# Patient Record
Sex: FEMALE | Race: WHITE
[De-identification: names, ages, dates, MRNs, and addresses within clinical notes are randomized per-mention and may not be internally consistent; named-entity substitution may affect disease eponyms.]

---

## 2023-08-18 ENCOUNTER — HOSPITAL ENCOUNTER
Dept: HOSPITAL 101 - LAB | Age: 53
Discharge: HOME | End: 2023-08-18
Payer: COMMERCIAL

## 2023-08-18 DIAGNOSIS — Z00.00: Primary | ICD-10-CM

## 2023-08-18 LAB
ADD MANUAL DIFF: NO
ALANINE AMINOTRANSFERASE: 25 U/L (ref 14–59)
ALBUMIN GLOBULIN RATIO: 0.9
ALBUMIN LEVEL: 3.7 G/DL (ref 3.4–5)
ALKALINE PHOSPHATASE: 51 U/L (ref 46–116)
ANION GAP: 13
ASPARTATE AMINO TRANSFERASE: 20 U/L (ref 15–37)
BLOOD UREA NITROGEN: 10 MG/DL (ref 7–18)
CALCIUM: 8.4 MG/DL (ref 8.5–10.1)
CARBON DIOXIDE: 25.4 MMOL/L (ref 21–32)
CHLORIDE: 103 MMOL/L (ref 98–107)
CHOLESTEROL: 263 MG/DL (ref ?–200)
CO2 BLD-SCNC: 25.4 MMOL/L (ref 21–32)
ESTIMATED GFR (AFRICAN AMERICA: >60 (ref 60–?)
ESTIMATED GFR (NON-AFRICAN AME: 53 (ref 60–?)
GLOBULIN: 3.9 G/DL
GLUCOSE BLD-MCNC: 103 MG/DL (ref 74–106)
HCT VFR BLD CALC: 38.3 % (ref 36–48)
HDL CHOLESTEROL: 68 MG/DL (ref 40–60)
HEMATOCRIT: 38.3 % (ref 36–48)
HEMOGLOBIN: 12 G/DL (ref 12–16)
IMMATURE GRANULOCYTES ABS AUTO: 0.02 10^3/UL (ref 0–0.03)
IMMATURE GRANULOCYTES PCT AUTO: 0.3 % (ref 0–0.5)
LYMPHOCYTES  ABSOLUTE AUTO: 1.8 10^3/UL (ref 1.2–3.8)
MCV RBC: 80.3 FL (ref 81–99)
MEAN CORPUSCULAR HEMOGLOBIN: 25.2 PG (ref 26.7–34)
MEAN CORPUSCULAR HGB CONC: 31.3 G/DL (ref 29.9–35.2)
MEAN CORPUSCULAR VOLUME: 80.3 FL (ref 81–99)
PLATELET # BLD: 246 10^3/UL (ref 150–450)
PLATELET COUNT: 246 10^3/UL (ref 150–450)
POTASSIUM SERPLBLD-SCNC: 4.4 MMOL/L (ref 3.5–5.1)
POTASSIUM: 4.4 MMOL/L (ref 3.5–5.1)
RED BLOOD COUNT: 4.77 10^6/UL (ref 4.2–5.4)
SODIUM BLD-SCNC: 137 MMOL/L (ref 136–145)
SODIUM: 137 MMOL/L (ref 136–145)
THYROID STIMULATING HORMONE: 1.05 UIU/ML (ref 0.36–3.74)
TOTAL PROTEIN: 7.6 G/DL (ref 6.4–8.2)
TRIGLYCERIDES: 94 MG/DL (ref ?–150)
VLDL CHOLESTEROL: 18.8 MG/DL
WBC # BLD: 6.7 10^3/UL (ref 4–11)
WHITE BLOOD COUNT: 6.7 10^3/UL (ref 4–11)

## 2023-08-18 PROCEDURE — 84443 ASSAY THYROID STIM HORMONE: CPT

## 2023-08-18 PROCEDURE — 80061 LIPID PANEL: CPT

## 2023-08-18 PROCEDURE — 36415 COLL VENOUS BLD VENIPUNCTURE: CPT

## 2023-08-18 PROCEDURE — 83036 HEMOGLOBIN GLYCOSYLATED A1C: CPT

## 2023-08-18 PROCEDURE — 85025 COMPLETE CBC W/AUTO DIFF WBC: CPT

## 2023-08-18 PROCEDURE — 80053 COMPREHEN METABOLIC PANEL: CPT

## 2023-08-23 PROBLEM — R07.9 CHEST PAIN: Status: ACTIVE | Noted: 2023-08-23

## 2023-08-23 PROBLEM — I49.3 PREMATURE VENTRICULAR CONTRACTIONS: Status: ACTIVE | Noted: 2023-08-23

## 2023-08-23 PROBLEM — Z87.891 FORMER SMOKER: Status: ACTIVE | Noted: 2023-08-23

## 2023-08-23 PROBLEM — R06.02 SHORTNESS OF BREATH ON EXERTION: Status: ACTIVE | Noted: 2023-08-23

## 2023-08-23 PROBLEM — E78.5 HYPERLIPIDEMIA: Status: ACTIVE | Noted: 2023-08-23

## 2023-08-23 RX ORDER — ALPRAZOLAM 0.25 MG/1
0.25 TABLET ORAL AS NEEDED
COMMUNITY

## 2023-08-23 RX ORDER — ACETAMINOPHEN AND CODEINE PHOSPHATE 300; 30 MG/1; MG/1
1 TABLET ORAL 3 TIMES DAILY PRN
COMMUNITY

## 2023-08-23 RX ORDER — CYCLOBENZAPRINE HCL 10 MG
10 TABLET ORAL AS NEEDED
COMMUNITY

## 2023-10-03 ENCOUNTER — OFFICE VISIT (OUTPATIENT)
Dept: CARDIOLOGY | Facility: CLINIC | Age: 53
End: 2023-10-03
Payer: COMMERCIAL

## 2023-10-03 VITALS
HEART RATE: 70 BPM | SYSTOLIC BLOOD PRESSURE: 120 MMHG | DIASTOLIC BLOOD PRESSURE: 70 MMHG | HEIGHT: 63 IN | WEIGHT: 133 LBS | BODY MASS INDEX: 23.57 KG/M2

## 2023-10-03 DIAGNOSIS — R06.02 SHORTNESS OF BREATH ON EXERTION: ICD-10-CM

## 2023-10-03 DIAGNOSIS — R07.9 CHEST PAIN, UNSPECIFIED TYPE: Primary | ICD-10-CM

## 2023-10-03 DIAGNOSIS — Z87.891 FORMER SMOKER: ICD-10-CM

## 2023-10-03 DIAGNOSIS — I49.3 PREMATURE VENTRICULAR CONTRACTIONS: ICD-10-CM

## 2023-10-03 DIAGNOSIS — E78.5 HYPERLIPIDEMIA, UNSPECIFIED HYPERLIPIDEMIA TYPE: ICD-10-CM

## 2023-10-03 PROCEDURE — 99213 OFFICE O/P EST LOW 20 MIN: CPT | Performed by: INTERNAL MEDICINE

## 2023-10-03 PROCEDURE — 3008F BODY MASS INDEX DOCD: CPT | Performed by: INTERNAL MEDICINE

## 2023-10-03 PROCEDURE — 1036F TOBACCO NON-USER: CPT | Performed by: INTERNAL MEDICINE

## 2023-10-03 ASSESSMENT — PATIENT HEALTH QUESTIONNAIRE - PHQ9
SUM OF ALL RESPONSES TO PHQ9 QUESTIONS 1 AND 2: 0
2. FEELING DOWN, DEPRESSED OR HOPELESS: NOT AT ALL
1. LITTLE INTEREST OR PLEASURE IN DOING THINGS: NOT AT ALL

## 2023-10-03 NOTE — PROGRESS NOTES
Subjective   Alison Harrison is a 53 y.o. female.    Chief Complaint:  Follow-up (9months)    HPI  Patient is here for follow-up continue management for previous evaluation for atypical chest pain, hyperlipidemia, mild shortness of breath.  Since last time I saw her she reports functional class I.  She denies any complaint of chest pain, palpitation, lightheadedness, dizziness or syncope.  She reports since her.  Has stopped all her chest pain has resolved.  She underwent previous calcium scoring which was all very low risk.  The patient described functional class I and denying any symptoms.  Patient reviewed    ASSESSMENT:      1. Atypical chest pain appears musculoskeletal or pleuritic the patient had a remote history of resection of lung bleb. Recent stress test is reassuring.  Patient report complete resolution of her symptoms with after her period: Calcium scoring with her stopped  2. reformed smoker   3. hyperlipidemia. Does not meet criteria for treatment recent lab work noted and reviewed with her  4. minor shortness breath related to prior tobacco use functional class I with excellent exercise tolerance  5. Documentation of few premature ventricular contractions, felt to be benign. In the past with no recurrence     RECOMMENDATION:      1. Reviewed the results of her  calcium scoring with her  2. I recommended continue with observant approach discussed with her risk factor modification  3. I discussed with her risk factor modification  4. I advised her to notify me with changes of cardiac status or symptoms I will see her back in 1 year       Review of Systems   All other systems reviewed and are negative.      Objective   Constitutional:       Appearance: Healthy appearance. Not in distress.   Neck:      Vascular: No carotid bruit or JVR. JVD normal.   Pulmonary:      Effort: Pulmonary effort is normal.      Breath sounds: Normal breath sounds. No wheezing. No rhonchi. No rales.   Chest:      Chest wall: Not  tender to palpatation.   Cardiovascular:      PMI at left midclavicular line. Normal rate. Regular rhythm. Normal S1. Normal S2.       Murmurs: There is no murmur.      No gallop.  No click. No rub.   Pulses:     Intact distal pulses.   Edema:     Peripheral edema absent.   Abdominal:      General: Bowel sounds are normal.      Palpations: Abdomen is soft.      Tenderness: There is no abdominal tenderness.   Musculoskeletal: Normal range of motion.         General: No tenderness. Skin:     General: Skin is warm and dry.   Neurological:      General: No focal deficit present.      Mental Status: Alert and oriented to person, place and time.         Lab Review:   not applicable    Assessment/Plan   There were no encounter diagnoses.  1. Chest pain, unspecified type        2. Premature ventricular contractions  Follow Up In Cardiology      3. Hyperlipidemia, unspecified hyperlipidemia type        4. Shortness of breath on exertion        5. Former smoker        6. Body mass index (BMI) 22.0-22.9, adult

## 2024-01-11 ENCOUNTER — HOSPITAL ENCOUNTER (EMERGENCY)
Age: 54
LOS: 1 days | Discharge: HOME | End: 2024-01-12
Payer: COMMERCIAL

## 2024-01-11 VITALS
TEMPERATURE: 98.3 F | HEART RATE: 101 BPM | RESPIRATION RATE: 18 BRPM | SYSTOLIC BLOOD PRESSURE: 145 MMHG | OXYGEN SATURATION: 100 % | DIASTOLIC BLOOD PRESSURE: 89 MMHG

## 2024-01-11 VITALS — BODY MASS INDEX: 23.7 KG/M2

## 2024-01-11 DIAGNOSIS — G44.89: Primary | ICD-10-CM

## 2024-01-11 DIAGNOSIS — Z86.16: ICD-10-CM

## 2024-01-11 LAB
ADD MANUAL DIFF: NO
ALANINE AMINOTRANSFERASE: 26 U/L (ref 14–59)
ALBUMIN GLOBULIN RATIO: 0.9
ALBUMIN LEVEL: 3.7 G/DL (ref 3.4–5)
ALKALINE PHOSPHATASE: 59 U/L (ref 46–116)
ANION GAP: 13.5
ASPARTATE AMINO TRANSFERASE: 17 U/L (ref 15–37)
BLOOD UREA NITROGEN: 12 MG/DL (ref 7–18)
CALCIUM: 8.8 MG/DL (ref 8.5–10.1)
CARBON DIOXIDE: 26.2 MMOL/L (ref 21–32)
CHLORIDE: 103 MMOL/L (ref 98–107)
CO2 BLD-SCNC: 26.2 MMOL/L (ref 21–32)
ESTIMATED GFR (AFRICAN AMERICA: >60 (ref 60–?)
ESTIMATED GFR (NON-AFRICAN AME: 52 (ref 60–?)
GLOBULIN: 3.9 G/DL
GLUCOSE BLD-MCNC: 110 MG/DL (ref 74–106)
HCT VFR BLD CALC: 38 % (ref 36–48)
HEMATOCRIT: 38 % (ref 36–48)
HEMOGLOBIN: 12 G/DL (ref 12–16)
IMMATURE GRANULOCYTES ABS AUTO: 0.02 10^3/UL (ref 0–0.03)
IMMATURE GRANULOCYTES PCT AUTO: 0.2 % (ref 0–0.5)
LYMPHOCYTES  ABSOLUTE AUTO: 3.3 10^3/UL (ref 1.2–3.8)
MCV RBC: 85.6 FL (ref 81–99)
MEAN CORPUSCULAR HEMOGLOBIN: 27 PG (ref 26.7–34)
MEAN CORPUSCULAR HGB CONC: 31.6 G/DL (ref 29.9–35.2)
MEAN CORPUSCULAR VOLUME: 85.6 FL (ref 81–99)
PLATELET # BLD: 268 10^3/UL (ref 150–450)
PLATELET COUNT: 268 10^3/UL (ref 150–450)
POTASSIUM SERPLBLD-SCNC: 3.7 MMOL/L (ref 3.5–5.1)
POTASSIUM: 3.7 MMOL/L (ref 3.5–5.1)
RED BLOOD COUNT: 4.44 10^6/UL (ref 4.2–5.4)
SODIUM BLD-SCNC: 139 MMOL/L (ref 136–145)
SODIUM: 139 MMOL/L (ref 136–145)
TOTAL PROTEIN: 7.6 G/DL (ref 6.4–8.2)
WBC # BLD: 8.6 10^3/UL (ref 4–11)
WHITE BLOOD COUNT: 8.6 10^3/UL (ref 4–11)

## 2024-01-11 PROCEDURE — 36415 COLL VENOUS BLD VENIPUNCTURE: CPT

## 2024-01-11 PROCEDURE — 96374 THER/PROPH/DIAG INJ IV PUSH: CPT

## 2024-01-11 PROCEDURE — 96375 TX/PRO/DX INJ NEW DRUG ADDON: CPT

## 2024-01-11 PROCEDURE — 99285 EMERGENCY DEPT VISIT HI MDM: CPT

## 2024-01-11 PROCEDURE — 85025 COMPLETE CBC W/AUTO DIFF WBC: CPT

## 2024-01-11 PROCEDURE — 80053 COMPREHEN METABOLIC PANEL: CPT

## 2024-01-11 PROCEDURE — 85652 RBC SED RATE AUTOMATED: CPT

## 2024-01-11 PROCEDURE — 86140 C-REACTIVE PROTEIN: CPT

## 2024-01-11 PROCEDURE — 70470 CT HEAD/BRAIN W/O & W/DYE: CPT

## 2024-01-11 NOTE — ED.GENADUL1
HPI - General Adult
General
Chief complaint: Headache
Stated complaint: HEADACHE
Time Seen by Provider: 24 22:07
Source: patient
Mode of arrival: walk-in
Limitations: no limitations
History of Present Illness
HPI narrative: 
53-year-old female presents for evaluation of intermittent headaches since before , around 3-4 weeks. The patient states the headaches come and go and are in different places on her head when they come and go. She states they sometimes feel 
better when she squeezes her scalp. They're associated with photophobia and nausea. She has had a prescription for Zofran which has not helped her nausea. She has not had any slurred speech confusion blurred vision, fevers neck pain or stiffness. 
She did have COVID 19 in September. She has had several COVID 19 test since she has been developing headaches without any being positive. She did get a new perception for glasses recently and thought that her headaches may be related to the new 
glasses so she started wearing her prior glasses without any improvement. She has been using ibuprofen and Tylenol. She was seen by her family physician and given medications for migraine headaches. She is frustrated because she does not have a 
history of migraine headaches and the headaches are debilitating at times. She does not have any sore throats, runny nose or sinus congestion. 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 
her headaches.
Related Data
Allergies

Allergy/AdvReac Type Severity Reaction Status Date / Time
No Known Drug Allergies Allergy   Verified 24 22:18



Review of Systems
ROS  
 Status of ROS 10 or more systems reviewed and unremarkable except as noted in history and below   

[facility]
Social History
Smoking status:  Never smoker 



Exam
Narrative
Exam Narrative: 
Nurses note and vital signs reviewed and patient is not hypoxic. She was mildly tachycardic with a pulse of 101 and blood pressure is mildly elevated at 145/89.

General: Alert, nontoxic  female, GCS 15, patient appears uncomfortable but otherwise well, no respiratory distress.  Patient is resting comfortably on cart.
Skin:  Warm, dry, no pallor noted.  There is no rash noted.
Head:  Normocephalic, atraumatic
Eye: Normal conjunctiva, no drainage, EOMI. PERRL, no photophobia
Ears, Nose, Mouth, and Throat: oral mucosa is moist. Nares patent.
neck: supple, no meningeal signs
Cardiovascular:  Regular Rate and Rhythm S1S2, no murmurs, rubs or gallops
Respiratory:  Patient is in no distress, no accessory muscle use, lungs are clear to auscultation, no wheezing, rales or rhonchi
Back:  non-tender, no CVA tenderness bilaterally to percussion.
GI:  Normal bowel sounds, no tenderness to palpation, no masses appreciated.  No rebound, guarding, or rigidity noted.
Musculoskeletal: The patient has no evidence of calf tenderness, no pitting edema, symmetrical pulses noted bilaterally
Neurological:  A&O x4, normal speech, ambulatory with a steady gait, no notable confusion, stroke scale is 0
Psychiatric:  Cooperative
Constitutional
Vital Signs, click to edit/add: 

Last Vital Signs

Temp  98.3 F   24 22:15
Pulse  101 H  24 22:15
Resp  18   24 22:15
BP  145/89 H  24 22:15
Pulse Ox  100   24 22:15
O2 Del Method  Room Air  24 22:15




Course
Vital Signs
Vital signs: 

Vital Signs

Temperature  98.3 F   24 22:15
Pulse Rate  101 H  24 22:15
Respiratory Rate  18   24 22:15
Blood Pressure  145/89 H  24 22:15
Pulse Oximetry  100   24 22:15
Oxygen Delivery Method  Room Air  24 22:15



Temperature  98.3 F   24 22:15
Pulse Rate  101 H  24 22:15
Respiratory Rate  18   24 22:15
Blood Pressure  145/89 H  24 22:15
Pulse Oximetry  100   24 22:15
Oxygen Delivery Method  Room Air  24 22:15




Medical Decision Making
Medical Records
Medical records narrative: 
53-year-old female presents for evaluation of 3 weeks of intermittent moderate to severe headaches. She does not have a history of chronic headaches. The headaches are associated with nausea and photophobia. At the time of her evaluation her 
headache is posterior occipital in natureThey're debilitating at times and she describes them as moving around in different areas of her head. She was recently seen by her family physician who suspects that she may be having migraine headaches 
however she does not have a history of migraine headaches or any particular headache and drums. She does not have a fever. She has no nuchal rigidity. Her neuro exam is normal. Her vital signs were normal with the exception of a pulse of 101. An IV 
was placed and she is medicated with IV Zofran and Toradol with minimal clinical improvement. She was then medicated with Fioricet. Routine labs are reviewed. She has an elevated ESR, normal WBC and hemoglobin. CRP and CMP are normal. CT scan of the 
head with and without contrast was ordered and is included in the body of this report. It is negative for acute findings. 
The results of the labs and CT scan were discussed with the patient and her . She states that she is not surprised that her sedimentation rate is elevated because she does have episodes where her lymph nodes swell up for no apparent reason. 
She declines a course of steroids. Clinically she does not have temporal arteritis as there is no tenderness over her temporal artery and her headaches are in different places at different times in her head. Clinically to me at sounds like she is 
having vascular headaches. She feels comfortable being discharged home at this time will be discharged home with prescription for Fioricet and Compazine. She was given migraine medications by her family physician. She was encouraged to return to 
emergency department for ongoing or worsening headaches, fevers, any neurologic symptoms related to the headaches or any concerns.



The [address] [address]

CT Scan Report 
Signed
Patient: LUZ MARIA SELF


MR#: [id number]
: 1970


Acct:[id number]
Age/Sex: 53 / F


ADM Date: 24
Loc: ER  


Attending Dr: 


Ordering Physician: Monet Santos
Date of Service: 24
Procedure(s): CT head/brain wo/w con
Accession Number(s): [id number]

cc: Leah Yuan M.D.~


 
     The [address] [address] [phone]
 
 
Patient Name:
LUZ MARIA SELF
 
MRN: [id number]    YOB: 1970    Sex: F
Assigned Patient Location: ER
Current Patient Location: ER
Accession/Order Number: [id number]
Exam Date: 2024  22:55    Report Date: 2024  23:27
 
At the request of:
MONET  EDVIN  
 
Procedure:  CT head/brain wo/w con
 
EXAM: CT head/brain wo/w con 
 
HISTORY: headache
 
COMPARISON: None. 
 
TECHNIQUE: Axial CT scans through the head were obtained after the 
administration of intravenous contrast. Dose reduction techniques were 
achieved 
by using: automated exposure control and/or adjustment of mA and /or kV 
according to patient size and/or use of iterative reconstruction technique.
 
 
 
FINDINGS: 
 
There is no acute intracranial hemorrhage or abnormal extra-axial fluid 
collection. No mass effect or midline shift is seen. 
 
There is no evidence of large acute territorial infarction. There is no 
hydrocephalus. 
 
To the limit of CT, the posterior fossa appears unremarkable. 
 
There are no abnormal parenchymal or leptomeningeal enhancement.
 
The calvaria and extra cranial soft tissues are unremarkable. The visualized 
orbits show no abnormality. 
 
The visualized paranasal sinuses show no air-fluid level. Mastoid air cells 
are 
clear.
 
 
 

ORDER #: [id number] CT/CT head/brain wo/w con
IMPRESSION: 
 
No acute intracranial abnormality. 
 
No abnormal intracranial enhancement.
 
 
 
 
Electronically authenticated by: ROSA GARNER   Date: 2024  23:27
Lab Data
Lab results reviewed: Yes I reviewed the patient's lab results
Labs: 

Lab Results

  24 Range/Units
  22:18 22:31 
WBC   8.6  (4.0-11.0)  10^3/uL
RBC   4.44  (4.20-5.40)  10^6/uL
Hgb   12.0  (12.0-16.0)  g/dL
Hct   38.0  (36.0-48.0)  %
MCV   85.6  (81.0-99.0)  fL
MCH   27.0  (26.7-34.0)  pg
MCHC   31.6  (29.9-35.2)  g/dL
RDW   14.6  (11.0-15.0)  %
Plt Count   268  (150-450)  10^3/uL
MPV   10.4  (9.5-13.5)  fL
Neut % (Auto)   47.0  (43.0-75.0)  %
Lymph % (Auto)   38.7  (20.5-60.0)  %
Mono % (Auto)   11.3  (1.7-12.0)  %
Eos % (Auto)   2.3  (0.9-7.0)  %
Baso % (Auto)   0.5  (0.2-2.0)  %
Neut # (Auto)   4.1  (1.4-6.5)  10^3/uL
Lymph # (Auto)   3.3  (1.2-3.8)  10^3/uL
Mono # (Auto)   1.0 H  (0.3-0.8)  10^3/uL
Eos # (Auto)   0.2  (0.0-0.7)  10^3/uL
Baso # (Auto)   0.0  (0.0-0.1)  10^3/uL
Abs Immat Gran (auto)   0.02  (0.00-0.03)  10^3/uL
Imm/Tot Granulo (auto)   0.2  (0.0-0.5)  %
ESR  59 H   (<=30)  mm/hr
Sodium  139   (136-145)  mmol/L
Potassium  3.7   (3.5-5.1)  mmol/L
Chloride  103   ()  mmol/L
Carbon Dioxide  26.2   (21.0-32.0)  mmol/L
Anion Gap  13.5   
BUN  12.0   (7.0-18.0)  mg/dL
Creatinine  1.10 H   (0.55-1.02)  mg/dL
Est GFR ( Amer)  >60   (>=60)  
Est GFR (Non-Af Amer)  52 L   (>=60)  
BUN/Creatinine Ratio  10.9   
Glucose  110 H   ()  mg/dL
Calcium  8.8   (8.5-10.1)  mg/dL
Total Bilirubin  0.3   (0.2-1.0)  mg/dL
AST  17   (15-37)  U/L
ALT  26   (14-59)  U/L
Alkaline Phosphatase  59   ()  U/L
C-Reactive Protein  <0.50   (<=0.50)  mg/dL
Total Protein  7.6   (6.4-8.2)  g/dL
Albumin  3.7   (3.4-5.0)  g/dL
Globulin  3.9   g/dL
Albumin/Globulin Ratio  0.9   




Discharge Plan
Discharge
Chief Complaint: Headache

Clinical Impression:
 Headache syndrome


Patient Disposition: Home, Self-Care

Time of Disposition Decision: 23:53

Condition: Good

Instructions:  Migraine Headache (ED), Acute Headache (ED), General Headache (ED)

Stand Alone Forms:  Portal Instructions

Referrals:
Leah Yuan MD [Primary Care Provider] - 1 week

## 2024-01-11 NOTE — CT_ITS
The [address] [address] 
     [phone] 
  
  
Patient Name: 
LUZ MARIA SELF 
  
MRN: [id number]    YOB: 1970    Sex: F 
Assigned Patient Location: ER 
Current Patient Location: ER 
Accession/Order Number: [id number] 
Exam Date: 1/11/2024  22:55    Report Date: 1/11/2024  23:27 
  
At the request of: 
WHIT  MARKER   
  
Procedure:  CT head/brain wo/w con 
  
EXAM: CT head/brain wo/w con  
  
HISTORY: headache 
  
COMPARISON: None.  
  
TECHNIQUE: Axial CT scans through the head were obtained after the  
administration of intravenous contrast. Dose reduction techniques were  
achieved  
by using: automated exposure control and/or adjustment of mA and /or kV  
according to patient size and/or use of iterative reconstruction technique. 
  
  
  
FINDINGS:  
  
There is no acute intracranial hemorrhage or abnormal extra-axial fluid  
collection. No mass effect or midline shift is seen.  
  
There is no evidence of large acute territorial infarction. There is no  
hydrocephalus.  
  
To the limit of CT, the posterior fossa appears unremarkable.  
  
There are no abnormal parenchymal or leptomeningeal enhancement. 
  
The calvaria and extra cranial soft tissues are unremarkable. The visualized  
orbits show no abnormality.  
  
The visualized paranasal sinuses show no air-fluid level. Mastoid air cells  
are  
clear. 
  
  
  
ORDER #: [id number] CT/CT head/brain wo/w con  
IMPRESSION:   
   
No acute intracranial abnormality.   
   
No abnormal intracranial enhancement.  
   
   
   
   
Electronically authenticated by: ROSA GARNER   Date: 1/11/2024  23:27

## 2024-01-11 NOTE — ED_ITS
HPI - General Adult    
General    
Chief complaint: Headache    
Stated complaint: HEADACHE    
Time Seen by Provider: 24 22:07    
Source: patient    
Mode of arrival: walk-in    
Limitations: no limitations    
History of Present Illness    
HPI narrative:     
53-year-old female presents for evaluation of intermittent headaches since   
before , around 3-4 weeks. The patient states the headaches come and go  
and are in different places on her head when they come and go. She states they   
sometimes feel better when she squeezes her scalp. They're associated with   
photophobia and nausea. She has had a prescription for Zofran which has not   
helped her nausea. She has not had any slurred speech confusion blurred vision,   
fevers neck pain or stiffness. She did have COVID 19 in September. She has had   
several COVID 19 test since she has been developing headaches without any being   
positive. She did get a new perception for glasses recently and thought that her  
headaches may be related to the new glasses so she started wearing her prior gla  
sses without any improvement. She has been using ibuprofen and Tylenol. She was   
seen by her family physician and given medications for migraine headaches. She   
is frustrated because she does not have a history of migraine headaches and the   
headaches are debilitating at times. She does not have any sore throats, runny   
nose or sinus congestion. 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   
uncomfortable at work and generally not herself, due to her headaches.    
Related Data    
                                    Allergies    
    
    
    
Allergy/AdvReac Type Severity Reaction Status Date / Time    
     
No Known Drug Allergies Allergy   Verified 24 22:18    
    
    
    
    
Review of Systems    
    
    
ROS      
    
 Status of ROS                          10 or more systems reviewed and unremark    
able except as noted in     
history and below       
    
    
[facility]    
Social History    
Smoking status:  Never smoker     
    
    
    
Exam    
Narrative    
Exam Narrative:     
Nurses note and vital signs reviewed and patient is not hypoxic. She was mildly   
tachycardic with a pulse of 101 and blood pressure is mildly elevated at 145/89.    
    
General: Alert, nontoxic  female, GCS 15, patient appears uncomfortable  
but otherwise well, no respiratory distress.  Patient is resting comfortably on   
cart.    
Skin:  Warm, dry, no pallor noted.  There is no rash noted.    
Head:  Normocephalic, atraumatic    
Eye: Normal conjunctiva, no drainage, EOMI. PERRL, no photophobia    
Ears, Nose, Mouth, and Throat: oral mucosa is moist. Nares patent.    
neck: supple, no meningeal signs    
Cardiovascular:  Regular Rate and Rhythm S1S2, no murmurs, rubs or gallops    
Respiratory:  Patient is in no distress, no accessory muscle use, lungs are   
clear to auscultation, no wheezing, rales or rhonchi    
Back:  non-tender, no CVA tenderness bilaterally to percussion.    
GI:  Normal bowel sounds, no tenderness to palpation, no masses appreciated.  No  
rebound, guarding, or rigidity noted.    
Musculoskeletal: The patient has no evidence of calf tenderness, no pitting   
edema, symmetrical pulses noted bilaterally    
Neurological:  A&O x4, normal speech, ambulatory with a steady gait, no notable   
confusion, stroke scale is 0    
Psychiatric:  Cooperative    
Constitutional    
Vital Signs, click to edit/add:     
    
                                Last Vital Signs    
    
    
    
Temp  98.3 F   24 22:15    
     
Pulse  101 H  24 22:15    
     
Resp  18   24 22:15    
     
BP  145/89 H  24 22:15    
     
Pulse Ox  100   24 22:15    
     
O2 Del Method  Room Air  24 22:15    
    
    
    
    
    
Course    
Vital Signs    
Vital signs:     
    
                                   Vital Signs    
    
    
    
Temperature  98.3 F   24 22:15    
     
Pulse Rate  101 H  24 22:15    
     
Respiratory Rate  18   24 22:15    
     
Blood Pressure  145/89 H  24 22:15    
     
Pulse Oximetry  100   24 22:15    
     
Oxygen Delivery Method  Room Air  24 22:15    
    
    
                                            
    
    
    
Temperature  98.3 F   24 22:15    
     
Pulse Rate  101 H  24 22:15    
     
Respiratory Rate  18   24 22:15    
     
Blood Pressure  145/89 H  24 22:15    
     
Pulse Oximetry  100   24 22:15    
     
Oxygen Delivery Method  Room Air  24 22:15    
    
    
    
    
    
Medical Decision Making    
Medical Records    
Medical records narrative:     
53-year-old female presents for evaluation of 3 weeks of intermittent moderate   
to severe headaches. She does not have a history of chronic headaches. The   
headaches are associated with nausea and photophobia. At the time of her   
evaluation her headache is posterior occipital in natureThey're debilitating at   
times and she describes them as moving around in different areas of her head.   
She was recently seen by her family physician who suspects that she may be   
having migraine headaches however she does not have a history of migraine   
headaches or any particular headache and drums. She does not have a fever. She   
has no nuchal rigidity. Her neuro exam is normal. Her vital signs were normal   
with the exception of a pulse of 101. An IV was placed and she is medicated with  
IV Zofran and Toradol with minimal clinical improvement. She was then medicated   
with Fioricet. Routine labs are reviewed. She has an elevated ESR, normal WBC   
and hemoglobin. CRP and CMP are normal. CT scan of the head with and without   
contrast was ordered and is included in the body of this report. It is negative   
for acute findings.     
The results of the labs and CT scan were discussed with the patient and her   
. She states that she is not surprised that her sedimentation rate is   
elevated because she does have episodes where her lymph nodes swell up for no   
apparent reason. She declines a course of steroids. Clinically she does not have  
temporal arteritis as there is no tenderness over her temporal artery and her   
headaches are in different places at different times in her head. Clinically to   
me at sounds like she is having vascular headaches. She feels comfortable being   
discharged home at this time will be discharged home with prescription for   
Fioricet and Compazine. She was given migraine medications by her family   
physician. She was encouraged to return to emergency department for ongoing or   
worsening headaches, fevers, any neurologic symptoms related to the headaches or  
any concerns.    
    
    
    
                              [place] [address]    
                                            
                                 CT Scan Report     
                                     Signed    
Patient: LUZ MARIA SELF    
    
    
MR#: [id number]    
: 1970    
    
    
Acct:[id number]    
Age/Sex: 53 / F    
    
    
ADM Date: 24    
Loc: ER      
    
    
Attending Dr:     
    
    
Ordering Physician: Monet Santos    
Date of Service: 24    
Procedure(s): CT head/brain wo/w con    
Accession Number(s): [id number]    
    
cc: Leah Yuan M.D.~    
    
    
     
     The [address] [address] [phone]    
     
     
Patient Name:    
LUZ MARIA SELF    
     
MRN: [id number]    YOB: 1970    Sex: F    
Assigned Patient Location: ER    
Current Patient Location: ER    
Accession/Order Number: [id number]    
Exam Date: 2024  22:55    Report Date: 2024  23:27    
     
At the request of:    
MONET SANTOS      
     
Procedure:  CT head/brain wo/w con    
     
EXAM: CT head/brain wo/w con     
     
HISTORY: headache    
     
COMPARISON: None.     
     
TECHNIQUE: Axial CT scans through the head were obtained after the     
administration of intravenous contrast. Dose reduction techniques were     
achieved     
by using: automated exposure control and/or adjustment of mA and /or kV     
according to patient size and/or use of iterative reconstruction technique.    
     
     
     
FINDINGS:     
     
There is no acute intracranial hemorrhage or abnormal extra-axial fluid     
collection. No mass effect or midline shift is seen.     
     
There is no evidence of large acute territorial infarction. There is no     
hydrocephalus.     
     
To the limit of CT, the posterior fossa appears unremarkable.     
     
There are no abnormal parenchymal or leptomeningeal enhancement.    
     
The calvaria and extra cranial soft tissues are unremarkable. The visualized     
orbits show no abnormality.     
     
The visualized paranasal sinuses show no air-fluid level. Mastoid air cells     
are     
clear.    
     
     
     
    
ORDER #: [id number] CT/CT head/brain wo/w con    
IMPRESSION:     
     
No acute intracranial abnormality.     
     
No abnormal intracranial enhancement.    
     
     
     
     
Electronically authenticated by: ROSA GARNER   Date: 2024  23:27    
Lab Data    
Lab results reviewed: Yes I reviewed the patient's lab results    
Labs:     
    
                                   Lab Results    
    
    
    
  24 Range/Units    
    
  22:18 22:31     
     
WBC   8.6  (4.0-11.0)  10^3/uL    
     
RBC   4.44  (4.20-5.40)  10^6/uL    
     
Hgb   12.0  (12.0-16.0)  g/dL    
     
Hct   38.0  (36.0-48.0)  %    
     
MCV   85.6  (81.0-99.0)  fL    
     
MCH   27.0  (26.7-34.0)  pg    
     
MCHC   31.6  (29.9-35.2)  g/dL    
     
RDW   14.6  (11.0-15.0)  %    
     
Plt Count   268  (150-450)  10^3/uL    
     
MPV   10.4  (9.5-13.5)  fL    
     
Neut % (Auto)   47.0  (43.0-75.0)  %    
     
Lymph % (Auto)   38.7  (20.5-60.0)  %    
     
Mono % (Auto)   11.3  (1.7-12.0)  %    
     
Eos % (Auto)   2.3  (0.9-7.0)  %    
     
Baso % (Auto)   0.5  (0.2-2.0)  %    
     
Neut # (Auto)   4.1  (1.4-6.5)  10^3/uL    
     
Lymph # (Auto)   3.3  (1.2-3.8)  10^3/uL    
     
Mono # (Auto)   1.0 H  (0.3-0.8)  10^3/uL    
     
Eos # (Auto)   0.2  (0.0-0.7)  10^3/uL    
     
Baso # (Auto)   0.0  (0.0-0.1)  10^3/uL    
     
Abs Immat Gran (auto)   0.02  (0.00-0.03)  10^3/uL    
     
Imm/Tot Granulo (auto)   0.2  (0.0-0.5)  %    
     
ESR  59 H   (<=30)  mm/hr    
     
Sodium  139   (136-145)  mmol/L    
     
Potassium  3.7   (3.5-5.1)  mmol/L    
     
Chloride  103   ()  mmol/L    
     
Carbon Dioxide  26.2   (21.0-32.0)  mmol/L    
     
Anion Gap  13.5       
     
BUN  12.0   (7.0-18.0)  mg/dL    
     
Creatinine  1.10 H   (0.55-1.02)  mg/dL    
     
Est GFR ( Amer)  >60   (>=60)      
     
Est GFR (Non-Af Amer)  52 L   (>=60)      
     
BUN/Creatinine Ratio  10.9       
     
Glucose  110 H   ()  mg/dL    
     
Calcium  8.8   (8.5-10.1)  mg/dL    
     
Total Bilirubin  0.3   (0.2-1.0)  mg/dL    
     
AST  17   (15-37)  U/L    
     
ALT  26   (14-59)  U/L    
     
Alkaline Phosphatase  59   ()  U/L    
     
C-Reactive Protein  <0.50   (<=0.50)  mg/dL    
     
Total Protein  7.6   (6.4-8.2)  g/dL    
     
Albumin  3.7   (3.4-5.0)  g/dL    
     
Globulin  3.9   g/dL    
     
Albumin/Globulin Ratio  0.9       
    
    
    
    
    
Discharge Plan    
Discharge    
Chief Complaint: Headache    
    
Clinical Impression:    
 Headache syndrome    
    
    
Patient Disposition: Home, Self-Care    
    
Time of Disposition Decision: 23:53    
    
Condition: Good    
    
Instructions:  Migraine Headache (ED), Acute Headache (ED), General Headache   
(ED)    
    
Stand Alone Forms:  Portal Instructions    
    
Referrals:    
Leah Yuan MD [Primary Care Provider] - 1 week

## 2024-01-11 NOTE — PC.NURSE
Pt with headaches that have been occurring since before [place] time. The headaches are not consistent in location, timing, duration, quality or severity. She has known neck problems and started by seeing her chiropractor for them, her 
chiropractor thought she should see her PCP because he did not think the headaches were from the neck. Here PCP was not concerned about the headaches and only gave her a sample of a migraine medication. The headaches have continued to get worse, 
patient has tried to take several OTC and prescription medications including muscle relaxers, Anti inflammatories, Benadryl, Zofran. There has been improvement enough that she is able to get sleep, but not full resolution. Patient is hoping to have 
imaging done today.

## 2024-10-03 ENCOUNTER — APPOINTMENT (OUTPATIENT)
Dept: CARDIOLOGY | Facility: CLINIC | Age: 54
End: 2024-10-03
Payer: COMMERCIAL

## 2024-10-09 ENCOUNTER — APPOINTMENT (OUTPATIENT)
Dept: CARDIOLOGY | Facility: CLINIC | Age: 54
End: 2024-10-09
Payer: COMMERCIAL

## 2024-10-09 VITALS
WEIGHT: 127.6 LBS | SYSTOLIC BLOOD PRESSURE: 131 MMHG | HEIGHT: 63 IN | BODY MASS INDEX: 22.61 KG/M2 | HEART RATE: 84 BPM | DIASTOLIC BLOOD PRESSURE: 82 MMHG

## 2024-10-09 DIAGNOSIS — R07.9 CHEST PAIN, UNSPECIFIED TYPE: Primary | ICD-10-CM

## 2024-10-09 DIAGNOSIS — R06.02 SHORTNESS OF BREATH ON EXERTION: ICD-10-CM

## 2024-10-09 DIAGNOSIS — Z87.891 FORMER SMOKER: ICD-10-CM

## 2024-10-09 DIAGNOSIS — I49.3 PREMATURE VENTRICULAR CONTRACTIONS: ICD-10-CM

## 2024-10-09 DIAGNOSIS — E78.5 HYPERLIPIDEMIA, UNSPECIFIED HYPERLIPIDEMIA TYPE: ICD-10-CM

## 2024-10-09 PROCEDURE — 99213 OFFICE O/P EST LOW 20 MIN: CPT | Performed by: INTERNAL MEDICINE

## 2024-10-09 PROCEDURE — 1036F TOBACCO NON-USER: CPT | Performed by: INTERNAL MEDICINE

## 2024-10-09 PROCEDURE — 3008F BODY MASS INDEX DOCD: CPT | Performed by: INTERNAL MEDICINE

## 2024-10-09 RX ORDER — OMEPRAZOLE 20 MG/1
20 CAPSULE, DELAYED RELEASE ORAL
COMMUNITY

## 2024-10-09 RX ORDER — FAMOTIDINE 20 MG/1
20 TABLET, FILM COATED ORAL NIGHTLY
COMMUNITY

## 2024-10-09 NOTE — LETTER
October 9, 2024     Aleah Bassett MD  [address]  [address]  [place] [facility] [address]    Patient: Alison Harrison   YOB: 1970   Date of Visit: 10/9/2024       Dear Dr. Aleah Bassett MD:    Thank you for referring Alison Harrison to me for evaluation. Below are my notes for this consultation.  If you have questions, please do not hesitate to call me. I look forward to following your patient along with you.       Sincerely,     Shai Saenz MD      CC: No Recipients  ______________________________________________________________________________________    Subjective   Alison Harrison is a 54 y.o. female       Chief Complaint    Annual Exam          HPI     Patient is here for follow-up continue management for previous evaluation for atypical chest pain and hyperlipidemia.  Since last time I saw her she admit to very high level of anxiety.  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  The patient denies complaint of chest pain recently denies lightheadedness, dizziness or syncope.  She remains active.    ASSESSMENT:      1. Atypical chest pain appears musculoskeletal or pleuritic the patient had a remote history of resection of lung bleb. Recent stress test is reassuring.  Patient report complete resolution of her symptoms with after her period: Calcium scoring with her stopped  2. reformed smoker   3. hyperlipidemia. Does not meet criteria for treatment recent lab work noted and reviewed with her  4. minor shortness breath related to prior tobacco use functional class I with excellent exercise tolerance  5. Documentation of few premature ventricular contractions, felt to be benign. In the past with no recurrence     RECOMMENDATION:      1. Reviewed the results of her previous cardiac workup including stress test and calcium scoring  2. I recommended continue with observant approach discussed with her risk factor modification  3. I discussed with her risk  "factor modification  4. I advised her to notify me with changes of cardiac status or symptoms I will see her back in 1 year  Review of Systems   All other systems reviewed and are negative.           Vitals:    10/09/24 1522 10/09/24 1552   BP: 146/86 131/82   BP Location: Left arm    Patient Position: Sitting    Pulse: 84    Weight: 57.9 kg (127 lb 9.6 oz)    Height: 1.6 m (5' 3\")         Objective   Physical Exam  Constitutional:       Appearance: Normal appearance.   HENT:      Nose: Nose normal.   Neck:      Vascular: No carotid bruit.   Cardiovascular:      Rate and Rhythm: Normal rate.      Pulses: Normal pulses.      Heart sounds: Normal heart sounds.   Pulmonary:      Effort: Pulmonary effort is normal.   Abdominal:      General: Bowel sounds are normal.      Palpations: Abdomen is soft.   Musculoskeletal:         General: Normal range of motion.      Cervical back: Normal range of motion.      Right lower leg: No edema.      Left lower leg: No edema.   Skin:     General: Skin is warm and dry.   Neurological:      General: No focal deficit present.      Mental Status: She is alert.   Psychiatric:         Mood and Affect: Mood normal.         Behavior: Behavior normal.         Thought Content: Thought content normal.         Judgment: Judgment normal.         Allergies  Patient has no known allergies.     Current Medications    Current Outpatient Medications:   •  acetaminophen-codeine (Tylenol w/ Codeine #3) 300-30 mg tablet, Take 1 tablet by mouth 3 times a day as needed for severe pain (7 - 10)., Disp: , Rfl:   •  ALPRAZolam (Xanax) 0.25 mg tablet, Take 1 tablet (0.25 mg) by mouth if needed for anxiety., Disp: , Rfl:   •  cyclobenzaprine (Flexeril) 10 mg tablet, Take 1 tablet (10 mg) by mouth if needed., Disp: , Rfl:   •  famotidine (Pepcid) 20 mg tablet, Take 1 tablet (20 mg) by mouth once daily at bedtime., Disp: , Rfl:   •  omeprazole (PriLOSEC) 20 mg DR capsule, Take 1 capsule (20 mg) by mouth once " daily in the morning. Take before meals. Do not crush or chew., Disp: , Rfl:                      Assessment/Plan   1. Chest pain, unspecified type        2. Premature ventricular contractions  Follow Up In Cardiology    Follow Up In Cardiology      3. Shortness of breath on exertion        4. Hyperlipidemia, unspecified hyperlipidemia type        5. Former smoker        6. Body mass index (BMI) 22.0-22.9, adult                 Scribe Attestation  By signing my name below, Jing CASTILLO LPN, Scribe   attest that this documentation has been prepared under the direction and in the presence of Shai Saenz MD.     Provider Attestation - Scribe documentation    All medical record entries made by the Scribe were at my direction and personally dictated by me. I have reviewed the chart and agree that the record accurately reflects my personal performance of the history, physical exam, discussion and plan.

## 2024-10-09 NOTE — PROGRESS NOTES
"Subjective   Alison Harrison is a 54 y.o. female       Chief Complaint    Annual Exam          HPI     Patient is here for follow-up continue management for previous evaluation for atypical chest pain and hyperlipidemia.  Since last time I saw her she admit to very high level of anxiety.  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  The patient denies complaint of chest pain recently denies lightheadedness, dizziness or syncope.  She remains active.    ASSESSMENT:      1. Atypical chest pain appears musculoskeletal or pleuritic the patient had a remote history of resection of lung bleb. Recent stress test is reassuring.  Patient report complete resolution of her symptoms with after her period: Calcium scoring with her stopped  2. reformed smoker   3. hyperlipidemia. Does not meet criteria for treatment recent lab work noted and reviewed with her  4. minor shortness breath related to prior tobacco use functional class I with excellent exercise tolerance  5. Documentation of few premature ventricular contractions, felt to be benign. In the past with no recurrence     RECOMMENDATION:      1. Reviewed the results of her previous cardiac workup including stress test and calcium scoring  2. I recommended continue with observant approach discussed with her risk factor modification  3. I discussed with her risk factor modification  4. I advised her to notify me with changes of cardiac status or symptoms I will see her back in 1 year  Review of Systems   All other systems reviewed and are negative.           Vitals:    10/09/24 1522 10/09/24 1552   BP: 146/86 131/82   BP Location: Left arm    Patient Position: Sitting    Pulse: 84    Weight: 57.9 kg (127 lb 9.6 oz)    Height: 1.6 m (5' 3\")         Objective   Physical Exam  Constitutional:       Appearance: Normal appearance.   HENT:      Nose: Nose normal.   Neck:      Vascular: No carotid bruit.   Cardiovascular:      Rate and " Rhythm: Normal rate.      Pulses: Normal pulses.      Heart sounds: Normal heart sounds.   Pulmonary:      Effort: Pulmonary effort is normal.   Abdominal:      General: Bowel sounds are normal.      Palpations: Abdomen is soft.   Musculoskeletal:         General: Normal range of motion.      Cervical back: Normal range of motion.      Right lower leg: No edema.      Left lower leg: No edema.   Skin:     General: Skin is warm and dry.   Neurological:      General: No focal deficit present.      Mental Status: She is alert.   Psychiatric:         Mood and Affect: Mood normal.         Behavior: Behavior normal.         Thought Content: Thought content normal.         Judgment: Judgment normal.         Allergies  Patient has no known allergies.     Current Medications    Current Outpatient Medications:     acetaminophen-codeine (Tylenol w/ Codeine #3) 300-30 mg tablet, Take 1 tablet by mouth 3 times a day as needed for severe pain (7 - 10)., Disp: , Rfl:     ALPRAZolam (Xanax) 0.25 mg tablet, Take 1 tablet (0.25 mg) by mouth if needed for anxiety., Disp: , Rfl:     cyclobenzaprine (Flexeril) 10 mg tablet, Take 1 tablet (10 mg) by mouth if needed., Disp: , Rfl:     famotidine (Pepcid) 20 mg tablet, Take 1 tablet (20 mg) by mouth once daily at bedtime., Disp: , Rfl:     omeprazole (PriLOSEC) 20 mg DR capsule, Take 1 capsule (20 mg) by mouth once daily in the morning. Take before meals. Do not crush or chew., Disp: , Rfl:                      Assessment/Plan   1. Chest pain, unspecified type        2. Premature ventricular contractions  Follow Up In Cardiology    Follow Up In Cardiology      3. Shortness of breath on exertion        4. Hyperlipidemia, unspecified hyperlipidemia type        5. Former smoker        6. Body mass index (BMI) 22.0-22.9, adult                 Scribe Attestation  By signing my name below, Jing CASTILLO LPN   , Scribe   attest that this documentation has been prepared under the direction and in the  presence of Shai Saenz MD.     Provider Attestation - Scribe documentation    All medical record entries made by the Scribe were at my direction and personally dictated by me. I have reviewed the chart and agree that the record accurately reflects my personal performance of the history, physical exam, discussion and plan.

## 2024-10-09 NOTE — PATIENT INSTRUCTIONS
Please bring all medicines, vitamins, and herbal supplements with you when you come to the office.    Prescriptions will not be filled unless you are compliant with your follow up appointments or have a follow up appointment scheduled as per instruction of your physician. Refills should be requested at the time of your visit.     One year  Same meds

## 2025-03-06 ENCOUNTER — TELEPHONE (OUTPATIENT)
Dept: CARDIOLOGY | Facility: CLINIC | Age: 55
End: 2025-03-06
Payer: COMMERCIAL

## 2025-03-06 NOTE — TELEPHONE ENCOUNTER
Patient left voicemail requesting an appointment with Dr. Shai Saenz MD. I called patient back and she stated she has been having chest pain for one month. She stated she is not currently having it, but it is intermittent over the month. She stated it goes to her left shoulder and arm. She also stated her arm will occasionally be weak or painful. She stated she also gets nausea. I asked patient if she has been to the ER and that we would recommend she goes. She has not went and prefers to just see Dr. Shai Saenz MD in office.   I advised her I will schedule her for Dr. Shai Saenz MD first available appointment (next week 3/13 @ 3:20). I also sent her to the nurse line to discuss with nursing.

## 2025-03-06 NOTE — TELEPHONE ENCOUNTER
Patient contacted. Patient states chest discomfort radiating to neck, shoulder and heaviness in left arm, can occur with rest or activity or after eating. Duration-30 min or less. Frequency-3 times weekly but recently daily. Onset-one month  Patient started baby asa.  To go to er for unresolved symptom. No symptoms at present    To Dr. Shai Saenz MD      As certified below, I, or a nurse practitioner or physician assistant working with me, had a face-to-face encounter that meets the physician face-to-face encounter requirements.

## 2025-03-10 ENCOUNTER — HOSPITAL ENCOUNTER
Dept: HOSPITAL 101 - PM | Age: 55
Discharge: HOME | End: 2025-03-10
Payer: COMMERCIAL

## 2025-03-10 DIAGNOSIS — M48.062: Primary | ICD-10-CM

## 2025-03-10 PROCEDURE — G0463 HOSPITAL OUTPT CLINIC VISIT: HCPCS

## 2025-03-10 NOTE — P.CN_ITS
Consult Note: HPI    
Data of Consult    
Patient: new to practice    
Consult date: 03/10/25    
Requesting Physician:     
Jyoti Chris MD    
    
Primary Care Provider:     
Leah Yuan MD    
    
Consult Narrative    
Reason for consult: low back, bilateral leg pain    
Narrative:     
[id number] who presents for evaluation. longstanding low back, bilateral lower   
extremity pain. has engaged in a series of provider directed home exercises >6   
weeks, without lasting benefit. lumbar xr with extensive degenerative changes.   
uses otc pain meds as needed.    
cc::     
CC: Jyoti Chris MD    
    
    
Review of Systems    
    
    
ROS      
    
 Status of ROS                          10 or more systems reviewed and unremark    
able except as noted in     
history and below       
    
    
PFSH    
PFSH    
Social History (Reviewed 03/10/25 @ 14:08 by Jyoti Chris MD)    
Smoking status:  Never smoker     
    
    
    
Meds    
Home Medications and Allergies    
                                    Allergies    
    
    
    
Allergy/AdvReac Type Severity Reaction Status Date / Time    
     
No Known Drug Allergies Allergy   Verified 01/11/24 22:18    
    
    
    
    
Exam    
Narrative    
Exam Narrative:     
Psych-alert and oriented x 3. Attentive and appropriate, constitutionally   
normal, displays normal mood and affect per situation. There are no obvious   
deficits in memory, reasoning, or intellect.?    
Skin-no obvious rashes, bruising, erythema noted to the patient's area of pain.?    
Extremities- extremities are warm with minimal edema and palpable pulses.    
Lumbar-tenderness to palpation noted in the lumbar spine and paraspinal   
musculature. Pain is elicited with flexion, extension, and lateral rotation of   
the lumbar spine. Range of motion is diminished with these motions. Facet   
loading maneuvers are positive.    
Strength-noted to be unremarkable    
Sensory-no notable sensory deficits in the bilateral lower extremities to touch   
or pinprick in all dermatomal distributions with the exception to decreased   
sensation to the bilateral L4, 5 dermatomal distribution    
Coordination remains intact.?    
Gait remains non-antalgic.    
    
Assessment and Plan    
Assessment and Plan    
(1) Lumbar stenosis with neurogenic claudication:     
    
Plan    
54yof who presents for evaluation. failed conservative measures, as noted.   
imaging reviewed, as noted. given symptoms and imaging, prudent to obtain lumbar  
mri without contrast for further info. she is in agreement. meds reviewed, no   
changes. follow up after imaging.

## 2025-03-10 NOTE — XMS_ITS
Comprehensive CCD (C-CDA v2.1)  
  
                           Created on: March 10, 2025  
  
  
Kathi Self  
External Reference #: CDR,PersonID:[id number]  
: 1970  
Sex: Female  
  
Demographics  
  
  
                                        Address             [place]  [address]  
   
                                        Mobile Phone        [phone]  
   
                                        Preferred Language  en  
   
                                        Marital Status        
   
                                        [employer] Affiliation Unknown  
   
                                        Race                White  
   
                                        Ethnic Group        Not  or Lati  
no  
  
  
Author  
  
  
                                        Organization        [facility]  
  
  
Care Team Providers  
  
  
                                Care Team Member Name Role            Phone  
   
                                ROQUE SPEARS Unavailable     Unavailable  
   
                                BRITTNEY MEZA   Unavailable     Unavailable  
   
                                MABEL, RADHA Admitting       Unavailable  
   
                                MABEL, RADHA Attending       Unavailable  
   
                                REQUEST,  NONE LISTED Primary Care    Unavaila  
ble  
   
                                MABEL, RADHA Consulting      Unavailable  
   
                                MABEL, RADHA Admitting       Unavailable  
   
                                MABEL, RADHA Attending       Unavailable  
   
                                REQUEST,  NONE LISTED Primary Care    Unavaila  
ble  
   
                                MABEL, RADHA Consulting      Unavailable  
   
                                ROSS, MIKAELA EDWIN Admitting       Unavailable  
   
                                ROSS, MIKAELA EDWIN Attending       Unavailable  
   
                                REQUEST,  NONE LISTED Primary Care    Unavaila  
ble  
   
                                ROSS, MIKAELA EDWIN Consulting      Unavailable  
   
                                Brittney Meza Unavailable     [phone]  
   
                                Unavailable     Unavailable     [phone]  
   
                                MD Brittney Meza Primary Care Provider [phone]   
   
                                MD Roque Spears Attending Provider [phone]  
   
                                Tory, Dr. Morales Referring       Unavaila  
ble  
   
                                Tory, Dr. Morales Attending       Unavaila  
arturo Meza, Dr. Brittney Finley Primary Care    Unav  
ailclover Meza, Dr. Brittney Finley Primary Care    Unav  
ailable  
   
                                Tory, Dr. Morales Referring       Unavaila  
ble  
   
                                Tory, Dr. Morales Attending       Unavaila  
ble  
   
                                Kwabena, Dr. Brittney Finley Primary Care    Unav  
ailable  
   
                                Tory, Dr. Morales Attending       Unavaila  
ble  
   
                                Tory, Dr. Morales Attending       Unavaila  
ble  
   
                                Kwabena, Dr. Brittney Finley Primary Care    Unav  
ailable  
   
                                Fiona Bland Unavailable     [phone]  
[phone]  
   
                                Brittney Meza   Unavailable     [phone]  
   
                                Drew Mejia  Unavailable     [phone]  
   
                                Meza, MD Brittney E Primary Care Provider [phone]  
  
   
                                MD Karlie Kuo Emergency Provider [phone]  
   
                                MD Brittney Meza Attending Provider [phone]  
   
                                Brittney Meza MD Primary Care Provider [phone]   
   
                                Brittney Meza MD Primary Care Provider [phone]  
   
                                Visci DO, Bree A Unavailable     [phone]  
   
                                MD Brittney Meza Primary Care Provider [phone]  
  
   
                                MD Brittney Meza Attending Provider [phone]  
   
                                STEFANIA Self Attending Provider [phone]  
[phone]  
   
                                Brittney Meza MD Primary Care Provider [phone]   
   
                                Brittney Meza MD Attending Provider [phone]  
   
                                Danilo AGUIAR, Kathi OLIVERA Attending Provider [phone]  
[phone]  
   
                                Karlie Kuo Admitting       Unavailable  
   
                                Karlie Kuo Attending       Unavailable  
   
                                Meza, Brittney E Primary Care    Unavailable  
   
                                Meza, Brittney E Primary Care    Unavailable  
   
                                Meza, Brittney E Attending       Unavailable  
   
                                Meza, Brittney E Admitting       Unavailable  
   
                                Meza, Brittney E Admitting       Unavailable  
   
                                Meza, Brittney E Primary Care    Unavailable  
   
                                Meza, Brittney E Attending       Unavailable  
   
                                Meza, Brittney E Primary Care    Unavailable  
   
                                Meza, Brittney E Attending       Unavailable  
   
                                Meza, Brittney E Admitting       Unavailable  
   
                                Meza, Brittney E Primary Care    Unavailable  
   
                                Meza, Brittney E Attending       Unavailable  
   
                                Meza, Brittney E Admitting       Unavailable  
   
                                Meza, Brittney E Primary Care    Unavailable  
   
                                Kathi Self L    Admitting       Unavailable  
   
                                [place] fer[place] L    Attending       Unavailable  
   
                                Meza, Brittney E Primary Care    Unavailable  
   
                                Meza, Brittney E Attending       Unavailable  
   
                                Meza, Brittney E Admitting       Unavailable  
   
                                VISCI, BREE A Attending       Unavailable  
   
                                DANILO, KATHI      Attending       Unavailable  
   
                                DANILO, KATHI      Attending       Unavailable  
   
                                VISCI, BREE A Referring       Unavailable  
   
                                VISCI, BREE A Attending       Unavailable  
   
                                TRABALESSIA, SALOMEF Attending       Unavailable  
   
                                TRABOULMAHESH, MOURHAF Referring       Unavailable  
   
                                MEZA, BRITTNEY E Primary Care    Unavailable  
  
  
  
Allergies  
  
  
                                                    Allergy   
Classification                          Reported   
Allergen(s)               Allergy Type              Date of   
Onset                     Reaction(s)               Facility  
   
                                                      
(19 sources)                            Acetaminophen;   
Translations:   
[acetaminophen] Drug Allergy    2024      [facility]  
   
                                                      
(19 sources)                            Propoxyphene;   
Translations:   
[propoxyphene]  Drug Allergy    2024      [facility]  
  
  
  
Medications  
Current Medications  
  
  
  
                      Medication Drug Class(es) Dates      Sig (Normalized) Sig   
(Original)  
   
                                                    clobetasol   
propionate 0.5 mg/ml   
topical cream  
(1 source)                Corticosteroid            Start:   
2024  
End:   
10-                                          clobetasol (Temovate)   
0.05 % cream   
Indications: Vaginal   
pain Apply 1   
application topically   
in the morning and 1   
application before   
bedtime. Do all this   
for 7 days. Apply to   
affected area twice a   
day. 45 g 2024   
10/01/2024 Active  
   
                                                    estradiol 1 mg /   
norethindrone   
acetate 0.5 mg oral   
tablet  
(20 sources)              Estrogen                  Start:   
2025  
End:   
2026                                          estradiol-norethindro  
ne (ActivElla) 1-0.5   
MG tablet   
Indications:   
Postmenopausal HRT   
(hormone replacement   
therapy) Take 1   
tablet by mouth Daily   
30 tablet 2025   
Active  
  
  
  
                                Start: 2024                 Estradiol-Nore  
thindrone Acet 1-0.5 mg   
tablet Active 1 TAB PO Daily May 7th, 2024   
11:00pm FreeTextSi tablet Orally Once   
a day; Note: Source Status: Taking;   
Provider: Kwabena Lynn (NPI: [id number])  
   
                                        Start: 2024   take 1 tablet by roma  
th   
once daily                              Estradiol-Norethindrone Acet Active 1 TA  
B   
PO Daily May 8th, 2024 12:00am   
FreeTextSi tablet Orally Once a day;   
Note: Source Status: Taking; Provider:   
Kwabena Lynn (NPI: [id number])  
   
                                                    Start: 2024  
End: 2025                                     estradiol-norethindrone (Act  
ivElla) 1-0.5   
MG tablet Indications: Postmenopausal HRT   
(hormone replacement therapy) Take 1   
tablet by mouth in the morning. 30 tablet   
2024 Discontinued   
(Reorder)  
   
                                                            take 1 tablet by roma  
th   
every twenty-four hours                 Mimvey 1-0.5 MG 1 tablet Orally Once a d  
ay   
Active  
   
                                                            take 1 tablet by roma  
th   
every twenty-four hours                   
   
                                                            take 1 tablet by roma  
th   
every twenty-four hours                   
  
  
  
                                                    famotidine 40 mg   
oral tablet  
(20 sources)                            Histamine-2   
Receptor Antagonist       Start: 10-         take 1 tablet   
by mouth at   
bedtime                                 famotidine   
(Pepcid) 40 MG   
tablet Take 40 mg   
by mouth at   
bedtime   
10/12/2024 Active  
  
  
  
                                        Start: 2024   take 1 tablet by roma  
th once   
daily at bedtime                        Famotidine (Pepcid Ac) 20 mg tablet   
Active 20 MG PO Daily May 7th, 2024   
11:00pm FreeTextSi tablet every   
HS; Note: Source Status: Taking;   
Provider: Kwabena Lynn (NPI:   
[id number])  
  
  
  
                                                    fluconazole 150 mg oral   
tablet  
(14 sources)    Azole Antifungal Start: 2025                 fluconazole (  
Diflucan)   
150 MG tablet   
Indications:   
Vulvovaginal Candidiasis   
1 tab now-repeat in 4   
days then take 1 pill   
weekly for 8 weeks 10   
tablet 2025 Active  
  
  
  
                                                    Start: 2024  
End: 2024                                     fluconazole (Diflucan) 150 M  
G tablet   
Indications: Vaginal yeast infection   
Take 1 tablet (150 mg) by mouth   
every 3rd (third) day if needed   
(take one tablet now and repeat in   
48-72 hours as needed) for up to 3   
days Repeat in 7 days if symptoms   
persist. 2 tablet 2024 Active  
   
                                        Start: 2023   take 1 tablet by roma  
th every   
twenty-four hours                         
  
  
  
                                                    ibuprofen 800 mg   
oral tablet  
(14 sources)                            Nonsteroidal   
Anti-inflammatory Drug                  Start:   
2024                              take 1 tablet   
by mouth every   
eight hours as   
needed for   
pain                                    Ibuprofen 800 mg   
tablet Active 800   
MG PO Q8H as   
needed for pain 30   
   
11:00pm  
   
                                                    lidocaine 0.05   
mg/mg medicated   
patch  
(14 sources)                            Antiarrhythmic, Amide   
Local Anesthetic                        Start:   
2024                              apply 1 dose   
topically once   
daily                                   Lidocaine   
(Lidoderm) 5 %   
adhesive   
patch,medicated   
Active 1 PATCH   
TOPICAL Daily 15   
   
11:00pm leave on   
most painful area   
for up to 12 hrs  
   
                                                    mebendazole 100   
mg chewable   
tablet  
(2 sources)               Antihelminthic            Start:   
10-  
End: 2024                                     mebendazole   
(Vermox) 100 MG   
chewable tablet   
Indications:   
Diarrhea,   
unspecified type   
Chew 1 tablet (100   
mg) in the morning   
and 1 tablet (100   
mg) before   
bedtime. Do all   
this for 3 days. 6   
tablet 1   
10/31/2024   
2024 Active  
   
                                                    [place] (No   
Known Home Meds)  
(1 source)                                          Start:   
2020                                          [place] (No Known   
Home Meds) Active   
2020   
12:00am  
   
                                                    nystatin 100   
unt/mg topical   
ointment  
(20 sources)              Polyene Antifungal        Start:   
2024  
End: 2025                                     nystatin   
(Mycostatin)   
ointment   
Indications:   
Vulvar itching   
Apply topically 2   
(two) times a day   
Thin amount in   
combination with   
Triamcinolone   
equal parts 30 g   
2024 Active  
  
  
  
                                                    Start: 2024  
End: 2024           take 4 mL by mouth four times daily   
   
                                Start: 2023 take 4 mL by mouth four times   
daily   
   
                                Start: 2023 take 4 mL by mouth four times   
daily   
  
  
  
                                                    omeprazole 40 mg   
delayed release   
oral capsule  
(20 sources)                            Proton Pump   
Inhibitor                 Start: 2025         take 1 capsule   
by mouth once   
daily                                   Omeprazole 40 mg   
capsule,delayed   
release(DR/EC) Active   
40 MG PO Daily    
12:00am  
  
  
  
                                Start: 2024                 OMEPRAZOLE PO   
2024 Active  
   
                                                    Start: 2024  
End: 2025                         take 1 tablet by mouth once   
daily in the morning                    Omeprazole 20 mg tablet,delayed   
release (DR/EC) Discontinued MG PO   
May 7th, 2024 11:00pm 2025 8:49am FreeTextSi   
tablet every am; Note: Source   
Status: Taking; Provider: Kwabena Lynn (NPI: [id number])  
   
                                                      
End: 2023                         take 1 capsule by mouth once   
daily before mealtime                   omeprazole (PriLOSEC) 20 mg DR   
capsule Take 1 capsule (20 mg) by   
mouth once daily in the morning.   
Take before meals. Do not crush or   
chew. Active  
   
                                                                Omeprazole 20 MG  
 1 tablet every am   
Active  
  
  
  
                                                    ondansetron 4 mg   
disintegrating oral   
tablet  
(11 sources)                            Serotonin-3   
Receptor   
Antagonist                              Start:   
10-  
End:   
2025                              take 1   
tablet by   
mouth   
every   
eight   
hours                                   Ondansetron 4 mg   
tablet,disintegrating   
Active 4 MG PO Q8H    
8:48am  
   
                                                    sulfamethoxazole 800   
mg / trimethoprim   
160 mg oral tablet  
(14 sources)                            Dihydrofolate   
Reductase   
Inhibitor   
Antibacterial,   
Sulfonamide   
Antimicrobial                           Start:   
10-                                          sulfamethoxazole-trimeth  
oprim (Bactrim DS)   
800-160 MG per tablet 1   
tablet 10/14/2024 Active  
  
  
  
                                                    Start: 10-  
End: 2024                         take 1 tablet by mouth   
twice daily                             Sulfamethoxazole-Trimethoprim 800-160 mg  
   
tablet Discontinued 1 TAB PO Twice daily  11:00pm 2024   
11:40am  
   
                                        Start: 12-   take 1 tablet by roma  
th   
every twelve hours                      Bactrim -160 MG 1 tablet Orally Tw  
ice a   
day for 5 days 15 Dec, 2023 Active  
  
  
  
                                                    triamcinolone acetonide   
0.001 mg/mg topical   
ointment  
(11 sources)    Corticosteroid  Start: 2024                 triamcinolone   
(Kenalog)   
0.1 % ointment   
Indications: Vulvar   
itching Apply topically 2   
(two) times a day 30 g   
2024 Active  
  
  
  
Completed/Discontinued Medications  
  
  
  
                      Medication Drug Class(es) Dates      Sig (Normalized) Sig   
(Original)  
   
                                                    acetaminophen 300 mg /   
codeine phosphate 30   
mg oral tablet  
(20 sources)              Opioid Agonist            Start:   
2022                              take 1 tablet by   
mouth three times   
daily as needed                           
  
  
  
                                        Start: 2022   take 1 tablet by roma  
th   
three times daily as   
needed                                  Acetaminophen-Codeine 300-30mg   
acetaminophen-codeine 300-30mg, 1 (one)   
Tablet three times daily, as needed # 60,   
2022, No Refill. Active oral three   
times daily, as needed for 0 *Pick   
strength-form from [facility] for eRX*  Active  
   
                                                    Start: 2022  
End: 2025                         take 1 tablet by mouth   
three times daily as   
needed                                  Acetaminophen-Codeine 300-30 mg tablet   
Discontinued TAB PO May 7th, 2024 11:00pm   
May 9th, 2024 10:48am FreeTextSig:   
acetaminophen-codeine 300-30mg, 1 (one)   
Tablet three times daily, as needed # 60,   
2022, No Refill. Active oral three   
times daily, as needed; Note: Source   
Status: Taking*Pick strength-form from   
[facility] for eRX*; Refills: 0; Provider:   
Kwabena Lynn (NPI: [id number])  
   
                                                    Start: 10-  
End: 2020                         take 1 tablet by mouth   
once daily as needed for   
pain                                    Acetaminophen-Codeine 300-30 mg tablet   
Discontinued 300 MG PO Daily as needed   
for Pain 2017 11:00pm   
2020 1:22pm  
  
  
  
                                                    ALPRAZolam 0.25 mg   
oral tablet  
(20 sources)              Benzodiazepine            Start: 2024  
End: 2025                         take 1 tablet   
by mouth twice   
daily as needed   
for anxiety                             Alprazolam 0.25 mg   
tablet Discontinued   
0.25 MG PO Twice   
daily as needed for   
anxiety 60  1:45pm 2024 3:35pm  
  
  
  
                                                    Start: 2024  
End: 2024                         take 1 tablet by mouth once   
daily as needed for anxiety             Alprazolam 0.25 mg tablet   
Discontinued 0.25 MG PO Daily as   
needed for anxiety 30 30 May 9th,   
2024 10:46am 2024 8:29am  
   
                                        Start: 2024   take 1 tablet by roma  
th every   
twenty-four hours                       ALPRAZolam 0.25 MG 1 tablet Orally   
daily for 30 days    
Active  
   
                                        Start: 12-   take 1 tablet by roma  
th every   
twenty-four hours                       ALPRAZolam 0.25 MG 1 tablet Orally   
daily for 30 days 15 Dec, 2023   
Active  
   
                                        Start: 2023   take 1 tablet by roma  
th every   
twenty-four hours                       ALPRAZolam 0.25 MG 1 tablet Orally   
daily for 30 days    
Active  
   
                                        Start: 2023   take 1 tablet by roma  
th every   
twenty-four hours                       ALPRAZolam 0.25 MG 1 tablet Orally   
daily for 30 days 26 Sep, 2023   
Active  
   
                                        Start: 2022   take 1 tablet by roma  
th every   
twenty-four hours                       ALPRAZolam 0.25 MG 1 tablet Orally   
daily for 30 days    
Active  
  
  
  
                                                    {1 (ascorbic acid   
7540 MG /   
polyethylene glycol   
3350 39213 MG /   
potassium chloride   
1200 MG / sodium   
ascorbate 01438 MG /   
sodium chloride 3200   
MG Powder for Oral   
Solution) / 1   
(polyethylene glycol   
3350 374812 MG /   
potassium chloride   
1000 MG / sodium   
chloride 2000 MG /   
sodium sulfate 9000   
MG Powder for Oral   
Solution) } Pack   
[Plenvu]  
(1 source)                              Osmotic   
Laxative,   
Vitamin C                               Start:   
10-                                          Plenvu 140 GM dose 1   
pouch at 4pm, dose 2   
pouch A & B at 11pm   
Orally BID for 1 days   
BIN:[id number] PCN: [id number]   
GROUP:[id number]   
ID:[id number]   
Oct, 2019 Not-Taking  
   
                                                    cyclobenzaprine   
hydrochloride 10 mg   
oral tablet  
(20 sources)              Muscle Relaxant           Start:   
2024  
End: 2025                         take 1   
tablet by   
mouth every   
eight hours                             Cyclobenzaprine 10 mg   
tablet Discontinued   
10 MG PO Q8H 15    
11:00pm 2024 10:52am  
  
  
  
                                                    Start: 2024  
End: 10-                         take 1 tablet by mouth three   
times daily as needed                   Cyclobenzaprine 10 mg tablet   
Discontinued MG PO May 7th, 2024   
11:00pm 2024 10:16am   
FreeTextSi tablet Orally three   
times daily prn; Note: Source Status:   
Refill; Refills: 0; Qty: 60 Tablet;   
Provider: Kwabena BARRON  
   
                                Start: 2022                 Cyclobenzaprin  
e HCl - 10 MG Oral   
Tablet TAKE TABLET PRN Quantity: 0   
Refills: 0 Ordered: 4-Oct-2022 DO   
Start : 4-Oct-2022 Active  
  
  
  
                                                    FLUoxetine 10 mg oral   
capsule  
(20 sources)                            Serotonin Reuptake   
Inhibitor                               Start:   
2024  
End:   
2024                              take 1   
capsule by   
mouth once   
daily                                   Fluoxetine 10 mg   
capsule Discontinued   
10 MG PO Daily 30 May   
31st, 2024 12:27pm   
2024   
11:12am  
   
                                                    methylPREDNISolone 4   
mg oral tablet  
(20 sources)              Corticosteroid            Start:   
2024  
End:   
10-                                          Methylprednisolone 4   
mg tablets,dose pack   
Discontinued 0 PO per   
package directions    
11:00pm 2024 8:46am PO PER   
PKG DIR for 6 days  
  
  
  
                                                    Start: 2024  
End: 10-                                     Methylprednisolone Discontin  
ued 0 PO per package directions  12:00am 2024 9:46am PO PER PKG DIR   
for 6 days  
   
                                Start: 2024                 Methylpredniso  
lone Active 0 PO per package directions  12:00am PO PER PKG DIR for 6 days  
   
                                Start: 2023                   
  
  
  
                                                    metroNIDAZOLE 500   
mg oral tablet  
(5 sources)                             Nitroimidazole   
Antimicrobial                           Start:   
2024  
End: 2024                         take 1   
tablet by   
mouth in the   
morning                                 metroNIDAZOLE   
(Flagyl) 500 MG   
tablet   
Indications: BV   
(bacterial   
vaginosis) Take 1   
tablet (500 mg) by   
mouth in the   
morning and 1   
tablet (500 mg)   
before bedtime. Do   
all this for 7   
days. 14 tablet   
2024   
Discontinued   
(Other)  
   
                                                    Plenvu Bowel Prep   
PEG 3350 140g,   
Sodium Ascorbate   
48.11g, Sodium   
Sulfate 9g,   
Ascorbic Acid   
7.54g, Sodium   
Chloride 5.2g,   
Potassium Chloride   
2.2g  
(1 source)                                                      Plenvu Bowel Pre  
p   
PEG 3350 140g,   
Sodium Ascorbate   
48.11g, Sodium   
Sulfate 9g,   
Ascorbic Acid   
7.54g, Sodium   
Chloride 5.2g,   
Potassium Chloride   
2.2g Dose 1 pouch   
at 4pm as directed   
and pouches A and   
B at 11pm as   
directed orally   
one day before   
your procedure for   
1 DAY Not-Taking  
   
                                                    predniSONE 20 mg   
oral tablet  
(19 sources)                                        Start:   
2024  
End: 10-                         take 2   
tablets by   
mouth once   
daily                                   Prednisone 20 mg   
tablet   
Discontinued 40 MG   
PO Daily 10  11:00pm   
2024   
8:46am  
  
  
  
                                                    Start: 2024  
End: 10-           take 40 mg by mouth once daily Prednisone Discontinued  
 40 MG PO   
Daily 10  12:00am   
2024 9:46am  
   
                                        Start: 2023   take 1 tablet by roma  
th every   
twenty-four hours                       predniSONE 10 MG 1 tablet Orally   
Once a day for 30 day(s) 26 Sep,   
2023 Active  
  
  
  
Problems  
Active Problems  
  
  
                      Problem Classification Problem    Date       Documented Da  
te Episodic/Chronic  
   
                                                    Abdominal pain  
(12 sources)                            Generalized abdominal   
pain; Translations:   
[Generalized   
abdominal pain]                         Onset:   
11-                10-                Episodic  
   
                                                    Anxiety disorders  
(20 sources)                            Anxiety disorder;   
Translations: [Other   
specified anxiety   
disorders]                              Onset:   
2010                                          Chronic  
   
                                                    Cardiac dysrhythmias  
(10 sources)                            Multiple premature   
ventricular   
complexes;   
Translations: [Other   
premature beats]                        Onset:   
08-                10-                Chronic  
   
                                                    Digestive congenital   
anomalies  
(14 sources)                            Disorder of tongue;   
Translations: [Other   
congenital   
malformations of   
tongue]                                                     Chronic  
   
                                                    Disorders of lipid   
metabolism  
(10 sources)                            Hyperlipidemia;   
Translations: [Other   
and unspecified   
hyperlipidemia]                         Onset:   
08-                10-                Chronic  
   
                                                    Genitourinary symptoms   
and ill-defined   
conditions  
(17 sources)                            Dysuria;   
Translations:   
[Increased frequency   
of urination]                           Onset:   
10-                                          Episodic  
   
                                                    Headache; including   
migraine  
(1 source)                              Tension-type   
headache,   
unspecified,   
intractable                                                 Episodic  
   
                                                    Immunizations and   
screening for   
infectious disease  
(6 sources)                             Encounter for   
immunization;   
Translations:   
[Patient encounter   
status]                                 Onset:   
10-                                          Episodic  
   
                                                    Lymphadenitis  
(4 sources)                             Chronic   
lymphadenitis;   
Translations:   
[Chronic   
lymphadenitis, except   
mesenteric]                             Onset:   
2013                                          Chronic  
   
                                                    Menopausal disorders  
(2 sources)                             Postmenopausal state;   
Translations:   
[Hormone replacement   
therapy]                                2025          Episodic  
   
                                                    Mycoses  
(5 sources)                             Candidal stomatitis;   
Translations:   
[Candidiasis of   
vagina]                                                     Episodic  
   
                                                    Nausea and vomiting  
(12 sources)                            Nausea; Translations:   
[Nausea]                                Onset:   
10-                10-                Episodic  
   
                                                    Other circulatory   
disease  
(4 sources)                             Elevated   
blood-pressure   
reading without   
diagnosis of   
hypertension;   
Translations:   
[Elevated   
blood-pressure   
reading, without   
diagnosis of   
hypertension]                                               Episodic  
   
                                                    Other connective   
tissue disease  
(4 sources)                             Pain in left lower   
limb; Translations:   
[Pain in left leg]                                          Episodic  
   
                                                    Other connective   
tissue disease  
(4 sources)                             Diastasis of muscle;   
Translations:   
[Separation of muscle   
(nontraumatic), other   
site]                                                       Episodic  
   
                                                    Other connective   
tissue disease  
(1 source)      Other muscle spasm                                 Episodic  
   
                                                    Other female genital   
disorders  
(2 sources)                             Pain in female   
genitalia on   
intercourse;   
Translations:   
[Unspecified   
dyspareunia]                            2025          Chronic  
   
                                                    Other female genital   
disorders  
(4 sources)                             Noninflammatory   
disorder of vulva;   
Translations: [Other   
specified   
noninflammatory   
disorders of vulva   
and perineum]                                               Episodic  
   
                                                    Other female genital   
disorders  
(12 sources)                            Vaginal irritation;   
Translations: [Other   
specified   
noninflammatory   
disorders of vagina]                     2024          Episodic  
   
                                                    Other female genital   
disorders  
(9 sources)                             Other specified   
noninflammatory   
disorders of vagina;   
Translations:   
[Unspecified   
noninflammatory   
disorder of vagina]                     2024          Episodic  
   
                                                    Other gastrointestinal   
disorders  
(2 sources)                             Diarrhea;   
Translations:   
[Diarrhea,   
unspecified]                            10-          Episodic  
   
                                                    Other gastrointestinal   
disorders  
(1 source)                              Diarrhea,   
unspecified;   
Translations:   
[Diarrhea,   
unspecified]                            Onset:   
10-                                          Episodic  
   
                                                    Other lower   
respiratory disease  
(8 sources)                             Dyspnea on exertion;   
Translations:   
[Shortness of breath]                   Onset:   
08-                10-                Episodic  
   
                                                    Other nervous system   
disorders  
(1 source)                              Hereditary peripheral   
neuropathy;   
Translations:   
[Unspecified   
hereditary and   
idiopathic peripheral   
neuropathy]                             Onset:   
2019                                          Chronic  
   
                                                    Other nutritional;   
endocrine; and   
metabolic disorders  
(6 sources)                             Abnormal weight loss;   
Translations:   
[Abnormal weight   
loss]                                   10-          Episodic  
   
                                                    Other nutritional;   
endocrine; and   
metabolic disorders  
(6 sources)                             Abnormal weight loss;   
Translations: [Loss   
of weight]                              Onset:   
10-                10-                Episodic  
   
                                                    Other screening for   
suspected conditions   
(not mental disorders   
or infectious disease)  
(20 sources)                            Patient encounter   
status; Translations:   
[Encounter for   
screening for   
malignant neoplasm of   
colon]                                  2020          Episodic  
   
                                                    Pleurisy;   
pneumothorax;   
pulmonary collapse  
(20 sources)                            Pleurisy;   
Translations:   
[Pleurisy]                              Onset:   
09-                10-                Episodic  
   
                                                    Residual codes;   
unclassified  
(12 sources)                            Body mass index 20-24   
- normal;   
Translations: [Body   
Mass Index between   
19-24, adult]                           Onset:   
08-                10-                Episodic  
   
                                                    Residual codes;   
unclassified  
(4 sources)                             Family history of   
breast cancer;   
Translations: [Family   
history of malignant   
neoplasm of breast]                                         Episodic  
   
                                                    Spondylosis;   
intervertebral disc   
disorders; other back   
problems  
(20 sources)                            Sacroiliac joint   
pain; Translations:   
[Sacrococcygeal   
disorders, not   
elsewhere classified]                   Onset:   
2024                Episodic  
   
                                                    Substance-related   
disorders  
(19 sources)                            Nicotine dependence;   
Translations:   
[Nicotine dependence,   
cigarettes, with   
other   
nicotine-induced   
disorders]                              Onset:   
2018                Chronic  
   
                                                    Thyroid disorders  
(3 sources)                             Thyroid nodule;   
Translations:   
[Nontoxic single   
thyroid nodule]                         Onset:   
2024                Chronic  
   
                                                    Unclassified  
(2 sources)                             Chest pain,   
unspecified /   
R07.9(ICD-9)                            Onset:   
10-                                            
   
                                                    Unclassified  
(1 source)                              Shortness of breath /   
R06.02(ICD-9)                           Onset:   
10-                                            
   
                                                    Unclassified  
(2 sources)                             CONTACT W/AND (SUSP)   
EXPOS COVID-19;   
Translations:   
[CONTACT W/AND (SUSP)   
EXPOS COVID-19]                         Onset:   
10-                                            
   
                                                    Unclassified  
(1 source)                              Low back pain,   
unspecified;   
Translations: [Low   
back pain,   
unspecified]                            Onset:   
2024                                            
   
                                                    Viral infection  
(1 source)                              COVID-19;   
Translations:   
[COVID-19]                              Onset:   
10-                                            
  
  
Past or Other Problems  
  
  
                                                    Problem   
Classification  Problem         Date            Documented Date Episodic/Chronic  
   
                                                    Acute bronchitis  
(4 sources)                             Acute bronchitis;   
Translations: [Acute   
bronchitis,   
unspecified]                            Onset:   
2013                                          Episodic  
   
                                                    Inflammatory diseases   
of female pelvic   
organs  
(2 sources)                             Bacterial vaginosis;   
Translations: [Acute   
vaginitis]                              2024          Episodic  
   
                                                    Nonspecific chest   
pain  
(20 sources)                            Chest pain,   
unspecified;   
Translations: [Chest   
pain]                                   Onset:   
10-                                          Episodic  
   
                                                    Other female genital   
disorders  
(2 sources)                             Vaginal discharge;   
Translations: [Other   
specified   
noninflammatory   
disorders of vagina]                     2024          Episodic  
   
                                                    Other inflammatory   
condition of skin  
(2 sources)                             Pruritus of vulva;   
Translations:   
[Pruritus vulvae]                       2024          Episodic  
   
                                                    Other lower   
respiratory disease  
(5 sources)                             Shortness of breath;   
Translations:   
[Shortness of breath]                   Onset:   
2022                                          Episodic  
   
                                                    Other lower   
respiratory disease  
(4 sources)                             Pleuritic pain;   
Translations:   
[Pleurodynia]                           Onset:   
2018                                          Episodic  
   
                                                    Other nervous system   
disorders  
(1 source)                              Altered sensation of   
skin; Translations:   
[Disturbance of skin   
sensation]                              Onset:   
2019                                          Episodic  
   
                                                    Other nutritional;   
endocrine; and   
metabolic disorders  
(4 sources)                             Hypercalcemia;   
Translations:   
[Hypercalcemia]                           
Resolved:   
2021                                          Chronic  
   
                                                    Residual codes;   
unclassified  
(4 sources)                             Tobacco user;   
Translations:   
[Tobacco use]                           Onset:   
2018                                          Episodic  
   
                                                    Residual codes;   
unclassified  
(4 sources)                             Family history of   
cancer; Translations:   
[Family history of   
malignant neoplasm of   
other organs or   
systems]                                  
Resolved:   
2021                                          Episodic  
   
                                                    Residual codes;   
unclassified  
(2 sources)                             Body mass index (BMI)   
22.0-22.9, adult;   
Translations: [Body   
mass index (BMI)   
22.0-22.9, adult]                       Onset:   
2023                                          Episodic  
   
                                                    Screening and history   
of mental health and   
substance abuse codes  
(10 sources)                            Ex-smoker;   
Translations:   
[Personal history of   
tobacco use]                            Onset:   
08-                10-                Episodic  
   
                                        Comment on above:   quit 2019;   
   
                                                    Unclassified  
(1 source)                              CONTACT W/AND (SUSP)   
EXPOS COVID-19;   
Translations:   
[CONTACT W/AND (SUSP)   
EXPOS COVID-19]                         Onset:   
10-                                            
   
                                                    Unclassified  
(1 source)                                          Onset:   
10-                10-                  
  
  
  
Results  
  
  
                          Test Name    Value        Interpretation Reference   
Range                                   Facility  
   
                                                    BI MAMMOGRAM SCREENING TOMOS  
YNTHESIS BILATERALon 2025   
   
                                                    BI MAMMOGRAM SCREENING   
TOMOSYNTHESIS BILATERAL                 This is a summary   
report. The complete   
report is available   
in the patient's   
medical record. If   
you cannot access the   
medical record,   
please contact the   
sending organization   
for a detailed fax or   
copy.  
Examination: BI   
MAMMOGRAM SCREENING   
TOMOSYNTHESIS   
BILATERAL  
Clinical History:   
screening  
Technique: Screening   
digital mammography   
study of both breasts   
was performed with   
2-D and 3-D   
tomosynthesis   
imaging. Study was   
compared to the prior   
exam dated 1/3/2024.  
Findings: There is no   
evidence of interval   
dominant spiculated   
mass, grouped   
microcalcifications,   
or skin thickening   
which would be   
suggestive of   
malignancy.  
A few   
benign-appearing   
calcifications are   
seen bilaterally.   
Partially visualized   
axillary lymph nodes   
are suggested   
bilaterally.  
IMPRESSION:  
Impression: No   
specific evidence of   
malignancy seen in   
either breast.  
BIRADS 2 - Benign   
Findings  
DENSITY: The breasts   
are heterogeneously   
dense, which may   
obscure small masses.  
FOLLOW-UP: Routine   
Screening Mammogram  
ELECTRONICALLY SIGNED   
BY: Tej Ndiaye M.D.                Normal                                  Not Available  
   
                                                    Laboratory - Microbiology an  
d Antimicrobial susceptibilityon 2025   
   
                                                    Bacterial vaginosis and   
vaginitis DNA panel   
Probe+sig amp (Vag fld) Negative                        Negative        [facility]  
   
                                                    No Panel Informationon    
   
                                                    Interpretation and   
review of laboratory   
results         Abnormal                                        [facility]  
   
                      Trichomonas, UA Negative                         [facility]  
   
                      Yeast      Positive                         [facility]  
   
                                                                  [facility]  
   
                                                    US thyroidon 2024   
   
                                         thyroid          [facility] [place] [id number]  
  
Ultrasound Report  
Signed  
  
Patient: Kathi Self   
MR#: [id number]  
  
: 1970   
Acct:[id number]  
  
Age/Sex: 54 / F ADM   
Date: 24  
  
Loc: UL Room: Type:   
REG CLI  
Attending Dr: Brittney Meza MD  
  
Ordering Provider:   
Brittney Meza MD  
Date of Service:   
24  
Accession #:   
([id number] US/US   
thyroid: E04.1 -   
Nontoxic single   
thyroid nodule  
  
  
Copies to: Brittney Meza MD  
  
  
Thyroid Ultrasound  
  
HISTORY: Nontoxic   
thyroid nodule  
  
COMPARISON: None  
  
The RIGHT lobe   
measures 4.7 x 1.4 x   
1.6cm.  
  
LEFT lobe measures   
4.2 x 1.1 x 1.4 cm.  
  
Isthmus has an AP   
dimension of 0.1cm.  
  
Right mid solid   
cystic nodule   
measures up to 11 mm.   
Left mid medial solid   
cystic nodule   
measures up  
to 5 mm. Left   
superior anechoic   
nodule with septation   
measures up to 6 mm.   
Left superior   
anechoic  
nodule with septation   
measures up to 4 mm.   
Right portion of the   
isthmus contains a   
mixed echogenic  
nodule measuring up   
to 7 mm..  
  
No   
microcalcifications   
identified.  
  
Symmetric blood flow   
of the thyroid gland   
identified.  
  
  
ORDER #: [id number]   
US/US thyroid  
IMPRESSION: Bilateral   
thyroid nodules   
measuring up to 11   
mm.  
  
Impression dictated   
by: Zach Le M.D.2024 4:37   
PM  
  
  
Dictation Location:   
[place] [address]  
  
Tech: Jo Lacyley  
  
Transcribed By: [facility]   
24 1637  
Dictated By:   
Zach Le DO   
24 1636  
  
Signed By:  
24 1637       Normal                                  [facility]   
Physician   
Group  
   
                                                    CT abdomen pelvis w conon    
   
                                        CT abdomen pelvis w [facility] [address] [address]  
  
CT Scan Report  
Signed  
  
Patient: Kathi Self   
MR#: [id number]  
  
: 1970   
Acct:[id number]  
  
Age/Sex: 54 / F ADM   
Date: 24  
  
[facility]   
[facility]  
Attending Dr: Brittney Meza MD  
Copies to: Brittney Meza MD  
  
  
  
Ordering Provider:   
Brittney Meza MD  
Date of Service:   
24  
Accession #:   
([id number]) CT/CT   
abdomen pelvis w con:   
R07.9 - Chest pain,   
unspecified  
([id number]) CT/CT   
chest wo/w con: R07.9   
- Chest pain,   
unspecified  
  
  
  
  
CT chest wo/w con, CT   
abdomen pelvis w con   
2024 4:50 PM  
  
SIGN AND SYMPTOMS:   
Bilateral lower rib   
pain, generalized   
abdominal pain,   
weight loss,   
hematuria  
  
CONTRAST: 90 mL of   
intravenous   
Isovue-300  
  
TECHNIQUE:   
Multidetector CT   
axial slices of the   
chest, abdomen and   
pelvis were   
obtainedwith and  
without IV contrast.   
Multiplanar reformats   
were performed and   
viewed on a separate   
workstation and  
reviewed to further   
define anatomy and   
possible pathology.   
CT was performed with   
one or more of the  
following dose   
reduction techniques:   
Automated exposure   
control, adjustment   
of the mA and/or kV  
according to patient   
size, or use of   
iterative   
reconstruction   
technique.  
  
COMPARISON:   
2008.  
  
FINDINGS:  
Lower neck: There is   
an 11 mm   
hypoattenuating   
structure in the   
right thyroid lobe   
which appears to  
be new when compared   
to the prior exam.   
Nonemergent   
ultrasound follow-up   
is recommended as  
malignancy is not   
excluded.  
Vessels: Within   
normal limits. There   
is no evidence of   
pulmonary embolism.  
Mediastinum and Marie:   
Within normal limits.  
Heart: Normal size.   
No pericardial   
effusion.  
Airways: Within   
normal limits  
Lungs: Emphysematous   
changes are present   
in the lung   
parenchyma. There is   
scarring at the lung  
apices. There is   
pleural-based   
scarring at the left   
lung base.  
Pleura: Within normal   
limits.  
Chest Wall: Within   
normal limits.  
  
Abdomen:  
Liver: There is an 8   
mm focus of   
hypoattenuation in   
the right hepatic   
lobe.  
Bile Ducts: Normal   
caliber.  
Gallbladder: No   
calcified gallstones.   
Normal caliber wall.  
Pancreas: within   
normal limits.  
Spleen: within normal   
limits.  
Adrenals: within   
normal limits.  
Kidneys: within   
normal limits.  
  
Pelvis:  
Reproductive Organs:   
The uterine fundus is   
heterogeneously   
enlarged presumably   
relating to uterine  
fibroids. The largest   
measures 5.6 cm in   
greatest transverse   
dimension.  
Ureters: within   
normal limits.  
Bladder: within   
normal limits.  
  
Bowel: There are   
uncomplicated colonic   
diverticula.  
Mesenteric Lymph   
Nodes: No enlarged   
mesenteric lymph   
nodes.  
Peritoneum: No   
ascites or free air,   
no fluid collection.  
Vessels:   
Atherosclerotic   
changes are noted in   
the abdominal aorta   
and its branches..  
Retroperitoneum:   
within normal limits.  
Abdominal Wall:   
within normal limits.  
Bones: Bilateral L5   
pars defects are   
noted with grade 1   
spondylolisthesis of   
L5 upon S1.  
  
  
  
ORDER #: [id number]   
CT/CT chest wo/w con  
IMPRESSION:  
  
No acute   
cardiopulmonary   
pathology or   
intra-abdominal   
pathology.  
  
There is an 11 mm   
hypoattenuating   
structure in the   
right thyroid lobe   
which appears to be   
new when  
compared to the prior   
exam. Nonemergent   
ultrasound follow-up   
is recommended as   
malignancy is not  
excluded.  
  
Emphysematous changes   
are present in the   
lung parenchyma.  
  
There is an 8 mm   
suspected cyst or   
hemangioma in the   
right hepatic lobe.  
  
The uterine fundus is   
heterogeneously   
enlarged presumably   
relating to uterine   
fibroids. The largest  
measures 5.6 cm in   
greatest transverse   
dimension.  
  
Uncomplicated colonic   
diverticula are   
noted.  
  
Impression dictated   
by: Aguila Phillip M.D.2024 7:56 PM  
  
  
Dictation Location:   
[place] [address]  
  
  
  
Transcribed By: [facility]   
24  
Dictated By: Aguila Phillip II, MD   
24  
  
Signed By:  
24       Normal                                  [facility]   
Physician   
Group  
   
                                                    Bacterial culture w IDOrdere  
d By: Kathi Self on 10-   
   
                                                    Bacteria identified Cx   
Nom (Unsp spec)                         Bacterial culture w   
ID                                      .                   [facility]  
   
                                        Comment on above:   Performed at: [place] [id number] Director: Khari Rodriguez PhD, Phone:   
[phone]   
   
                                                    Campy coli+jejuni BD MaxOrde  
red By: Kathi Self on 10-   
   
                                                    C. coli+jejuni tuf gene   
SVITLANA+probe Ql (Stl) Negative                        Negative        [facility]  
   
                                        Comment on above:   Campylobacter test i  
ncludes C. jejuni and C. coli.   
   
                                                    C. coli+jejuni tuf gene   
SVITLANA+probe Ql (Stl)                      Campy coli+jejuni BD   
Max                                     Negative            [facility]  
   
                                        Comment on above:   Campylobacter test i  
ncludes C. jejuni and C. coli.   
   
                                                    Cryptosporidium parv+homin B  
D MaxOrdered By: Kathi Self on 10-   
   
                                                    C. parvum+hominis DNA   
SVITLANA+probe Ql (Stl)                      Cryptosporidium   
parv+homin BD Max                       Negative            [facility]  
   
                                        Comment on above:   Cryptosporidium test  
 includes C. hominis and C. parvum.   
   
                                                    Entamoeba histolytica BD Max  
Ordered By: Kathi Self on 10-   
   
                                                    E. histolytica DNA   
SVITLANA+probe Ql (Stl)                      Entamoeba histolytica   
BD Max                                  Negative            [facility]  
   
                                        Comment on above:   Testing performed by  
 RT-PCR   
   
                                                    Escherichia coli Stx1 and St  
x2 toxin stx1+stx2 genes [Presence] in Stool by SVITLANA   
withOrdered By: Kathi Self on 10-   
   
                                                    E. coli stx1+stx2 genes   
SVITLANA+probe Ql (Stl) Negative                        Negative        [facility]  
   
                                                    Giardia marrufo BD MaxOrdered By  
: Kathi Self on 10-   
   
                                                    G. lamblia DNA SVITLANA+probe   
Ql (Stl)        Giardia marrufo BD Max                 Negative        [facility]  
   
                                                    Ova and Parasite Panelon 10-  
   
   
                                                    Cryptosporidium   
(C.hominis+par  Negative        Normal          Negative        [facility]   
Physician   
Group  
   
                                        Comment on above:   Result Comment: Cryp  
tosporidium test includes C. hominis and   
C. parvum.   
   
                                                            Performed By: #### E  
NT BACT PANEL, OVP ####  
[address]  
#### YERSINIA ONLY ####  
[facility] Acct#[id number]  
,   
   
                      Entamoeba histolytica Negative   Normal     Negative   The  
 [facility]   
Physician   
Group  
   
                                        Comment on above:   Result Comment: Test  
ing performed by RT-PCR  
PERFORMED BY:  
[place] [address]  
[phone]  
PATHOLOGIST MEDICAL DIRECTOR  
DEREK WALKER M.D.   
   
                                                            Performed By: #### E  
NT BACT PANEL, OVP ####  
[place] [address] [place]  
#### YERSINIA ONLY ####  
[facility] Acct#[id number]  
,   
   
                      Giardia lamblia Negative   Normal     Negative   [facility]   
Physician   
Group  
   
                                        Comment on above:   Performed By: #### E  
NT BACT PANEL, OVP ####  
[place] [address] [place]  
#### YERSINIA ONLY ####  
[facility] Acct#[id number]  
,   
   
                                                    Salmonella sp spaO gene [Pre  
sence] in Stool by SVITLANA with probe detectionOrdered   
By:   
Kathi Self on 10-   
   
                                                    Salmonella sp spaO gene   
SVITLANA+probe Ql (Stl) Negative                        Negative        [facility]  
   
                                        Comment on above:   Testing performed by  
 RT-PCR   
   
                                                    Salmonellosis BD MaxOrdered   
By: Kathi Self on 10-   
   
                                                    Salmonella sp spaO gene   
SVITLANA+probe Ql (Stl)                      Salmonella sp spaO   
gene [Presence] in   
Stool by SVITLANA with   
probe detection                         Negative            [facility]  
   
                                        Comment on above:   Testing performed by  
 RT-PCR   
   
                                                    Shigella Tox 1+2 BD MaxOrder  
ed By: Kathi Self on 10-   
   
                                                    E. coli stx1+stx2 genes   
SVITLANA+probe Ql (Stl)                      Escherichia coli Stx1   
and Stx2 toxin   
stx1+stx2 genes   
[Presence] in Stool   
by SVITLANA with                             Negative            [facility]  
   
                                                    Shigella species+EIEC invasi  
on plasmid antigen H ipaH gene [Presence] in Stool   
by   
NAAOrdered By: Kathi Self on 10-   
   
                                                    Shigella species+EIEC   
invasion plasmid antigen   
H ipaH gene SVITLANA+probe Ql   
(Stl)           Negative                        Negative        [facility]  
   
                                        Comment on above:   Shigella sp. test in  
cludes Shigella species and   
Enteroinvasive E. coli (EIEC).   
   
                                                    Shigellosis BD MaxOrdered By  
: Kathi Self on 10-   
   
                                                    Shigella species+EIEC   
invasion plasmid antigen   
H ipaH gene SVITLANA+probe Ql   
(Stl)                                   Shigella species+EIEC   
invasion plasmid   
antigen H ipaH gene   
[Presence] in Stool   
by SVITLANA                                  Negative            [facility]  
   
                                        Comment on above:   Shigella sp. test in  
cludes Shigella species and   
Enteroinvasive E. coli (EIEC).   
   
                                                    Stool Bacterial Panelon 10-3  
1-2024   
   
                      Campylobacter Negative   Normal     Negative   [facility]   
Physician   
Group  
   
                                        Comment on above:   Result Comment: Camp  
ylobacter test includes C. jejuni and C.   
coli.   
   
                                                            Performed By: #### E  
NT BACT PANEL, OVP ####  
[facility]  
[address] [place] [address] [place]  
#### YERSINIA ONLY ####  
[facility] Acct#[id number]  
,   
   
                      Salmonella Species Negative   Normal     Negative   [facility]   
Physician   
Group  
   
                                        Comment on above:   Result Comment: Test  
ing performed by RT-PCR  
PERFORMED BY:  
[facility]  
[address] [place] [address]  
[phone]  
PATHOLOGIST MEDICAL DIRECTOR  
DEREK WALKER M.D.   
   
                                                            Performed By: #### E  
NT BACT PANEL, OVP ####  
[place] [address] [place]  
#### YERSINIA ONLY ####  
[facility] Acct#[id number]  
,   
   
                                                    Shiga Toxin (E coli   
O157+oth)       Negative        Normal          Negative        [facility]   
Physician   
Group  
   
                                        Comment on above:   Performed By: #### E  
NT BACT PANEL, OVP ####  
[place] [address] [place]  
#### YERSINIA ONLY ####  
[facility] Acct#[id number]  
,   
   
                      Shigella Species Negative   Normal     Negative   [facility]   
Physician   
Group  
   
                                        Comment on above:   Result Comment: Shig  
jung sp. test includes Shigella species   
and  
Enteroinvasive E. coli (EIEC).   
   
                                                            Performed By: #### E  
NT BACT PANEL, OVP ####  
[address]  
#### YERSINIA ONLY ####  
[facility] Acct#[id number]  
,   
   
                                                    Yersinia Only Cultureon 10-3  
1-2024   
   
                      Yersinia Result 1 No Yersinia isolated Normal     .         
   [facility]   
Physician   
Group  
   
                                        Comment on above:   Result Comment: Perf  
ormed at:  - [facility] [place] [id number]  
: Khari Rodriguez PhD, Phone: [phone]  
PERFORMED BY:  
[place] [address]  
[phone]  
PATHOLOGIST MEDICAL DIRECTOR  
DEREK WALKER M.D.   
   
                                                            Performed By: #### E  
NT BACT PANEL, OVP ####  
[place] [address] [place]  
#### YERSINIA ONLY ####  
[facility] Acct#[id number]  
,   
   
                      Yersinia Stool Culture Final report Normal     .            
[facility]   
Physician   
Group  
   
                                        Comment on above:   Performed By: #### E  
NT BACT PANEL, OVP ####  
[place] [address] [place]  
#### YERSINIA ONLY ####  
[facility] Acct#[id number]  
,   
   
                                                    Yersinia cultureOrdered By:   
Kathi Self on 10-   
   
                                                    Yersinia sp identified   
Org specific cx Nom   
(Unsp spec)     Yersinia culture                 .               [facility]  
   
                                                    Urinalysis macro (dipstick)   
panel (U)on 10-   
   
                          Bilirubin, UA Negative                  Negative -   
4(70) +++   
mg/dL                                   [facility]  
   
                          Blood, UA    Negative                  Negative -   
50 Vaughn/mcL                              [facility]  
   
                      Clarity, UA Clear                            [facility]  
   
                      Color, UA  Yellow                           [facility]  
   
                          Glucose, UA  Negative                  Negative -   
(110)   
++++ mg/dL                              [facility]  
   
                                                    Interpretation and   
review of laboratory   
results         Normal                                          [facility]  
   
                          Ketones, UA  Negative                  Negative -   
160(16)   
++++ mg/dL                              [facility]  
   
                          Leukocytes, UA Negative                  Negative -   
500+++   
Sophia/mcL                                 [facility]  
   
                          Nitrite, UA  Negative                  Negative -   
Positive                                [facility]  
   
                      pH, UA     6                     5 - 9      [facility]  
   
                          Protein, UA  Negative                  Negative -   
(20)   
++++ mg/dL                              [facility]  
   
                      Spec Grav, UA 1.005                 1 - 1.03   [facility]  
   
                          Urobilinogen, UA 0.2                       0.2 - 12   
mg/dL                                   [facility]  
   
                                                    Alanine aminotransferase [En  
zymatic activity/volume] in Serum or PlasmaOrdered   
By:   
Brittney Meza on 10-   
   
                                                    ALT [Catalytic   
activity/Vol]   21 U/L          Normal                      [facility]  
   
                                        Comment on above:   Performed By: #### C  
MP, CBC, TSH3, LIPASE ####  
[address]   
   
                                                    ALT [Catalytic   
activity/Vol]                           Alanine   
aminotransferase   
[Enzymatic   
activity/volume] in   
Serum or Plasma                                         [facility]  
   
                                                    Albumin [Mass/volume] in Ser  
um or Plasma by Bromocresol green (BCG) dye binding   
methoOrdered By: Brittney Meza on 10-   
   
                                                    Albumin BCG dye   
[Mass/Vol]      4.4 g/dL                        3.5-5.7         [facility]  
   
                                                    Albumin BCG dye   
[Mass/Vol]                              Albumin [Mass/volume]   
in Serum or Plasma by   
Bromocresol green   
(BCG) dye binding   
metho                                   3.5-5.7             [facility]  
   
                                                    Alkaline phosphatase [Enzyma  
tic activity/volume] in Serum or PlasmaOrdered By:   
Brittney Meza on 10-   
   
                                                    ALP [Catalytic   
activity/Vol]   48 U/L          Normal                    [facility]  
   
                                        Comment on above:   Performed By: #### C  
MP, CBC, TSH3, LIPASE ####  
[facility]  
[address] [place] [address]   
   
                                                    ALP [Catalytic   
activity/Vol]                           Alkaline phosphatase   
[Enzymatic   
activity/volume] in   
Serum or Plasma                                       [facility]  
   
                                                    Aspartate aminotransferase [  
Enzymatic activity/volume] in Serum or PlasmaOrdered  
By:   
Brittney Meza on 10-   
   
                                                    AST [Catalytic   
activity/Vol]   23 U/L          Normal          13-39           [facility]  
   
                                        Comment on above:   Performed By: #### C  
MP, CBC, TSH3, LIPASE ####  
[address]   
   
                                                    AST [Catalytic   
activity/Vol]                           Aspartate   
aminotransferase   
[Enzymatic   
activity/volume] in   
Serum or Plasma                         1339               [facility]  
   
                                                    Automated basophil %Ordered   
By: Brittney Meza on 10-   
   
                      Basophils/100 WBC (Bld) 0.5 %      Normal     .          [facility]  
   
                                        Comment on above:   Performed By: #### C  
MP, CBC, TSH3, LIPASE ####  
[address]   
   
                                                    Automated basophil countOrde  
red By: Brittney Meza on 10-   
   
                      Basophils (Bld) [#/Vol] 0.0 10*3/uL Normal     0.0-0.2      
[facility]  
   
                                        Comment on above:   Result Comment: PERF  
ORMED BY:  
[place] [address]  
[phone]  
PATHOLOGIST MEDICAL DIRECTOR  
DEREK WALKER M.D.   
   
                                                            Performed By: #### C  
MP, CBC, TSH3, LIPASE ####  
[address]   
   
                                                    Automated blood monocyte cou  
ntOrdered By: Brittney Meza on 10-   
   
                      Monocytes (Bld) [#/Vol] 0.9 10*3/uL High       0.0-0.8      
[facility]  
   
                                        Comment on above:   Performed By: #### C  
MP, CBC, TSH3, LIPASE ####  
[address]   
   
                                                    Automated eosinophil %Ordere  
d By: Brittney Meza on 10-   
   
                                                    Eosinophils/100 WBC   
(Bld)           1.7 %           Normal          .               [facility]  
   
                                        Comment on above:   Performed By: #### C  
MP, CBC, TSH3, LIPASE ####  
[facility]  
[address]   
   
                                                    Automated eosinophil countOr  
dered By: Brittney Meza on 10-   
   
                                                    Eosinophils (Bld)   
[#/Vol]         0.1 10*3/uL     Normal          0.0-0.45        [facility]  
   
                                        Comment on above:   Performed By: #### C  
MP, CBC, TSH3, LIPASE ####  
[facility]  
[address]   
   
                                                    Automated monocyte %Ordered   
By: Brittney Meza on 10-   
   
                      Monocytes/100 WBC (Bld) 11.7 %     Normal     .          [facility]  
   
                                        Comment on above:   Performed By: #### C  
MP, CBC, TSH3, LIPASE ####  
[facility]  
[address] [place] [address]   
   
                                                    Automated neutrophil %Ordere  
d By: Brittney Meza on 10-   
   
                                                    Neutrophils/100 WBC   
(Bld)           63.8 %          Normal          .               [facility]  
   
                                        Comment on above:   Performed By: #### C  
MP, CBC, TSH3, LIPASE ####  
[facility]  
[address] [place] [address]   
   
                                                    Basophils Auto (Bld) [#/Vol]  
Ordered By: Brittney Meza on 10-   
   
                                        Basophils (Bld) [#/Vol] Automated basoph  
il   
count                                   0.0-0.2             [facility]  
   
                                                    Basophils/100 WBC Auto (Bld)  
Ordered By: Brittney Meza on 10-   
   
                      Basophils/100 WBC (Bld) Automated basophil %            .   
         [facility]  
   
                                                    Bilirubin.total [Mass/volume  
] in Serum or PlasmaOrdered By: Brittney Meza on   
10-   
   
                      Bilirubin [Mass/Vol] 0.5 mg/dL  Normal     0.3-1.0    [facility]  
   
                                        Comment on above:   Performed By: #### C  
MP, CBC, TSH3, LIPASE ####  
[address]   
   
                                        Bilirubin [Mass/Vol] Bilirubin.total   
[Mass/volume] in   
Serum or Plasma                         0.3-1.0             [facility]  
   
                                                    Calcium [Mass/volume] in Ser  
um or PlasmaOrdered By: Brittney Meza on 10-   
   
                      Calcium [Mass/Vol] 9.3 mg/dL  Normal     8.6-10.3   [facility]  
   
                                        Comment on above:   Performed By: #### C  
MP, CBC, TSH3, LIPASE ####  
[facility]  
[address] [place] [address]   
   
                                        Calcium [Mass/Vol]  Calcium [Mass/volume  
]   
in Serum or Plasma                      8.6-10.3            [facility]  
   
                                                    Carbon dioxide, total [Moles  
/volume] in Serum or PlasmaOrdered By: Brittney Meza   
on   
10-   
   
                      CO2 [Moles/Vol] 28.7 mmol/L Normal     21.0-31.0  [facility]  
   
                                        Comment on above:   Performed By: #### C  
MP, CBC, TSH3, LIPASE ####  
[address]   
   
                                        CO2 [Moles/Vol]     Carbon dioxide, tota  
l   
[Moles/volume] in   
Serum or Plasma                         21.0-31.0           [facility]  
   
                                                    Chloride [Moles/volume] in S  
pati or PlasmaOrdered By: Brittney Meza on 10-  
   
   
                      Chloride [Moles/Vol] 102 mmol/L Normal          [facility]  
   
                                        Comment on above:   Performed By: #### C  
MP, CBC, TSH3, LIPASE ####  
[facility]  
[address] [place] [address]   
   
                                        Chloride [Moles/Vol] Chloride   
[Moles/volume] in   
Serum or Plasma                                       [facility]  
   
                                                    Complete Blood Count Auto Di  
ffon 10-   
   
                                                    Mean Corpuscular HGB   
Conc            33.5 g/dL       Normal          32.0-35.0       [facility]   
Physician   
Group  
   
                                        Comment on above:   Performed By: #### C  
MP, CBC, TSH3, LIPASE ####  
[facility]  
[address]   
   
                      NRBC%      0.1 /100{WBC} Normal     0-0.5      [facility]   
Physician   
Group  
   
                                        Comment on above:   Performed By: #### C  
MP, CBC, TSH3, LIPASE ####  
[facility]  
[address] [place] [address] [place]   
   
                                                    Comprehensive Metabolic Pane  
sheryl 10-   
   
                      Albumin [Mass/Vol] 4.4 g/dL   Normal     3.5-5.7    [facility]   
Physician   
Group  
   
                                        Comment on above:   Performed By: #### C  
MP, CBC, TSH3, LIPASE ####  
[facility]  
[address] [place] [address] [place]   
   
                                                    GFR/1.73 sq M.predicted   
MDRD (S/P/Bld) [Vol   
rate/Area]      mL/min/{1.73_m2} Normal                          [facility]   
Physician   
Group  
   
                                        Comment on above:   Performed By: #### C  
MP, CBC, TSH3, LIPASE ####  
[address]   
   
                                                    Creatinine [Mass/volume] in   
Serum or PlasmaOrdered By: Brittney Meza on   
10-   
   
                      Creatinine [Mass/Vol] 1.05 mg/dL Normal     0.60-1.20  [facility]  
   
                                        Comment on above:   Performed By: #### C  
MP, CBC, TSH3, LIPASE ####  
[facility]  
[address]   
   
                                        Creatinine [Mass/Vol] Creatinine   
[Mass/volume] in   
Serum or Plasma                         0.60-1.20           [facility]  
   
                                                    Eosinophils Auto (Bld) [#/Vo  
l]Ordered By: Brittney Meza on 10-   
   
                                                    Eosinophils (Bld)   
[#/Vol]                                 Automated eosinophil   
count                                   0.0-0.45            [facility]  
   
                                                    Eosinophils/100 WBC Auto (Bl  
d)Ordered By: Brittney Meza on 10-   
   
                                                    Eosinophils/100 WBC   
(Bld)                                   Automated eosinophil   
%                                       .                   [facility]  
   
                                                    Erythrocyte distribution wid  
th Auto (RBC) [Ratio]Ordered By: Brittney Meza on   
10-   
   
                                                    Erythrocyte distribution   
width (RBC) [Ratio]                     Erythrocyte   
distribution width   
[Ratio] by Automated   
count                                   11.9-15.3           [facility]  
   
                                                    Erythrocyte distribution wid  
th [Ratio] by Automated countOrdered By: Brittney Meza on   
10-   
   
                                                    Erythrocyte distribution   
width (RBC) [Ratio] 13.8 %          Normal          11.9-15.3       [facility]  
   
                                        Comment on above:   Performed By: #### C  
MP, CBC, TSH3, LIPASE ####  
[facility]  
[address]   
   
                                                    Erythrocytes [#/volume] in B  
lood by Automated countOrdered By: Brittney Meza on   
10-   
   
                      RBC (Bld) [#/Vol] 4.79 10*6/uL Normal     3.60-5.00  [facility]  
   
                                        Comment on above:   Performed By: #### C  
MP, CBC, TSH3, LIPASE ####  
[facility]  
[address]   
   
                                                    Globulin Calc (S) [Mass/Vol]  
Ordered By: Brittney Meza on 10-   
   
                                        Globulin (S) [Mass/Vol] Serum globulin   
measurement by   
calculation   
(mass/volume)                                               [facility]  
   
                                                    Glucose [Mass/volume] in Ser  
um or PlasmaOrdered By: Brittney Meza on 10-   
   
                      Glucose [Mass/Vol] 101 mg/dL  High            [facility]  
   
                                        Comment on above:   ADA recommended refe  
rence rangeRandom Glucose Reference Range   
is dependent on time and content of last meal. Glucose of   
more than 200 mg/dL in a nonstressed, ambulatory subject   
supports the diagnosis of Diabetes Mellitus.   
   
                                                            Result Comment: Rand  
om Glucose Reference Range is dependent   
on time and  
content of last meal. Glucose of more than 200 mg/dL in a  
nonstressed, ambulatory subject supports the diagnosis of  
Diabetes Mellitus.  
ADA recommended reference range   
   
                                                            Performed By: #### C  
MP, CBC, TSH3, LIPASE ####  
[facility]  
[address]   
   
                                        Glucose [Mass/Vol]  Glucose [Mass/volume  
]   
in Serum or Plasma  High                              [facility]  
   
                                        Comment on above:   ADA recommended refe  
rence rangeRandom Glucose Reference Range   
is dependent on time and content of last meal. Glucose of   
more than 200 mg/dL in a nonstressed, ambulatory subject   
supports the diagnosis of Diabetes Mellitus.   
   
                                                    Hematocrit Auto (Bld) [Volum  
e fraction]Ordered By: Brittney Meza on 10-   
   
                                                    Hematocrit (Bld) [Volume   
fraction]                               Hematocrit [Volume   
Fraction] of Blood by   
Automated count                         34.0-46.4           [facility]  
   
                                                    Hematocrit [Volume Fraction]  
 of Blood by Automated countOrdered By: Brittney Meza  
on   
10-   
   
                                                    Hematocrit (Bld) [Volume   
fraction]       42.9 %          Normal          34.0-46.4       [facility]  
   
                                        Comment on above:   Performed By: #### C  
MP, CBC, TSH3, LIPASE ####  
[facility]  
[address] [place] [address]   
   
                                                    Hemoglobin [Mass/volume] in   
BloodOrdered By: Brittney Meza on 10-   
   
                                                    Hemoglobin (Bld)   
[Mass/Vol]      14.4 g/dL       Normal          11.8-15.4       [facility]  
   
                                        Comment on above:   Performed By: #### C  
MP, CBC, TSH3, LIPASE ####  
[address]   
   
                                                    Hemoglobin (Bld)   
[Mass/Vol]                              Hemoglobin   
[Mass/volume] in   
Blood                                   11.8-15.4           [facility]  
   
                                                    Leukocytes [#/volume] correc  
juju for nucleated erythrocytes in Blood by Automated  
   
counOrdered By: Brittney Meza on 10-   
   
                                                    WBC corrected for nucl   
RBC Auto (Bld) [#/Vol] 7.6 10*3/uL                     3.8-11.6        [facility]  
   
                                                    WBC corrected for nucl   
RBC Auto (Bld) [#/Vol]                  Leukocytes [#/volume]   
corrected for   
nucleated   
erythrocytes in Blood   
by Automated coun                       3.8-11.6            [facility]  
   
                                                    Leukocytes [#/volume] in Blo  
od by Automated countOrdered By: Brittney Meza on   
10-   
   
                      WBC (Bld) [#/Vol] 7.6 10*3/uL Normal     3.8-11.6   [facility]  
   
                                        Comment on above:   Performed By: #### C  
MP, CBC, TSH3, LIPASE ####  
[facility]  
[address] [place] [address]   
   
                                                    Lipase [Enzymatic activity/v  
olume] in Serum or PlasmaOrdered By: Brittney Meza on  
  
10-   
   
                                                    Lipase [Catalytic   
activity/Vol]   14.0 U/L        Normal          11.0-82.0       [facility]  
   
                                        Comment on above:   Performed By: #### C  
MP, CBC, TSH3, LIPASE ####  
[facility]  
[address] [place] [address]   
   
                                                    Lipase [Catalytic   
activity/Vol]                           Lipase [Enzymatic   
activity/volume] in   
Serum or Plasma                         11.0-82.0           [facility]  
   
                                                    Lymphocytes Auto (Bld) [#/Vo  
l]Ordered By: Brittney Meza on 10-   
   
                                                    Lymphocytes (Bld)   
[#/Vol]                                 Lymphocytes   
[#/volume] in Blood   
by Automated count                      1.00-4.8            [facility]  
   
                                                    Lymphocytes [#/volume] in Bl  
ood by Automated countOrdered By: Brittney Meza on   
10-   
   
                                                    Lymphocytes (Bld)   
[#/Vol]         1.7 10*3/uL     Normal          1.00-4.8        [facility]  
   
                                        Comment on above:   Performed By: #### C  
MP, CBC, TSH3, LIPASE ####  
[facility]  
[address] [place] [address]   
   
                                                    Lymphocytes/100 WBC Auto (Bl  
d)Ordered By: Brittney Meza on 10-   
   
                                                    Lymphocytes/100 WBC   
(Bld)                                   Lymphocytes/100   
leukocytes in Blood   
by Automated count                      .                   [facility]  
   
                                                    Lymphocytes/100 leukocytes i  
n Blood by Automated countOrdered By: Brittney Meza   
on   
10-   
   
                                                    Lymphocytes/100 WBC   
(Bld)           22.3 %          Normal          .               [facility]  
   
                                        Comment on above:   Performed By: #### C  
MP, CBC, TSH3, LIPASE ####  
[facility]  
[address] [place] [address]   
   
                                                    MCH Auto (RBC) [Entitic mass  
]Ordered By: Brittney Meza on 10-   
   
                                        MCH (RBC) [Entitic mass] MCH [Entitic ma  
ss] by   
Automated count                         24.7-34.3           [facility]  
   
                                                    MCH [Entitic mass] by Automa  
juju countOrdered By: Brittney Meza on 10-   
   
                      MCH (RBC) [Entitic mass] 30.0 pg    Normal     24.7-34.3    
[facility]  
   
                                        Comment on above:   Performed By: #### C  
MP, CBC, TSH3, LIPASE ####  
[facility]  
[address] [place] [address]   
   
                                                    MCHC Auto (RBC) [Mass/Vol]Or  
dered By: Brittney Meza on 10-   
   
                      MCHC (RBC) [Mass/Vol] 33.5 g/dL             32.0-35.0  [facility]  
   
                                        MCHC (RBC) [Mass/Vol] MCHC [Mass/volume]  
 by   
Automated count                         32.0-35.0           [facility]  
   
                                                    MCV Auto (RBC) [Entitic vol]  
Ordered By: Brittney Meza on 10-   
   
                                        MCV (RBC) [Entitic vol] MCV [Entitic vol  
ume]   
by Automated count                                    [facility]  
   
                                                    MCV [Entitic volume] by Auto  
mated countOrdered By: Brittney Meza on 10-   
   
                      MCV (RBC) [Entitic vol] 89.6 fL    Normal          F  
[facility]  
   
                                        Comment on above:   Performed By: #### C  
MP, CBC, TSH3, LIPASE ####  
[facility]  
[address]   
   
                                                    Monocytes Auto (Bld) [#/Vol]  
Ordered By: Brittney Meza on 10-   
   
                                        Monocytes (Bld) [#/Vol] Automated blood   
monocyte count      High                0.0-0.8             [facility]  
   
                                                    Monocytes/100 WBC Auto (Bld)  
Ordered By: Brittney Meza on 10-   
   
                      Monocytes/100 WBC (Bld) Automated monocyte %            .   
         [facility]  
   
                                                    Neutrophils Auto (Bld) [#/Vo  
l]Ordered By: Brittney Meza on 10-   
   
                                                    Neutrophils (Bld)   
[#/Vol]                                 Neutrophils   
[#/volume] in Blood   
by Automated count                      1.8-7.7             [facility]  
   
                                                    Neutrophils [#/volume] in Bl  
ood by Automated countOrdered By: Brittney Meza on   
10-   
   
                                                    Neutrophils (Bld)   
[#/Vol]         4.9 10*3/uL     Normal          1.8-7.7         [facility]  
   
                                        Comment on above:   Performed By: #### C  
MP, CBC, TSH3, LIPASE ####  
[facility]  
[address] [place] [address] [place]   
   
                                                    Neutrophils/100 WBC Auto (Bl  
d)Ordered By: Brittney Meza on 10-   
   
                                                    Neutrophils/100 WBC   
(Bld)                                   Automated neutrophil   
%                                       .                   [facility]  
   
                                                    No Panel InformationOrdered   
By: Brittney Meza on 10-   
   
                      Estimated GFR (CKD-EPI) > 60.0 mL/Min                       
  [facility]  
   
                                                    Pharmacy Creatinine   
Clearance (Chem N/A                                             [facility]  
   
                                                    Nucleated erythrocytes [Pres  
ence] in Blood by Automated countOrdered By: Brittney Meza   
on 10-   
   
                                                    Nucleated RBC Auto Ql   
(Bld)           0.1 /100{WBC}                   0-0.5           [facility]  
   
                                                    Nucleated RBC Auto Ql   
(Bld)                                   Nucleated   
erythrocytes   
[Presence] in Blood   
by Automated count                      0-0.5               [facility]  
   
                                                    Platelet mean volume Auto (B  
ld) [Entitic vol]Ordered By: Brittney Meza on   
10-   
   
                                                    Platelet mean volume   
(Bld) [Entitic vol]                     Platelet mean volume   
[Entitic volume] in   
Blood by Automated   
count                                   6.3-10.7            [facility]  
   
                                                    Platelet mean volume [Entiti  
c volume] in Blood by Automated countOrdered By:   
Brittney Meza on 10-   
   
                                                    Platelet mean volume   
(Bld) [Entitic vol] 8.6 fL          Normal          6.3-10.7        [facility]  
   
                                        Comment on above:   Performed By: #### C  
MP, CBC, TSH3, LIPASE ####  
[facility]  
[address] [place] [address] [place]   
   
                                                    Platelets Auto (Bld) [#/Vol]  
Ordered By: Brittney Meza on 10-   
   
                                        Platelets (Bld) [#/Vol] Platelets [#/vol  
ume]   
in Blood by Automated   
count                                   150-450             [facility]  
   
                                                    Platelets [#/volume] in Bloo  
d by Automated countOrdered By: Brittney Meza on   
10-   
   
                      Platelets (Bld) [#/Vol] 247 10*3/uL Normal     150-450      
[facility]  
   
                                        Comment on above:   Performed By: #### C  
MP, CBC, TSH3, LIPASE ####  
[facility]  
[address] [place] [address] [place]   
   
                                                    Potassium [Moles/volume] in   
Serum or PlasmaOrdered By: Brittney Meza on   
10-   
   
                      Potassium [Moles/Vol] 4.2 mmol/L Normal     3.5-5.1    [facility]  
   
                                        Comment on above:   Performed By: #### C  
MP, CBC, TSH3, LIPASE ####  
[facility]  
[address]   
   
                                        Potassium [Moles/Vol] Potassium   
[Moles/volume] in   
Serum or Plasma                         3.5-5.1             [facility]  
   
                                                    Protein [Mass/volume] in Ser  
um or PlasmaOrdered By: Brittney Meza on 10-   
   
                      Protein [Mass/Vol] 7.2 g/dL   Normal     6.4-8.9    [facility]  
   
                                        Comment on above:   Performed By: #### C  
MP, CBC, TSH3, LIPASE ####  
[address]   
   
                                        Protein [Mass/Vol]  Protein [Mass/volume  
]   
in Serum or Plasma                      6.4-8.9             [facility]  
   
                                                    RBC Auto (Bld) [#/Vol]Ordere  
d By: Brittney Meza on 10-   
   
                                        RBC (Bld) [#/Vol]   Erythrocytes   
[#/volume] in Blood   
by Automated count                      3.60-5.00           [facility]  
   
                                                    Serum globulin measurement b  
y calculation (mass/volume)Ordered By: Brittney Meza   
on   
10-   
   
                      Globulin (S) [Mass/Vol] 2.8 g/dL   Normal                [facility]  
   
                                        Comment on above:   Performed By: #### C  
MP, CBC, TSH3, LIPASE ####  
[address]   
   
                                                    Serum or plasma albumin/glob  
ulin mass ratioOrdered By: Brittney Meza on   
10-   
   
                                                    Albumin/Globulin [Mass   
ratio]          1.6 {ratio}     Normal                          [facility]  
   
                                        Comment on above:   Performed By: #### C  
MP, CBC, TSH3, LIPASE ####  
[address]   
   
                                                    Albumin/Globulin [Mass   
ratio]                                  Serum or plasma   
albumin/globulin mass   
ratio                                                       [facility]  
   
                                                    Serum or plasma anion gap de  
terminationOrdered By: Brittney Meza on 10-   
   
                      Anion gap [Moles/Vol] 11.5 mmol/L Normal     6.0-15.0   [facility]  
   
                                        Comment on above:   Performed By: #### C  
MP, CBC, TSH3, LIPASE ####  
[address]   
   
                                        Anion gap [Moles/Vol] Serum or plasma an  
ion   
gap determination                       6.0-15.0            [facility]  
   
                                                    Sodium [Moles/volume] in Ser  
um or PlasmaOrdered By: Brittney Meza on 10-   
   
                      Sodium [Moles/Vol] 138 mmol/L Normal     136-145    [facility]  
   
                                        Comment on above:   Performed By: #### C  
MP, CBC, TSH3, LIPASE ####  
[facility]  
[address] [place] [address]   
   
                                        Sodium [Moles/Vol]  Sodium [Moles/volume  
]   
in Serum or Plasma                      136-145             [facility]  
   
                                                    Thyrotropin [Units/volume] i  
n Serum or PlasmaOrdered By: Brittney Meza on   
10-   
   
                      TSH Qn     1.04 m[IU]/L Normal     0.45-5.33  [facility]  
   
                                        Comment on above:   Result Comment: PERF  
ORMED BY:  
[place] [address]  
[phone]  
PATHOLOGIST MEDICAL DIRECTOR  
DEREK WALKER M.D.   
   
                                                            Performed By: #### C  
MP, CBC, TSH3, LIPASE ####[facility] [address] [place] [address]   
   
                                        TSH Qn              Thyrotropin   
[Units/volume] in   
Serum or Plasma                         0.45-5.33           [facility]  
   
                                                    Urea nitrogen [Mass/volume]   
in Serum or PlasmaOrdered By: Brittney Meza on   
10-   
   
                      Urea nitrogen [Mass/Vol] 7 mg/dL    Normal              
[facility]  
   
                                        Comment on above:   Performed By: #### C  
MP, CBC, TSH3, LIPASE ####  
[facility]  
[address] [place] [address]   
   
                                        Urea nitrogen [Mass/Vol] Urea nitrogen   
[Mass/volume] in   
Serum or Plasma                                         [facility]  
   
                                                    WBC Auto (Bld) [#/Vol]Ordere  
d By: Brittney Meza on 10-   
   
                                        WBC (Bld) [#/Vol]   Leukocytes [#/volume  
]   
in Blood by Automated   
count                                   3.8-11.6            [facility]  
   
                                                    Laboratory - Chemistry and C  
hemistry - challengeon 10-   
   
                      Bilirubin Ql (U) Negative                         [facility]  
   
                      Glucose (U) [Mass/Vol] Negative                         [facility]  
   
                      Ketones Ql (U) Negative                         [facility]  
   
                      pH (U)     6.5 [pH]                         [facility]  
   
                                                    Specific gravity (U)   
[Rel density]   1.000                                           [facility]  
   
                                                    Urobilinogen (U)   
[Mass/Vol]      0.2 mg/dL                                       [facility]  
   
                                                    Laboratory - Microbiology an  
d Antimicrobial susceptibilityOrdered By: Brittney Meza on   
10-   
   
                                                    Bacteria identified Cx   
Nom (U)         2 Days                                          [facility]  
   
                                                    Laboratory - Specimen inform  
ationon 10-   
   
                      Appearance (U) cloudy                           [facility]  
   
                      Color (U)  yellow                           [facility]  
   
                                                    Laboratory - Urinalysison 10  
-   
   
                                                    Leukocyte esterase Test   
strip Ql (U)    moderate                                        [facility]  
   
                      Nitrite Ql (U) Negative                         [facility]  
   
                      Protein Ql (U) Negative                         [facility]  
   
                                                    No Panel Informationon 10-14  
-2024   
   
                      Urine Occult Blood tracelyced                       [facility]  
   
                                                    Urine Cultureon 10-   
   
                                                    Bacteria identified Cx   
Nom (U)                                 30,000 colonies/ml   
mixed  
bacterial skin   
contaminants  
2 Days  
PERFORMED BY:  
[place] [address]  
[phone]  
PATHOLOGIST MEDICAL   
DIRECTOR  
DEREK WALKER M.D.    Normal                                  [facility]   
Physician   
Group  
   
                                        Comment on above:   Performed By: #### C  
UU ####  
[address]   
   
                                                    Urine cultureOrdered By: Lianne Meza on 10-   
   
                                                    Bacteria identified Cx   
Nom (U)         Urine culture                                   [facility]  
   
                                                    URETHRITIS/DISCHARGE PLUS VA  
GINITIS (HTRX)on 2024   
   
                      ATOPOBIUM VAGINAE 30.202     Abnormal              [facility]  
   
                      ATOPOBIUM VAGINAE Detected   Abnormal              [facility]  
   
                                                    BVAB 2,3 (BACTERIAL   
VAGINOSIS ASSOCIATED   
BACTERIA 2, 3);   
MOBILUNCUS SPP  0.000                                           [facility]  
   
                                                    BVAB 2,3 (BACTERIAL   
VAGINOSIS ASSOCIATED   
BACTERIA 2, 3);   
MOBILUNCUS SPP  Not detected                                    [facility]  
   
                                                    SHELBIE ALBICANS,   
PARAPSILOSIS, TROPICALIS 0.000                                           [facility]  
   
                                                    SHELBIE ALBICANS,   
PARAPSILOSIS, TROPICALIS Not detected                                    [facility]  
   
                      SHELBIE GLABRATA 0.000                            [facility]  
   
                      SHELBIE GLABRATA Not detected                       [facility]  
   
                      SHELBIE KRUSEI 0.000                            [facility]  
   
                      SHELBIE KRUSEI Not detected                       [facility]  
   
                      CHLAMYDIA TRACHOMATIS 0.000                            [facility]  
   
                      CHLAMYDIA TRACHOMATIS Not detected                       [facility]  
[facility]  
   
                      GARDNERELLA VAGINALIS 0.000                            [facility]  
   
                      GARDNERELLA VAGINALIS Not detected                       [facility]  
[facility]  
   
                                                    Interpretation and   
review of laboratory   
results         Abnormal                                        [facility]  
   
                      MEGASPHAERA (TYPES 1, 2) 0.000                              
[facility]  
   
                      MEGASPHAERA (TYPES 1, 2) Not detected                       
  [facility]  
   
                      MYCOPLASMA GENITALIUM 0.000                            [facility]  
   
                      MYCOPLASMA GENITALIUM Not detected                       N  
[facility]  
   
                      NEISSERIA GONORRHOEAE 0.000                            [facility]  
   
                      NEISSERIA GONORRHOEAE Not detected                       N  
[facility]  
   
                      TRICHOMONAS VAGINALIS 0.000                            [facility]  
   
                      TRICHOMONAS VAGINALIS Not detected                       N  
[facility]  
   
                                                                  [facility]  
   
                                                    Laboratory - Microbiology an  
d Antimicrobial susceptibilityon 2024   
   
                                                    Bacterial vaginosis and   
vaginitis DNA panel   
Probe+sig amp (Vag fld) Positive                                        [facility]   
Healthcare  
   
                                                    No Panel Informationon    
   
                                                    Interpretation and   
review of laboratory   
results         Abnormal                                        [facility]  
   
                      Trichomonas, UA Negative                         [facility]  
   
                      Yeast      Positive                         [facility]  
   
                                                                  [facility]  
   
                                                    XR lumbar spine 2-3V*on    
   
                                        XR lumbar spine 2-3V* [facility] [place] [address]  
  
XRay Report  
Signed  
  
Patient: Kathi Self   
MR#: [id number]  
  
: 1970   
Acct:[id number]  
  
Age/Sex: 53 / F ADM   
Date: 24  
  
Loc: [facility] Room:   
Type: [facility]  
Attending Dr: Brittney Meza MD  
Copies to: Brittney Meza MD  
  
  
  
Ordering Provider:   
Brittney Meza MD  
Date of Service:   
24  
Accession #:   
([id number]) XR/XR   
lumbar spine 2-3V*:   
M53.3 -   
Sacrococcygeal   
disorders, not   
elsewhere  
classified  
  
  
  
  
2 views Lumbar Spine  
  
HISTORY: Back pain  
  
COMPARISON: None  
  
POSTSURGICAL CHANGES:   
None  
  
  
BONY ALIGNMENT: Mild   
lateral curvature  
HYPERMOBILITY:No   
bending imaging.  
LISTHESIS:Mild L5-S1   
anterolisthesis   
secondary to L5 pars   
defects  
  
FRACTURE: None  
  
DEGENERATIVE CHANGES:   
Moderate L5-S1   
spondylosis. Lower   
lumbar facet   
degeneration.   
Moderate  
bilateral SI joint   
degenerative changes.  
  
SOFT TISSUES:   
Unremarkable  
  
BONY   
MINERALIZATION:[place]  
te  
  
  
ORDER #: [id number]   
XR/XR lumbar spine   
2-3V*  
IMPRESSION: Mild   
L5-S1 spondylosis   
with mild   
anterolisthesis and   
L5 pars defects.  
  
Impression dictated   
by: Zach Le M.D.2024 2:59 PM  
  
  
Dictation Location:   
[place] [address]  
  
  
  
Transcribed By: [facility]   
24 7017  
Dictated By:   
Zach Le DO   
24 1447  
  
Signed By:  
24 145       Normal                                  [facility]   
Physician   
Group  
   
                                                    CT CARDIAC SCORINGon   
023   
   
                                        CT CARDIAC SCORING  Addendum Begins  
MRN: [id number]  
Patient Name: KATHI SELF  
  
ADDENDUM:  
Technical: The   
following is to serve   
as an over-read for   
an  
unenhanced cardiac   
CT, to evaluate the   
extra vascular   
structures.  
  
Contiguous unenhanced   
CT sections are   
performed from the   
level of the  
aidan to the upper   
abdomen.  
  
  
  
Findings: The   
visualized portions   
of both lungs are   
clear aside from  
linear scarring or   
atelectasis in the   
left lung base and   
tiny areas  
of pleural thickening   
or scarring in the   
left mid and lower   
thorax..  
  
There is no sign of   
pathologic lymph node   
enlargement. There is   
no  
pericardial or   
pleural effusion.  
  
Images through the   
upper abdomen are   
unremarkable.  
  
The visualized   
osseous and soft   
tissue structures of   
the chest wall  
are intact.  
  
  
  
Impression: The extra   
vascular structures   
have an unremarkable   
CT  
appearance.  
Electronically signed   
by: JAMES WOLF MD   
Addendum Ends  
MRN: [id number]  
Patient Name: KATHI SELF  
  
STUDY:  
CT CARDIAC SCORING;   
2023 11:07 am  
  
INDICATION:  
chest pain Shortness   
of breath on   
exertion.  
  
COMPARISON:  
None.  
  
ACCESSION NUMBER(S):  
[id number]  
  
ORDERING CLINICIAN:  
ROQUE SPEARS  
  
TECHNIQUE:  
Using prospective ECG   
gating, CT scan of   
the coronary arteries   
was  
performed without   
intravenous contrast.   
Coronary calcium   
scoring was  
performed according   
to the method of   
Agatston.  
  
CT Dose-Length   
Product (DLP): 70   
mGy*cm  
CT Dose Reduction   
Employed: Yes,   
prospective gating,   
iterative  
reconstruction.  
  
FINDINGS:  
The score and   
distribution of   
calcium in the   
coronary arteries is   
as  
follows:  
  
LM 0  
LAD 20  
LCx 0  
RCA 0  
  
Total 20  
  
The visualized   
ascending thoracic   
aorta measures 2.8 cm   
in diameter.  
The heart is normal   
in size. No   
pericardial effusion   
is present.  
  
  
The main pulmonary   
artery, right and   
left pulmonary artery   
are normal  
in size.  
  
  
IMPRESSION:  
1. Coronary artery   
calcium score of 20*.  
2. SCHNEIDER 89th   
percentile** for age,   
gender, and race in   
asymptomatic  
patients.  
  
  
*Coronary Artery   
Agatston score  
  
Score risk  
Very low 1-99  
Mildly increased   
100-299  
Moderately increased   
>300  
Moderate to severely   
increased >800  
  
Pat et al. JCCT   
2016   
(http://dx.doi.org/10  
.1016/j.jcct.2016.11.  
003)  
  
** SCHNEIDER Percentile  
In general, greater   
than 75th percentile   
for age, gender, and   
race is  
considered to be a   
higher relative risk   
and higher lifetime   
risk  
condition.  
Greater than 75th   
percentile=moderate   
to severely increased   
relative  
risk irrespective of   
the score. Advise   
using SCHNEIDER 10 year   
CHD risk  
calculator below for   
better discrimination   
of risk.  
  
SCHNEIDER 10-Year CHD Risk   
with Coronary Artery   
Calcification can be  
calcuate using link   
below  
https://www.schneider-nhlb  
i.org/MESACHDRisk/Mes  
aRiskScore/RiskScore.  
aspx  
Harvinder et al.   
JACC    
(http://dx.doi.org/10  
.1016/j.j  
acc.2015.08.035)  
  
Reading Cardiologist:   
Dr. Myke Plascencia,   
Date: 2023 12:36   
pm  
Electronically signed   
by: JAMES WOLF MD     Normal                                  [facility]  
   
                                                    CT Cardiac Scoringon   
023   
   
                      CT Cardiac Scoring            Normal                [facility]   
[facility]   
600 DO  
Work Phone:   
[phone]  
0  
   
                                                    Office Visit (Cardiology)on   
2023   
   
                                        Follow-up visit     Diagnoses/Problems  
Assessed  
Chest pain (786.50)   
(R07.9)  
Hyperlipidemia   
(272.4) (E78.5)  
Premature ventricular   
contraction (427.69)   
(I49.3)  
Shortness of breath   
on exertion (786.05)   
(R06.02)  
Former smoker   
(V15.82) (Z87.891)  
quit 2019  
Body mass index (BMI)   
of 22.0 to 22.9 in   
adult (V85.1)   
(Z68.22)  
Orders  
Chest pain, Shortness   
of breath on exertion  
CT Cardiac Scoring;   
Status:Hold For -   
Scheduling; Requested   
for:2023;  
Patient taking   
Metformin or   
Derivatives? : No  
Radiologist to   
Determine Optimal   
Study : Y  
What are the   
patient's signs and   
symptoms? : chest   
pain  
SocHx: Former smoker  
Tobacco Use   
Screening;   
Status:Complete;   
Done: [id number]  
Patient Instructions  
Please bring all   
medicines, vitamins,   
and herbal   
supplements with you   
when you come to the   
office.  
Prescriptions will   
not be filled unless   
you are compliant   
with your follow up   
appointments or have   
a follow up   
appointment scheduled   
as per instruction of   
your physician.   
Refills should be   
requested at the time   
of your visit.  
Calcium Score  
Follow up in 9 months  
  
The provider reviewed   
the following test(s)   
and result(s) with   
the patient:   
Myocardial perfusion   
study  
  
Chief Complaint  
KATHI SELF is being   
seen for a 2 month   
follow-up of.  
  
History of Present   
Illness  
Patient is here for   
follow-up continue   
management for   
previous evaluation   
for atypical chest   
pain. Has been   
following the patient   
since  for   
intermittent atypical   
chest pain. She   
underwent several   
cardiac evaluation   
with echocardiogram   
and stress test all   
of it appears to be   
reassuring. Her chest   
pain and   
nonexertional. She   
does describe some   
radiation to the left   
arm. There is no   
clear associated,   
precipitated or   
alleviated symptoms.   
She did undergo   
resection of lung   
bleb back in  by   
Dr. Patel. She also   
had been on Prilosec   
for some dyspepsia.   
She was referred for   
a stress test where   
she was able to   
exercise for 10   
minutes. Did not have   
any EKG changes. And   
was clinically   
negative test.   
ASSESSMENT:  
1. Atypical chest   
pain appears   
musculoskeletal or   
pleuritic the patient   
had a remote history   
of resection of lung   
bleb. Recent stress   
test is reassuring.   
Patient continues to   
complain of   
intermittent episodes   
of chest pain.   
Following last office   
visit I did recommend   
trial of   
beta-blockers but the   
patient did not want   
to do that  
2. reformed smoker  
3. hyperlipidemia.   
Does not meet   
criteria for   
treatment recent lab   
work noted and   
reviewed with her  
4. minor shortness   
breath related to   
prior tobacco use   
functional class I   
with excellent   
exercise tolerance  
5. Documentation of   
few premature   
ventricular   
contractions, felt to   
be benign. In the   
past with no   
recurrence  
RECOMMENDATION:  
1. Reviewed the   
results of her GXT   
and lab work and   
reassured her cardiac  
2. I recommended   
calcium scoring for   
risk assessment   
considering her   
continued complaint   
and concern about   
underlying ischemic   
heart disease  
3. I discussed with   
her second opinion   
and or invasive   
evaluation but the   
patient declined  
4. I advised her to   
notify me with   
changes of cardiac   
status or symptoms I   
will see her back in   
9 months  
  
Surgical History  
Problems  
History of Complete   
colonoscopy  
History of Lung   
surgery  
History of   
Tonsillectomy with   
adenoidectomy  
Current Meds  
  
Medication   
NameInstruction  
Acetaminophen-Codeine   
#3 300-30 MG Oral   
TabletTAKE 1 TABLET   
BY MOUTH THREE TIMES   
A DAY AS NEEDED  
ALPRAZolam 0.25 MG   
Oral TabletTAKE   
TABLET PRN  
Cyclobenzaprine HCl -   
10 MG Oral TabletTAKE   
TABLET PRN  
Omeprazole 20 MG Oral   
Capsule Delayed   
ReleaseTAKE 1 CAPSULE   
Daily  
Allergies  
Medication  
No Known Drug   
Allergies  
Recorded By: Melody Kaminski; 2022   
3:53:25 PM  
Social History  
Problems  
Caffeine use (V49.89)   
(Z78.9)  
2 cups coffee daily,   
couple sodas daily  
Consumes alcohol   
(V49.89) (Z78.9)  
social  
Former smoker   
(V15.82) (Z87.891)  
quit 2019  
No illicit drug use  
Review of Systems  
Constitutional: not   
feeling tired.  
Cardiovascular:   
palpitations, but no   
intermittent leg   
claudication and as   
noted in HPI.  
Respiratory: no cough   
and no shortness of   
breath.  
Gastrointestinal: no   
change in bowel   
habits and no blood   
in stools.  
Integumentary: no   
skin rashes.  
Neurological: no   
seizures and no   
frequent falls.  
All other systems   
have been reviewed   
and are negative for   
complaint.  
  
Vitals  
Vital Signs  
Recorded: 2023   
03:46PM  
Heart Rate88, L   
Radial  
[id number], LUE,   
Sitting  
Etdlmssjg48, LUE,   
Sitting  
Height5 ft 4 in  
Rxfvfd416 lb  
BMI Xlvxrphfqb34.66   
kg/m2  
BSA Calculated1.64  
Tobacco Useb) No  
PHQ-2 #1. Over the   
last 2 weeks have you   
felt down, depressed   
or hopeless? (If yes,   
answer PHQ-9 below)No  
PHQ-2 #2. Over the   
last 2 weeks have you   
felt little interest   
or pleasure in doing   
things? (If yes,   
answer PHQ-9 below)No  
Falls Screening (Age   
18+)c) Not medically   
indicated  
Physical Exam  
Constitutional: alert   
and in no acute   
distress.  
Neck: neck is supple,   
symmetric, trachea   
midline, no masses   
and no thyromegaly .  
Pulmo (more content   
not included)...    Normal                                   [facility]  
   
                                                    Tobacco Screening.on   
023   
   
                                                    Adult depression   
screening assessment No                                              [facility]   
[facility]  
y [address] DO  
Work Phone:   
[phone]  
0  
   
                                        Fall risk assessment c) Not medically   
indicated                                                   [facility]   
[facility]  
y [address] DO  
Work Phone:   
[phone]  
0  
   
                      Tobacco use status [facility] b) No                            M  
[facility]  
y [address] DO  
Work Phone:   
[phone]  
0  
   
                                                    SCREENING MAMMOGRAM W/KANU,   
BILATERAL*on 2022   
   
                                                    SCREENING MAMMOGRAM   
W/KANU, BILATERAL*                      CLINICAL HISTORY:   
Screening Mammogram  
COMPARISON:   
2021,   
2020, and   
10/3/2019.  
TECHNIQUE: 2D and 3D   
Tomosynthesis of the   
right and left   
breasts was   
performed.  
  
FINDINGS:  
DENSITY:   
Heterogeneously   
dense.  
There are no   
suspicious masses,   
areas of suspicious   
microcalcifications   
or areas  
of architectural   
distortion   
identified. No   
evidence of skin   
thickening.  
  
IMPRESSION:  
BIRADS 1 : NEGATIVE,   
NORMAL INTERVAL   
FOLLOW UP.  
  
  
Board certified   
radiologist.   
Accredited by the ACR   
and FDA.  
  
MAMMOGRAPHY IS VERY   
IMPORTANT TO YOUR   
HEALTH.  
  
CURRENT AMERICAN   
COLLEGE OF RADIOLOGY   
AND NATIONAL   
COMPREHENSIVE CANCER   
NETWORK GUIDELINES  
RECOMMENDS ANNUAL   
MAMMOGRAPHY BEGINNING   
AT AGE 40.  
  
THIS FACILITY USUALLY   
USES A REMINDER   
SYSTEM TO ENSURE ALL   
POSITIONS RECEIVED   
REMINDER  
NOTIFICATIONS AT THE   
TIME BASED ON THE   
RECOMMENDATIONS OF   
THIS EXAM.  
  
  
  
Report reported and   
signed by Siva Ashraf on 2022   
0944                Normal                                  [facility]   
Medical   
Specialist  
   
                                                    Cardiac Stress Teston 2022   
   
                                        Cardiac Stress Test [address],   
[address], [place] [address]  
Tel [phone] Fax   
[phone]  
Exercise Stress Test  
Patient Name: KATHI SELF Ordering   
Physician: [id number]   
Roque Spears  
Study Date:   
2022 Reading   
Physician: [id number]   
Victoria Pang MD,  
[facility]  
MRN/PID: [id number]   
Supervising   
Physician: [id number]   
Sathish Hutchison MD  
Accession/Order#:   
[id number] Referring   
Physician: ROQUE SPEARS  
YOB: 1970 PCP: Brittney Meza  
Gender: F Fellow:  
Height: 162.56 cm   
Nurse: Uma Cedeno RN  
Weight: 63.05 kg   
Sonographer: NA  
BSA: 1.68 m2   
Technologist:  
BMI: 23.86 Additional   
Staff:  
kg/m2  
Age: 52 years cc   
report to:  
Patient Location:    
report to: [id number]   
Roque Spears  
Study Type: Cardiac   
Stress Test  
Diagnosis/ICD:   
R07.9-Chest pain,   
unspecified;   
R06.02-Shortness of   
breath  
Indication: Chest   
Pain  
Procedure/CPT: Stress   
Test   
Interpretation-65686;   
Stress Test   
Supervision-[id number]  
Falls Risk: Low:   
Patient has low risk   
for sustaining a   
fall; environmental   
safety interventions   
in place.  
Study Details:   
Correct procedure and   
correct patient   
verified verbally.  
Patient History:  
Allergies: None.  
Patient Performance:   
The peak heart rate   
achieved was 171 bpm,   
which was 103 % of   
the age predicted   
target heart rate of   
167 bpm. The resting   
blood pressure was   
126/86 mmHg with a   
heart rate of 80 bpm.   
The standing blood   
pressure was 124/86   
mmHg with a heart   
rate of 77 bpm. The   
patient's functional   
capacity was above   
average. The symptoms   
resolved with rest.   
The blood pressure   
response was normal.   
The test was   
terminated due to:   
fatigue.  
Baseline ECG: Resting   
ECG showed normal   
sinus rhythm.  
Stress Stage Data:  
+-----------------+--  
-+------+-------+  
   HR  Sys BP Contreras   
BP   
+-----------------+--  
-+------+-------+  
 Baseline Resting  80   
 126  86    
+-----------------+--  
-+------+-------+  
 Baseline Standing 77   
 124  86    
+-----------------+--  
-+------+-------+  
 Stage I  136 144  82   
   
+-----------------+--  
-+------+-------+  
 Stage II  136 158   
 88    
+-----------------+--  
-+------+-------+  
 Stage III  129 172   
 88    
+-----------------+--  
-+------+-------+  
 Stage IV  171 178   
 90    
+-----------------+--  
-+------+-------+  
Recovery ECG: The   
heart rate recovery   
was normal.  
+------------+---+---  
---+-------+  
   HR  Sys BP Contreras   
BP   
+------------+---+---  
---+-------+  
 Recovery I  130 178   
 90    
+------------+---+---  
---+-------+  
 Recovery II  153 168   
 86    
+------------+---+---  
---+-------+  
 Recovery  148   
 82    
+------------+---+---  
---+-------+  
 Recovery IV  93  138   
 80    
+------------+---+---  
---+-------+  
 Recovery V  93  122   
 78    
+------------+---+---  
---+-------+  
Stress Echo: There   
are no stress induced   
regional wall motion   
abnormalities.  
Summary:  
1. 1_normal exercise   
tolerance test after   
completing 10 minutes   
on a José Miguel protocol   
and achieving 102% of   
predicted maximal   
heart rate and a   
maximum workload of   
11.7 METS.  
2_no chest pain,   
cardiac arrhythmias   
or ischemic EKG   
changes noted with   
exercise, patient has   
good exercise   
tolerance for age   
with normal heart   
recovery and   
achievement of Blanchard   
treadmill score of   
10+ which is   
favorable.  
[id number] Victoria Pang MD, FACC  
Electronically signed   
on 2022 at   
6:13:21 PM  
*** Final ***       Normal                                  [facility]  
   
                      Cardiac Stress Test                                  MP-No  
[facility]   
[facility]   
600 DO  
Work Phone:   
[phone]  
   
                                                    LIPID PANEL, STANDARDon 11-   
   
                      Cholesterol [Mass/Vol] 225 mg/dL  High       <200       [facility]  
[facility]   
[facility]  
   
                                        Comment on above:   Order Comment: FASTI  
NG:YES  
FASTING: YES   
   
                                                            Performed By: #### 7  
600 ####  
[facility] [address], [address] [place] [address]  
Medical Director: Enrique Alcantar MD   
   
                                                    Cholesterol in HDL   
[Mass/Vol]      71 mg/dL        Normal          > OR = 50       [facility]  
   
                                        Comment on above:   Order Comment: FASTI  
NG:YES  
FASTING: YES   
   
                                                            Performed By: #### 7  
600 ####  
[facility] [address], [address] [place] [address]  
Medical Director: Enrique Alcantar MD   
   
                                                    Cholesterol in LDL   
[Mass/Vol]      136 mg/dL       High                            [facility]  
   
                                        Comment on above:   Order Comment: FASTI  
NG:YES  
FASTING: YES   
   
                                                            Result Comment: Refe  
rence range: <100  
Desirable range <100 mg/dL for primary prevention;  
<70 mg/dL for patients with CHD or diabetic patients  
with > or = 2 CHD risk factors.  
LDL-C is now calculated using the Lee  
calculation, which is a validated novel method providing  
better accuracy than the Friedewald equation in the  
estimation of LDL-C.  
Santy SHOEMAKER et al. ECTOR. 2013;310(19): 8486-4028  
(http://education.[employer]/faq/ESN469)   
   
                                                            Performed By: #### 7  
600 ####  
[facility] [address], [address] [place] [address]  
Medical Director: Enrique Alcantar MD   
   
                                                    Cholesterol.total/Choles  
terol in HDL [Mass   
ratio]          3.2 {ratio}     Normal          <5.0            [facility]  
   
                                        Comment on above:   Order Comment: FASTI  
NG:YES  
FASTING: YES   
   
                                                            Performed By: #### 7  
600 ####  
[facility] [address], [address] [place] [address]  
Medical Director: Enrique Alcantar MD   
   
                      NON HDL CHOLESTEROL 154 mg/dL (calc) High       <130        
 [facility]  
   
                                        Comment on above:   Order Comment: FASTI  
NG:YES  
FASTING: YES   
   
                                                            Result Comment: For   
patients with diabetes plus 1 major ASCVD   
risk  
factor, treating to a non-HDL-C goal of <100 mg/dL  
(LDL-C of <70 mg/dL) is considered a therapeutic  
option.   
   
                                                            Performed By: #### 7  
600 ####  
[facility] [place] [address], [address] [place] [address]  
Medical Director: Enrique Alcantar MD   
   
                      Triglyceride [Mass/Vol] 82 mg/dL   Normal     <150       [facility]  
[facility]  
   
                                        Comment on above:   Order Comment: FASTI  
NG:YES  
FASTING: YES   
   
                                                            Performed By: #### 7  
600 ####  
[facility]  
[address], [address] [place] [address]  
Medical Director: Enrique Alcantar MD   
   
                                                    Radiologyon 2022   
   
                      XR Chest 2 Views            Normal                -[facility]   
[facility]   
600 DO  
Work Phone:   
[phone]  
   
                                                    Office Visit (Cardiology)on   
11-   
   
                                        Follow-up visit     Diagnoses/Problems  
Assessed  
Chest pain (786.50)   
(R07.9)  
Former smoker   
(V15.82) (Z87.891)  
quit 2019  
Hyperlipidemia   
(272.4) (E78.5)  
Premature ventricular   
contraction (427.69)   
(I49.3)  
Shortness of breath   
on exertion (786.05)   
(R06.02)  
Body mass index (BMI)   
of 23.0 to 23.9 in   
adult (V85.1)   
(Z68.23)  
Orders  
Chest pain,   
Hyperlipidemia,   
Shortness of breath   
on exertion  
Lipid Panel;   
Status:Active -   
Retrospective   
Authorization;   
Requested   
for:2022;  
Chest pain, Shortness   
of breath on exertion  
Cardiac Stress Test;   
Status:Hold For -   
Scheduling,Retrospect  
dixon Authorization;  
Requested   
for:2022;  
IO EKG   
Electrocardiogram- 12   
Lead;   
Status:Complete;   
Done: 2022  
Shortness of breath   
on exertion  
Xray Chest 2 View PA   
+ Lateral;   
Status:Hold For -   
Scheduling,Retrospect  
dixon  
Authorization;   
Requested   
for:2022;  
Radiologist to   
Determine Optimal   
Study : Y  
What are the   
patient's signs and   
symptoms? : dyspnea  
SocHx: Former smoker  
Tobacco Use   
Screening;   
Status:Complete;   
Done: 2022  
Patient Instructions  
Please bring all   
medicines, vitamins,   
and herbal   
supplements with you   
when you come to the   
office.  
Prescriptions will   
not be filled unless   
you are compliant   
with your follow up   
appointments or have   
a follow up   
appointment scheduled   
as per instruction of   
your physician.   
Refills should be   
requested at the time   
of your visit.  
GXT  
Chest x ray  
Lipid  
Follow up in 2 months  
  
The provider reviewed   
the following test(s)   
and result(s) with   
the patient: ECG  
  
Chief Complaint  
KATHI SELF is being   
seen for a   
cardiovascular   
evaluation.  
KATHI SELF is being   
seen for chest pain.  
  
History of Present   
Illness  
Patient is here for   
cardiovascular   
evaluation for   
symptoms of chest   
pain I documented   
that were similar   
presentation.   
Previous medical   
work-up included a   
stress test and   
echocardiogram which   
was negative. Her   
symptoms felt to be   
either GI in nature   
or musculoskeletal.   
The patient had a   
previous history of   
resection of the lung   
Bleb  
By Dr. Simons .   
She report recent   
increase in frequency   
of her chest pain.   
She will few days ago   
she had an episode   
lasted almost the   
whole day. That when   
she put her hand on   
her chest and pressed   
against the area. She   
intermittent   
shortness of breath.   
She is described   
functional class I.   
There is no clear   
associated,   
precipitating or   
alleviating factors.  
ASSESSMENT:  
1. Atypical chest   
pain appears   
musculoskeletal or   
pleuritic  
2. reformed smoker  
3. hyperlipidemia.   
Does not meet   
criteria for   
treatment  
4. minor shortness   
breath related to   
prior tobacco use  
5. Documentation of   
few premature   
ventricular   
contractions, felt to   
be benign. In the   
past with no   
recurrence  
RECOMMENDATION:  
1. The patient was   
advised to proceed   
with GXT.  
2. I advised her to   
have a chest x-ray   
and lipid profile  
3. I recommended a   
trial of metoprolol  
4. I advised her to   
notify me with   
changes of cardiac   
status or symptoms I   
will see her back in   
2-3  
Follow-up  
  
Active Problems  
Problems  
Chest pain (786.50)   
(R07.9)  
Former smoker   
(V15.82) (Z87.891)  
quit 2019  
Hyperlipidemia   
(272.4) (E78.5)  
Premature ventricular   
contraction (427.69)   
(I49.3)  
Shortness of breath   
on exertion (786.05)   
(R06.02)  
Surgical History  
Problems  
History of Complete   
colonoscopy  
History of Lung   
surgery  
History of   
Tonsillectomy with   
adenoidectomy  
Current Meds  
  
Medication   
NameInstruction  
Acetaminophen-Codeine   
#3 300-30 MG Oral   
TabletTAKE 1 TABLET   
BY MOUTH THREE TIMES   
A DAY AS NEEDED  
ALPRAZolam 0.25 MG   
Oral TabletTAKE   
TABLET PRN  
Cyclobenzaprine HCl -   
10 MG Oral TabletTAKE   
TABLET PRN  
Omeprazole 20 MG Oral   
Capsule Delayed   
ReleaseTAKE 1 CAPSULE   
Daily  
Allergies  
Medication  
No Known Drug   
Allergies  
Recorded By: Melody Kaminski; 2022   
3:53:25 PM  
Social History  
Problems  
Caffeine use (V49.89)   
(Z78.9)  
2 cups coffee daily,   
couple sodas daily  
Consumes alcohol   
(V49.89) (Z78.9)  
social  
Former smoker   
(V15.82) (Z87.891)  
quit 2019  
No illicit drug use  
Review of Systems  
Constitutional: not   
feeling tired.  
Eyes: no eyesight   
problems.  
ENT: no hearing loss   
and no nosebleeds.  
Cardiovascular: chest   
pain and   
palpitations, but no   
intermittent leg   
claudication and as   
noted in HPI.  
Respiratory:   
shortness of breath,   
but no chronic cough.  
Gastrointestinal: no   
change in bowel   
habits and no blood   
in stools.  
Genitourinary: no   
urinary frequency.  
Skin: no skin rashes.  
Neurological:   
dizziness, but no   
seizures and no   
frequent falls.  
Psychiatric: no   
depression and not   
suicidal.  
All other systems   
have been reviewed   
and are negative for   
complaint.  
  
Vitals  
Vital Signs  
Recorded: 2022   
01:31PMRecorded:   
2022 01:30PM  
Heart Rate81,   
Xbyuao22, Apical  
Ftyntawl427, LUE,   
Tgmjabv510, RUE,   
Sitting  
Bnvsofxwx59, LUE,   
Nxnxrpy77, RUE,   
Sitting  
Height5 ft 4 in5 ft 4   
in  
Glhxfm661 lb 139 lb  
BMI Qkylhkdgoj70.86   
kg/m223.86 kg/m2  
BSA   
Calculated1.681.68  
Tobacco Useb) No  
PHQ-2 #1. Over the   
last 2 weeks have you   
felt down, depre   
(more content not   
included)...        Normal                                   [facility]  
   
                                                    Tobacco Screening.on 11-10-2  
022   
   
                                                    Adult depression   
screening assessment No                                              [facility]   
[facility]   
600 DO  
Work Phone:   
[phone]  
0  
   
                      Tobacco use status CPHS b) No                            M  
[facility]   
600 DO  
Work Phone:   
[phone]  
0  
   
                                                    ASYMPTOMATIC COVID-19 ANTIGE  
Non 10-   
   
                      EUA Statement SEE BELOW  Normal                [facility]  
   
                                        Comment on above:   Result Comment: This  
 test has not been FDA cleared or   
approved, but has been authorized by the FDA under an   
Emergency Use Authorization (EUA) for use by authorized   
laboratories certified under CLIA that meet the requirements   
to perform moderate or high complexity testing. This test has   
been authorized only for the detection of proteins from   
SARS-CoV-2, not for any other viruses or pathogens. The   
emergency use of this test is authorized for the duration of   
the declaration that circumstances exist justifying the   
authorization of emergency use of in vitro diagnostic tests   
for detection and/or diagnosis of Covid-19 under section   
564(b)(1) of the Act, 21 U.S.C. 360bbb-3(b)(1), unless the   
declaration is terminated or authorization is revoked sooner.   
   
                                                            Performed By: #### C  
VDAGA ####  
[facility]  
[address] [place] [address]  
Dr. Darci Luo   
   
                                                    SARS-CoV-2 (COVID-19)   
RNA SVITLANA+probe Ql (Unsp   
spec)                     Positive                  Critically   
abnormal                  NEGATIVE                  [facility]  
   
                                        Comment on above:   Result Comment: SARS  
-CoV-2 antigen present; does not rule out   
coinfection with other pathogens.   
   
                                                            Performed By: #### C  
VDAGA ####  
[facility]  
[address] [place] [address]  
Dr. Darci Luo   
   
                                                    [facility]  
6-2022   
   
                                                    SARS-CoV-2 (COVID-19)   
RNA SVITLANA+probe Ql (Unsp   
spec)                     Detected                  Critically   
abnormal                                NOT   
DETECTED                                [facility]  
   
                                        Comment on above:   Result Comment: This  
 test is not yet approved or cleared by   
the [place] FDA. When there are no FDA-approved or   
cleared tests available, and other criteria are met, FDA can   
make tests available under an emergency access mechanism   
called an Emergency Use Authorization (EUA). The EUA for this   
test is supported by the  of Health and Human   
Service's declaration that circumstances exist to justify the   
emergency use of in vitro diagnostics for the detection   
and/or diagnosis of the virus that causes COVID-19. This EUA   
will remain in effect for the duration of the COVID-19   
declaration justifying emergency of IVDs, unless it is   
terminated or revoked by the FDA (after which the test may no   
longer be used).   
   
                                                            Performed By: #### C  
VDTBH ####  
[facility]  
[address] [place] [address]  
Dr. Darci Luo   
   
                                                    Coding Summary.on 2020  
   
   
                                        Coding Summary.     CODING DATE:   
2020 [facility] STATUS:  
Home (Routine DC)  
PAYOR:  
Self Pay  
APC DESCRIPTION  
5571 Level 1 Imaging   
with Contrast  
5693 Level 3 Drug   
Administration  
5025 Level 5 Type A   
ED Visits  
ADMIT DX:  
REASON FOR VISIT DX:  
R07.9 Chest pain,   
unspecified  
M54.9 Dorsalgia,   
unspecified  
R11.0 Nausea  
FINAL DX:  
PRINCIPAL:  
R09.81 Nasal   
congestion  
SECONDARY:  
M54.9 Dorsalgia,   
unspecified  
PYMT  
PROC APC STAT   
DESCRIPTION DOCTOR   
NAME DATE  
NOTE: The code number   
assigned matches the   
documented diagnosis   
and / or  
procedure in the   
patient's chart.   
However, the   
narrative phrase   
printed from  
the coding software   
may appear   
abbreviated, or   
result in slightly   
different  
terminology.  
Revised Coded By:   
Karlie Alex  
Revised Date Saved:   
2020 04:36 pm Normal                                  [facility]  
   
                                                    Auto Diffon 2020   
   
                      Basophils/100 WBC (Bld) 0.8 %      Normal     0.0-2.0    [facility]  
   
                                        Comment on above:   Order Comment: Order  
 Added by Discern Expert.   
   
                                                            Performed By: #### 2  
262000, 1162037, 6353616, 9827799,   
0600742, 47475760, 12399990, 88631977, 56470947, 7334399 ####  
[facility]  
[address] [place] [address]   
   
                                                    Basophils/Leukocytes   
Auto (Bld) [Pure #   
fraction]       0.1 E9/L        Normal          0.0-0.2         [facility]  
   
                                        Comment on above:   Order Comment: Order  
 Added by Discern Expert.   
   
                                                            Performed By: #### 2  
877780, 2280395, 5284344, 5489278,   
1369704, 98350731, 92831580, 93541568, 29581882, 8160574 ####  
[facility]  
[address] [place] [address]   
   
                                                    Eosinophils/100 WBC   
(Bld)           1.1 %           Normal          0.0-8.0         [facility]  
   
                                        Comment on above:   Order Comment: Order  
 Added by Discern Expert.   
   
                                                            Performed By: #### 2  
147204, 1892675, 1803067, 1678452,   
9013226, 14139286, 53843903, 01649146, 60731085, 8533283 ####  
[facility]  
[address] [place] [address]   
   
                                                    Eosinophils/Leukocytes   
Auto (Bld) [Pure #   
fraction]       0.1 E9/L        Normal          0.0-0.5         [facility]  
   
                                        Comment on above:   Order Comment: Order  
 Added by Discern Expert.   
   
                                                            Performed By: #### 2  
961473, 4106143, 5336965, 8406194,   
0479467, 53918050, 35663719, 13172733, 96512205, 0039817 ####  
[facility]  
[address] [place] [address]   
   
                                                    Lymphocytes/100 WBC   
(Bld)           29.5 %          Normal          14.0-50.0       [facility]  
   
                                        Comment on above:   Order Comment: Order  
 Added by Discern Expert.   
   
                                                            Performed By: #### 2  
744426, 7574562, 6835312, 8331592,   
1184371, 82415094, 44327557, 35781833, 54943321, 3364375 ####  
[facility]  
[address] [place] [address]   
   
                                                    Lymphocytes/Leukocytes   
Auto (Bld) [Pure #   
fraction]       2.1 E9/L        Normal          1.0-4.0         [facility]  
   
                                        Comment on above:   Order Comment: Order  
 Added by Discern Expert.   
   
                                                            Performed By: #### 2  
761320, 2436031, 2581396, 7237367,   
0075680, 93248563, 85728415, 75735698, 60782573, 8116579 ####  
[facility]  
[address] [place] [address]   
   
                      Monocytes/100 WBC (Bld) 10.5 %     Normal     4.0-14.0   [facility]  
   
                                        Comment on above:   Order Comment: Order  
 Added by Discern Expert.   
   
                                                            Performed By: #### 2  
896819, 6184443, 5283354, 5370607,   
6621305, 34959949, 48106273, 27343128, 16240172, 9382659 ####  
[facility]  
[address] [place] [address]   
   
                                                    Monocytes/Leukocytes   
Auto (Bld) [Pure #   
fraction]       0.7 E9/L        Normal          0.2-1.0         [facility]  
   
                                        Comment on above:   Order Comment: Order  
 Added by Discern Expert.   
   
                                                            Performed By: #### 2  
676356, 3852236, 4615874, 4969205,   
4905303, 87356648, 64728630, 83291317, 13104681, 3745717 ####  
[facility]  
[address] [place] [address]   
   
                                                    Neutrophils/100 WBC   
(Bld)           58.1 %          Normal          36.0-75.0       [facility]  
   
                                        Comment on above:   Order Comment: Order  
 Added by Discern Expert.   
   
                                                            Performed By: #### 2  
264863, 4437170, 2036361, 2597418,   
2247217, 51343194, 55834338, 12248506, 06484224, 4172685 ####  
[facility]  
[address] [place] [address]   
   
                                                    Neutrophils/Leukocytes   
Auto (Bld) [Pure #   
fraction]       4.1 E9/L        Normal          2.0-7.5         [facility]  
   
                                        Comment on above:   Order Comment: Order  
 Added by Discern Expert.   
   
                                                            Performed By: #### 2  
453555, 0027034, 2951253, 9260342,   
7404660, 92049812, 58536023, 31500520, 28729483, 8205682 ####  
[facility]  
[address] [place] [address]   
   
                                                    BMPon 2020   
   
                      Creatinine [Mass/Vol] 0.9 mg/dL  Normal     0.5-1.3    [facility]  
   
                                        Comment on above:   Performed By: #### 2  
747253, 7065408, 1201218, 6930695,   
7613142, 60693985, 79645667, 64867044, 33021213, 1573174 ####  
[facility]  
[address] [place] [address]   
   
                      Urea nitrogen [Mass/Vol] 14 mg/dL   Normal     -         
[facility]  
   
                                        Comment on above:   Performed By: #### 2  
966458, 4865039, 7031148, 2747756,   
9365615, 51718041, 81167997, 88144365, 39202537, 3895007 ####  
[facility]  
[address] [place] [address]   
   
                                                    Urea nitrogen/Creatinine   
[Mass ratio]    16 No Units     Normal          10-20           [facility]  
   
                                        Comment on above:   Performed By: #### 2  
646509, 3036239, 0677594, 3110348,   
6736125, 08979937, 40295724, 85896173, 68354724, 2371329 ####  
[facility]  
[address] [place] [address]   
   
                      Anion gap [Moles/Vol] 13 mmol/L  Normal     6-16       [facility]  
   
                                        Comment on above:   Performed By: #### 2  
239815, 0555550, 0773522, 0538891,   
6559200, 82600268, 53866882, 14827548, 65369217, 9161437 ####  
[facility]  
[address] [place] [address]   
   
                      Calcium [Mass/Vol] 8.8 mg/dL  Low        8.9-11.1   [facility]  
   
                                        Comment on above:   Performed By: #### 2  
428924, 2709118, 2914630, 2637615,   
9630964, 11226836, 61141308, 60721837, 22457436, 5198431 ####  
[facility]  
[address] [place] [address]   
   
                      Chloride [Moles/Vol] 102 mmol/L Normal     101-111    [facility]  
   
                                        Comment on above:   Performed By: #### 2  
545289, 3456009, 7629985, 5876119,   
5172321, 37425783, 48022539, 55801419, 14008485, 8436981 ####  
[facility]  
[address] [place] [address]   
   
                      CO2 [Moles/Vol] 24 mmol/L  Normal     21-31      [facility]  
   
                                        Comment on above:   Performed By: #### 2  
137478, 5387215, 4366077, 4789811,   
1847167, 36437416, 32159862, 56706333, 57500106, 4550345 ####  
[facility]  
[address] [place] [address]   
   
                      Glucose [Mass/Vol] 106 mg/dL  Normal          [facility]  
   
                                        Comment on above:   Result Comment: If t  
his glucose result represents a fasting   
glucose, interpretation should refer to the following   
reference range: 55-99 mg/dL   
   
                                                            Performed By: #### 2  
669460, 8988148, 6443295, 5584343,   
6084542, 37778668, 89182960, 05015442, 58979530, 0484497 ####  
[facility]  
[address] [place] [address]   
   
                      Potassium [Moles/Vol] 4.0 mmol/L Normal     3.5-5.3    [facility]  
   
                                        Comment on above:   Performed By: #### 2  
422054, 5327732, 0452587, 0370809,   
2109915, 72342997, 60468994, 74042011, 17850689, 4860065 ####  
[facility]  
[address] [place] [address]   
   
                      Sodium [Moles/Vol] 135 mmol/L Normal     135-145    [facility]  
   
                                        Comment on above:   Performed By: #### 2  
948816, 0378764, 5078190, 5897342,   
6553585, 01274366, 27137135, 55117770, 22610198, 7950273 ####  
[facility]  
[address] [place] [address]   
   
                                                    CBC w/ Auto Diffon   
0   
   
                                                    Erythrocyte distribution   
width (RBC) [Ratio] 14.0 %          Normal          10.9-14.2       [facility]  
   
                                        Comment on above:   Performed By: #### 2  
788840, 8619738, 5047426, 2923620,   
5929044, 04192147, 05374959, 61021135, 76815687, 9791782 ####  
[facility]  
[address] [place] [address]   
   
                                                    Hematocrit (Bld) [Volume   
fraction]       36.5 %          Normal          34.0-46.0       [facility]  
   
                                        Comment on above:   Performed By: #### 2  
355800, 5685052, 0134257, 6609377,   
8589593, 35455944, 76095854, 50776015, 06164480, 8438212 ####  
[facility]  
[address] [place] [address]   
   
                                                    Hemoglobin (Bld)   
[Mass/Vol]      12.4 g/dL       Normal          12.0-16.0       [facility]  
   
                                        Comment on above:   Performed By: #### 2  
616398, 0291793, 6182693, 0159472,   
8148121, 94771300, 77136571, 39348273, 77997219, 3990094 ####  
[facility]  
[address] [place] [address]   
   
                      MCH (RBC) [Entitic mass] 30.3 pg    Normal     27.0-34.0    
[facility]  
   
                                        Comment on above:   Performed By: #### 2  
922330, 4738734, 6349792, 1498376,   
4194939, 86287432, 01821018, 19990686, 27426562, 5219960 ####  
[facility]  
[address] [place] [address]   
   
                      MCHC (RBC) [Mass/Vol] 34.0 g/dL  Normal     31.4-36.0  [facility]  
   
                                        Comment on above:   Performed By: #### 2  
770510, 6913179, 4879323, 1149665,   
5939799, 74232118, 87409129, 67644964, 45548462, 6541517 ####  
[facility]  
[address] [place] [address]   
   
                      MCV (RBC) [Entitic vol] 89.1 fL    Normal     80.0-100.0 F  
[facility]  
   
                                        Comment on above:   Performed By: #### 2  
687037, 2100362, 3710627, 9984626,   
0259335, 63414194, 40142098, 48657042, 67653827, 3095771 ####  
[facility]  
[address] [place] [address]   
   
                                                    Platelet mean volume   
(Bld) [Entitic vol] 8.3 fL          Normal          6.4-10.8        [facility]  
   
                                        Comment on above:   Performed By: #### 2  
920860, 7075874, 8962805, 4636016,   
3865178, 98880982, 73249262, 71655502, 03859181, 9987489 ####  
[facility]  
[address] [place] [address]   
   
                      Platelets (Bld) [#/Vol] 207.0 E9/L Normal     150.0-500.0   
[facility]  
   
                                        Comment on above:   Performed By: #### 2  
768331, 9048656, 4768234, 3695478,   
5476954, 23379191, 57140576, 72483366, 30522258, 1847050 ####  
[facility]  
[address] [place] [address]   
   
                      RBC (Bld) [#/Vol] 4.1 E12/L  Low        4.3-5.9    [facility]  
   
                                        Comment on above:   Performed By: #### 2  
120758, 2788780, 9661127, 1013271,   
0703811, 58686406, 24469852, 53432849, 89587789, 1609553 ####  
[facility]  
[address] [place] [address]   
   
                                                    WBC corrected for nucl   
RBC Auto (Bld) [#/Vol] 7.1 E9/L        Normal          4.0-11.0        [facility]  
   
                                        Comment on above:   Performed By: #### 2  
210963, 1181127, 6059754, 2966464,   
5100036, 07906659, 50790732, 12434103, 41007501, 6656289 ####  
[facility]  
[address] [place] [address]   
   
                                                    CRPon 2020   
   
                      CRP [Mass/Vol] mg/L       Normal     <=1.9      [facility]  
   
                                        Comment on above:   Performed By: #### 2  
045129, 1918961, 6769263, 1386586,   
0056712, 67795527, 82554321, 83156101, 49818071, 5672204 ####  
[facility]  
[address] [place]  
[place] [address]   
   
                                                    CTA Cheston 2020   
   
                                        CTA Chest           Exam Date/Time:  
2020 13:55 EDT  
Reason for Exam:  
PE suspected, high   
prob;Other (please   
specify)  
Report  
IMPRESSION: NO   
EVIDENCE OF PULMONARY   
EMBOLI OR ACUTE   
FINDINGS IN THE   
CHEST.  
COPD, WHICH IS BEST   
EVALUATED CLINICALLY.  
EXAM: CTA Chest  
DATE: 2020  
CLINICAL HISTORY: PE   
suspected, high prob.  
COMPARISON: Chest   
pain. History of left   
pneumothorax with   
surgery.  
TECHNIQUE: Spiral   
enhanced images were   
obtained of the chest   
after the infusion of  
approximately 58 mL   
of Isovue 370   
contrast with   
pulmonary artery CTA   
protocol.  
Routine and volume   
rendered images were   
performed on a   
three-dimensional   
workstation.  
All CT scans at this   
facility use dose   
modulation, iterative   
reconstruction,   
and/or  
weight based dosing   
when appropriate to   
reduce radiation dose   
to as low as   
reasonably  
achievable.  
FINDINGS:  
There are no filling   
defects identified   
within the pulmonary   
arterial vasculature   
to  
suggest pulmonary   
emboli.  
Moderately advanced   
centrilobular   
emphysematous changes   
are present,   
predominantly of  
the mid and upper   
lung fields without   
significant labs or   
bowel.  
Postoperative changes   
within the medial   
left lung apex are   
otherwise   
unremarkable.  
Mild scarring and/or   
atelectasis is   
present in the lung   
apices and posterior   
bases.  
There are no other   
significant   
infiltrates,   
worrisome nodules,   
lymphadenopathy,  
pleural or   
pericardial   
effusions.  
There are no   
fractures identified.   
The limited imaging   
of the included upper   
abdomen  
  
Report  
is noncontributory.  
***** FINAL REPORT   
*****  
  
Dictated: 2020   
2:10 pm Renato Ashraf MD  
  
Signed (Electronic   
Signature):   
2020 2:10 pm  
Signed by: Renato Ashraf MD  
Transcribed by: ZIYAD   
Technologist: UMANG  
Technical Comments  
GFR (mL/min/1/73m2)   
60  
Contrast: Isovue 300  
Contrast amount in   
ml's: 58            Normal                                  [facility]  
   
                                                    Consent for Treatmenton    
   
                                        Consent for Treatment [id number]  
008  
[id number]  
.00CD:127           Normal                                  [facility]  
   
                                                    Discharge Instructionson    
   
                                        Discharge Instructions [id number]  
0080  
[id number]  
1.00CD:127          Normal                                  [facility]  
   
                                                    ED Clinical Summaryon 2020   
   
                                        ED Clinical Summary (Inserted Image.   
Unable to display)   
[address] [address]  
[phone]  
ED Clinical Summary  
Person Information  
Name: KATHI SELF/[place] Age:   
49 Years :   
1970  
Sex: Female Language:   
English PCP: BRITTNEY MEZA MD  
Marital Status:   
 Phone: [phone]  
MRN: [id number] Visit   
Id: FIN: [id number]  
Visit Reason: Chest   
pain; cp Speciality:   
Acuity: 3  
Enc Type: Emergency   
Med Service:   
Emergency  
Arrival: 2020   
12:14:46 Discharge:   
2020 15:17:00   
LOS: 000 03:03  
Checkin: 2020   
12:14:46 Checkout:   
2020 15:17:00   
Dispo Type: Home   
(Routine DC)  
  
EVENTS:  
Event Name Event   
Status Request   
Date/Time Start   
Date/Time Complete   
Date/Time  
Arrive Complete   
2020 12:14:46   
Document Home Meds   
Request 2020   
12:14:46  
Triage Complete   
2020 12:14:46   
2020 12:23:44   
2020 12:23:44  
Dr Exam Complete   
2020 12:16:44   
Registration Complete   
2020 12:16:44   
2020 12:18:02   
2020 13:02:59  
Bed Assign Complete   
2020 12:18:02   
RN Exam Complete   
2020 12:18:02   
2020 12:31:30   
2020 12:31:30  
EKG Complete   
2020 12:18:42   
2020 12:24:00  
Meds Admin Request   
2020 12:31:14  
Pending Labs Request   
2020 12:31:14  
Lab Complete   
2020 12:31:14   
2020 13:12:58  
Patient Care Request   
2020 12:31:14  
Pending Labs Complete   
2020 12:50:26   
2020 12:50:26   
2020 13:12:59  
Lab Complete   
2020 12:50:26   
2020 12:50:26   
2020 13:12:59  
Pending Labs Complete   
2020 12:58:47   
2020 12:58:47   
2020 12:58:57  
Lab Complete   
2020 12:58:47   
2020 12:58:47   
2020 12:58:57  
Reg Complete Request   
2020 13:02:59  
Reg Bed Request   
Complete 2020   
13:02:59 2020   
13:02:59 2020   
13:02:59  
CT Complete 2020   
13:16:31 2020   
13:25:58 2020   
13:55:27  
Pending Labs Cancel   
2020 13:19:32   
2020 14:09:40  
Lab Cancel 2020   
13:19:32 2020   
14:09:40  
Pending Labs Complete   
2020 14:04:59   
2020 14:04:59   
2020 14:20:03  
Pending Labs Complete   
2020 14:10:17   
2020 14:10:17   
2020 14:21:02  
Lab Complete   
2020 14:10:17   
2020 14:10:17   
2020 14:21:02  
Discharge Complete   
2020 14:40:32   
2020 15:17:05   
2020 15:17:05  
Transfer Complete   
2020 15:17:05   
ADDRESS:  
[facility] [place] [address]  
PHYS DOC NOTES:  
MEDICAL INFORMATION:  
Prescriptions Given:  
PATIENT EDUCATION   
INFORMATION:  
Instructions:  
Smoking Cessation;   
Chest Pain   
(Nonspecific);   
Pleurisy  
  
Follow up:  
With: Address: When:  
BRITTNEY MEZA [address]   
[place] [id number]  
[phone]   
Business (1) Within 2   
to 3 days  
Comments:  
Return to ED if   
symptoms worsen.  
Call Dr. Meza for a   
follow up   
appointment.  
DIAGNOSIS:  
1:Back pain;   
2:Pleurisy          Normal                                  [facility]  
   
                                                    ED Note-Physicianon 20   
   
                                        ED Note-Physician   Basic Information  
Time Seen:  
Chelsie Fields DO   
2020 12:16  
  
Chief Complaint  
pt arrived via   
private vehicle, c/o   
chest pain that   
radiates up her neck   
for a week, +nausea   
denies vomiting,   
cough, fever.  
History of Present   
Illness  
Pt 50 yo female   
presents with back   
pain and sensation   
that her lungs are   
burning. Onset of   
symptoms about three   
weeks ago. She had   
pain and swelling in   
her right leg, pain   
behind her right   
knee. Seen by her PCP   
and had outpatient   
D-dimer that she   
reports was less than   
250 four days ago.   
Her leg pain and   
swelling then   
resolved. During that   
time she had some   
mild to moderate   
discomfort to the mid   
back region on the   
right side more than   
the left side. Two   
days ago she noted   
that her back was   
hurting more and her   
lungs felt like they   
were burning. She   
does not feel short   
of breath and has not   
been coughing. Two   
days ago she noted   
blue discoloration to   
the tips of the four   
fingers in her right   
hand only, she was   
able to feel a pulse   
in her hand and her   
fingers did not feel   
cold. She took a   
shower, had dinner   
and her fingers   
looked better.   
Yesterday she did not   
feel too bad. Today   
her back pain   
continued and she   
developed nausea. No   
vomiting or diarrhea.   
No abdominal pain. No   
current leg pain or   
swelling. No   
headache, neck pain,   
focal extremity   
weakness, numbness,   
tingling, fevers,   
sore throat,   
dizziness, syncope,   
rashes, problems   
urinating, changes in   
activity level or   
falls. Previous   
spontaneous   
pneumothorax x3 on   
the left side that   
require surgery.   
Reports negative   
cardiac echo within   
the past few years.   
Smoker. Recent   
stressors with death   
of a pet, starting a   
new job and youngest   
child left for   
college.  
Review of Systems  
All organ systems are   
reviewed. Pertinent   
positive and negative   
findings as mentioned   
in the HPI.  
Physical Exam  
Vitals & Measurements  
T: 37.2 ?C (Oral) HR:   
66(Monitored) RR: 16   
BP: 145/97 SpO2: 100%  
HT: 160 cm HT: 160.0   
cm WT: 53.3 kg WT:   
53.3 kg BMI: 20.82  
Appropriate   
healthcare PPE was   
used in evaluating   
this patient. The   
patient was placed in   
a mask. The   
healthcare provider   
was wearing N95 mask,   
gloves, eye   
protection and   
utilizing proper hand   
hygiene. All   
equipment was   
properly cleansed.  
General: alert, no   
acute distress, here   
alone  
Skin: warm, dry,   
intact, no rashes, no   
petechiae, no   
cyanosis, no bruises  
Head: atraumatic,   
normocephalic  
Neck: full ROM, no   
tenderness to   
palpation  
Eye: vision grossly   
intact, clear   
conjunctiva  
ENT: moist mucous   
membranes, nares   
clear, voice normal  
Cardiovascular:   
regular rate and   
rhythm, no murmur,   
symmetric distal   
pulses palpated, no   
edema  
Respiratory: Lungs   
CTA, no   
wheezes/rales/rhonchi  
, non-labored  
Chest wall: no   
tenderness with   
palpation  
Gastrointestinal:   
soft, non-tender,   
normal bowel sounds,   
no guarding/rebound   
tenderness/rigidity,   
thin build  
Back: full ROM, no   
tenderness with   
palpation  
Extremities: no   
deformity, full ROM,   
normal strength,   
ambulatory  
Neurological: alert   
and oriented, normal   
speech, normal   
sensory and motor   
exam  
Medical Decision   
Making  
1317: Re-examined,   
nausea improved.   
Discussed ED tests.  
1435: CT results   
discussed with Pt.   
She is feeling much   
relieved at this   
time. Pt with   
symptoms for the past   
few days, constant   
and constant all day   
today. Negative work   
up in the ED with   
normal troponin, EKG   
with no ischemic   
changes, CTA with no   
acute findings. Pt   
was offered hospital   
observation but she   
feels well enough to   
go home. She will   
call her PCP for a   
follow up   
appointment.  
Assessment/Plan  
1. Back pain (M54.9:   
Dorsalgia,   
unspecified)  
2. Pleurisy (R09.1:   
Pleurisy)  
Orders:  
aspirin, 162 mg = 2   
tab(s), Tab-Chew,   
Oral, Once, Stop date   
20 12:30:00   
EDT, STAT, Start date   
20 12:30:00 EDT  
ondansetron, 4 mg = 2   
mL, Injection, IV   
Push, Once, Stop date   
20 12:30:00   
EDT, STAT, Start date   
20 12:30:00 EDT  
Sodium Chloride 0.9%   
intravenous solution   
1,000 mL, 1,000 mL,   
IV, 75 mL/hr, STAT,   
Start date 20   
12:30:00 EDT, 13.3   
hour(s), Total volume   
(mL): 1,000, 53.3 kg,   
1.54, m2  
Automated Diff  
Basic Metabolic Panel  
C-Reactive Protein  
CBC w/ Auto Diff  
CTA Chest  
ECG 12 Lead Adult  
ED Cardiac Monitoring  
eGFR  
Hepatic Function   
Panel  
Lipase Level  
Oxygen Saturation  
PT & PTT  
Saline Lock Insert  
Sedimentation Rate   
Automated  
Troponin 0 Hr.  
Troponin 3 Hr.  
Medications   
Administered  
Given  
Sodium Chloride 0.9%   
IV Sol 1000 mL 1,000   
mL, 1000 mL, IV  
aspirin 81 mg Chew   
Tab, 162 mg, Oral  
Zofran 4 mg/2 mL   
Injection, 4 mg, IV   
Push  
Disposition Plan  
Patient Discharge   
Condition  
Stable  
Discharge Disposition  
Home  
Discharge   
Prescription List  
Prescriptions  
No active   
prescription   
medications  
Follow-up  
With When Contact   
Information  
BRITTNEY MEZA Within 2   
to 3 days [address] [place] [address] [phone]   
Business (1)  
Additional   
Instructions: Return   
to ED if symptoms   
worsen. Call Dr. Meza for a follow up   
appointment.  
Patient Education  
Smoking Cessation  
Chest Pain   
(Nonspecific)  
Pleurisy  
Problem List/Past   
Medical History  
Ongoing  
Smoker  
Historical  
No qualifying data  
Medications  
Inpatient  
Sodium Chloride 0.9%   
IV Sol 1000 mL 1,000   
mL, 1000 mL, IV  
Home  
No active home   
medications  
Allergies  
No Known Medication   
Allergies  
Social History  
Alcohol - Denies   
Alcohol Use,   
2020  
Substance Abuse -   
Denies Substance   
Abuse, 2020  
Tobacco - High Risk,   
2020  
Lab Results  
WBC: 7.1 E9/L   
(20 12:41:00)  
RBC: 4.1 E12/L Low   
(20 12:41:00)  
Hgb: 12.4 gm/dL   
(20 12:41:00)  
Hct: 36.5 % (20   
12:41:00)  
MCV: 89.1 fL   
(20 12:41:00)  
MCH: 30.3 pg   
(20 12:41:00)  
MCHC: 34 gm/dL   
(20 12:41:00)  
RDW: 14 % (20   
12:41:00)  
Platelet: 207 E9/L   
(20 12:41:00)  
MPV: 8.3 fL (20   
12:41:00)  
Neutro Auto: 58.1 %   
(20 12:41:00)  
Lymph Auto: 29.5 %   
(20 12:41:00)  
Mono Auto: 10.5 %   
(20 12:41:00)  
Eos Auto: 1.1 %   
(20 12:41:00)  
Basophil Auto: 0.8 %   
(20 12:41:00)  
Neutro Absolute: 4.1   
E9/L (20   
12:41:00)  
Lymph Absolute: 2.1   
E9/L (20   
12:41:00)  
Mono Absolute: 0.7   
E9/L (20   
12:41:00)  
Eos Absolute: 0.1   
E9/L (20   
12:41:00)  
Basophil Absolute:   
0.1 E9/L (20   
12:41:00)  
Sed Rate Automated: 9   
mm/hr (20   
12:41:00)  
PT: 11.7 second(s)   
(20 12:41:00)  
INR: 1 (20   
12:41:00)  
PTT: 26.6 second(s)   
(20 12:41:00)  
Glucose Lvl: 106   
mg/dL (20   
12:41:00)  
BUN: 14 mg/dL   
(20 12:41:00)  
Creatinine: 0.9 mg/dL   
(20 12:41:00)  
eGFR: >60 (20   
12:41:00)  
eGFR AA: >60   
(20 12:41:00)  
BUN/Creat Ratio: 16   
(20 12:41:00)  
Sodium Lvl: 135   
mmol/L (20   
12:41:00)  
Potassium Lvl: 4   
mmol/L (20   
12:41:00)  
Chloride: 102 mmol/L   
(20 12:41:00)  
CO2: 24 mmol/L   
(20 12:41:00)  
AGAP: 13 mEq/L   
(20 12:41:00)  
Calcium Lvl: 8.8   
mg/dL Low (20   
12:41:00)  
Alk Phos: 44   
Int._Unit/L (20   
12:41:00)  
ALT: 14 Int._Unit/L   
(20 12:41:00)  
AST: 18 Int._Unit/L   
(20 12:41:00)  
Total Protein: 6.6   
gm/dL (20   
12:41:00)  
Albumin Lvl: 4 gm/dL   
(20 12:41:00)  
Globulin: 2.6 gm/dL   
(20 12:41:00)  
A/G Ratio: 1.5   
(20 12:41:00)  
Bili Total: 0.6 mg/dL   
(20 12:41:00)  
Bili Direct: 0.1   
mg/dL (20   
12:41:00)  
Bili Indirect: 0.5   
mg/dL (20   
12:41:00)  
Lipase Lvl: 23 unit/L   
(20 12:41:00)  
CRP: <0.5 (20   
12:41:00)  
Troponin: 2.4 pg/mL   
Low (20   
12:41:00)  
Diagnostic Results  
CTA Chest  
  
20 13:55:27  
GFR (mL/min/1/73m2)   
60  
Contrast: Isovue 300  
Contrast amount in   
ml?s: 58  
  
Signed By: Renato Ashraf MD  
  
20 14:13:22  
IMPRESSION: NO   
EVIDENCE OF PULMONARY   
EMBOLI OR ACUTE   
FINDINGS IN THE   
CHEST.  
COPD, WHICH IS BEST   
EVALUATED CLINICALLY.  
EXAM: CTA Chest  
DATE: 2020  
CLINICAL HISTORY: PE   
suspected, high prob.  
COMPARISON: Chest   
pain. History of left   
pneumothorax with   
surgery.  
TECHNIQUE: Spiral   
enhanced images were   
obtained of the chest   
after the infusion of  
approximately 58 mL   
of Isovue 370   
contrast with   
pulmonary artery CTA   
protocol.  
Routine and volume   
rendered images were   
performed on a   
three-dimensional   
workstation.  
All CT scans at this   
facility use dose   
modulation, iterative   
reconstruction,   
and/or  
weight based dosing   
when appropriate to   
reduce radiation dose   
to as low as   
reasonably  
achievable.  
FINDINGS:  
There are no filling   
defects identified   
within the pulmonary   
arterial vasculature   
to  
suggest pulmonary   
emboli.  
Moderately advanced   
centrilobular   
emphysematous changes   
are present,   
predominantly of  
the mid and upper   
lung fields without   
significant labs or   
bowel.  
Postoperative changes   
within the medial   
left lung apex are   
otherwise   
unremarkable.  
Mild scarring and/or   
atelectasis is   
present in the lung   
apices and posterior   
bases.  
There are no other   
significant   
infiltrates,   
worrisome nodules,   
lymphadenopathy,  
pleural or   
pericardial   
effusions.  
There are no   
fractures identified.   
The limited imaging   
of the included upper   
abdomen  
is noncontributory.  
  
Signed By: Renato Ashraf MD  
EKG Results  
EC20: SINUS   
RHYTHM  
POSSIBLE LEFT ATRIAL   
ENLARGEMENT  
POSSIBLE RIGHT   
VENTRICULAR   
CONDUCTION DELAY  
RATE 90, NORMAL [place]   
AND QRS, NORMAL AXIS,   
NO PREVIOUS  
  
BORDERLINE ECG  
Signed By: Chelsie Fields DO 2020   
12:31:43            Normal                                  [facility]  
   
                                        Comment on above:   Result Comment: Elec  
tronically Signed By: Chelsie Fields DO\.br\Date and Time Signed: 20 14:43 EDT   
   
                                                    ED Patient Education Noteon   
2020   
   
                                                    ED Patient Education   
Note                                    Family Medicine  
Smoking Cessation  
Quitting smoking is   
important to your   
health and has many   
advantages. However,   
it is not always easy   
to quit since   
nicotine is a very   
addictive drug.   
Oftentimes, people   
try 3 times or more   
before being able to   
quit. This document   
explains the best   
ways for you to   
prepare to quit   
smoking. Quitting   
takes hard work and a   
lot of effort, but   
you can do it.  
ADVANTAGES OF   
QUITTING SMOKING  
? You will live   
longer, feel better,   
and live better.  
? Your body will feel   
the impact of   
quitting smoking   
almost immediately.  
? Within 20 minutes,   
blood pressure   
decreases. Your pulse   
returns to its normal   
level.  
? After 8 hours,   
carbon monoxide   
levels in the blood   
return to normal.   
Your oxygen level   
increases.  
? After 24 hours, the   
chance of having a   
heart attack starts   
to decrease. Your   
breath, hair, and   
body stop smelling   
like smoke.  
? After 48 hours,   
damaged nerve endings   
begin to recover.   
Your sense of taste   
and smell improve.  
? After 72 hours, the   
body is virtually   
free of nicotine.   
Your bronchial tubes   
relax and breathing   
becomes easier.  
? After 2 to 12   
weeks, lungs can hold   
more air. Exercise   
becomes easier and   
circulation improves.  
? The risk of having   
a heart attack,   
stroke, cancer, or   
lung disease is   
greatly reduced.  
? After 1 year, the   
risk of coronary   
heart disease is cut   
in half.  
? After 5 years, the   
risk of stroke falls   
to the same as a   
nonsmoker.  
? After 10 years, the   
risk of lung cancer   
is cut in half and   
the risk of other   
cancers decreases   
significantly.  
? After 15 years, the   
risk of coronary   
heart disease drops,   
usually to the level   
of a nonsmoker.  
? If you are   
pregnant, quitting   
smoking will improve   
your chances of   
having a healthy   
baby.  
? The people you live   
with, especially any   
children, will be   
healthier.  
? You will have extra   
money to spend on   
things other than   
cigarettes.  
QUESTIONS TO THINK   
ABOUT BEFORE   
ATTEMPTING TO QUIT  
You may want to talk   
about your answers   
with your health care   
provider.  
? Why do you want to   
quit?  
? If you tried to   
quit in the past,   
what helped and what   
did not?  
? What will be the   
most difficult   
situations for you   
after you quit? How   
will you plan to   
handle them?  
? Who can help you   
through the tough   
times? Your family?   
Friends? A health   
care provider?  
? What pleasures do   
you get from smoking?   
What ways can you   
still get pleasure if   
you quit?  
Here are some   
questions to ask your   
health care provider:  
? How can you help me   
to be successful at   
quitting?  
? What medicine do   
you think would be   
best for me and how   
should I take it?  
? What should I do if   
I need more help?  
? What is smoking   
withdrawal like? How   
can I get information   
on withdrawal?  
GET READY  
? Set a quit date.  
? Change your   
environment by   
getting rid of all   
cigarettes, ashtrays,   
matches, and lighters   
in your home, car, or   
work. Do not let   
people smoke in your   
home.  
? Review your past   
attempts to quit.   
Think about what   
worked and what did   
not.  
GET SUPPORT AND   
ENCOURAGEMENT  
You have a better   
chance of being   
successful if you   
have help. You can   
get support in many   
ways.  
? Tell your family,   
friends, and   
coworkers that you   
are going to quit and   
need their support.   
Ask them not to smoke   
around you.  
? Get individual,   
group, or telephone   
counseling and   
support. Programs are   
available at local   
hospitals and health   
centers. Call your   
local health   
department for   
information about   
programs in your   
area.  
? Spiritual beliefs   
and practices may   
help some smokers   
quit.  
? Download a  quit   
meter  on your   
computer to keep   
track of quit   
statistics, such as   
how long you have   
gone without smoking,   
cigarettes not   
smoked, and money   
saved.  
? Get a self-help   
book about quitting   
smoking and staying   
off tobacco.  
LEARN NEW SKILLS AND   
BEHAVIORS  
? Distract yourself   
from urges to smoke.   
Talk to someone, go   
for a walk, or occupy   
your time with a   
task.  
? Change your normal   
routine. Take a   
different route to   
work. Drink tea   
instead of coffee.   
Eat breakfast in a   
different place.  
? Reduce your stress.   
Take a hot bath,   
exercise, or read a   
book.  
? Plan something   
enjoyable to do every   
day. Reward yourself   
for not smoking.  
? Explore interactive   
web-based programs   
that specialize in   
helping you quit.  
GET MEDICINE AND USE   
IT CORRECTLY  
Medicines can help   
you stop smoking and   
decrease the urge to   
smoke. Combining   
medicine with the   
above behavioral   
methods and support   
can greatly increase   
your chances of   
successfully quitting   
smoking.  
? Nicotine   
replacement therapy   
helps deliver   
nicotine to your body   
without the negative   
effects and risks of   
smoking. Nicotine   
replacement therapy   
includes nicotine   
gum, lozenges,   
inhalers, nasal   
sprays, and skin   
patches. Some may be   
available   
over-the-counter and   
others require a   
prescription.  
? Antidepressant   
medicine helps people   
abstain from smoking,   
but how this works is   
unknown. This   
medicine is available   
by prescription.  
? Nicotinic receptor   
partial agonist   
medicine simulates   
the effect of   
nicotine in your   
brain. This medicine   
is available by   
prescription.  
Ask your health care   
provider for advice   
about which medicines   
to use and how to use   
them based on your   
health history. Your   
health care provider   
will tell you what   
side effects to look   
out for if you choose   
to be on a medicine   
or therapy. Carefully   
read the information   
on the package. Do   
not use any other   
product containing   
nicotine while using   
a nicotine   
replacement product.  
RELAPSE OR DIFFICULT   
SITUATIONS  
Most relapses occur   
within the first 3   
months after   
quitting. Do not be   
discouraged if you   
start smoking again.   
Remember, most people   
try several times   
before finally   
quitting. You may   
have symptoms of   
withdrawal because   
your body is used to   
nicotine. You may   
crave cigarettes, be   
irritable, feel very   
hungry, cough often,   
get headaches, or   
have difficulty   
concentrating. The   
withdrawal symptoms   
are only temporary.   
They are strongest   
when you first quit,   
but they will go away   
within 10?14 days.  
To reduce the chances   
of relapse, try to:  
? Avoid drinking   
alcohol. Drinking   
lowers your chances   
of successfully   
quitting.  
? Reduce the amount   
of caffeine you   
consume. Once you   
quit smoking, the   
amount of caffeine in   
your body increases   
and can give you   
symptoms, such as a   
rapid heartbeat,   
sweating, and   
anxiety.  
? Avoid smokers   
because they can make   
you want to smoke.  
? Do not let weight   
gain distract you.   
Many smokers will   
gain weight when they   
quit, usually less   
than 10 pounds. Eat a   
healthy diet and stay   
active. You can   
always lose the   
weight gained after   
you quit.  
? Find ways to   
improve your mood   
other than smoking.  
FOR MORE INFORMATION  
www.smokefree.gov  
Document Released:   
2002 Document   
Revised: 2015   
Document Reviewed:   
2013  
ExitCare? Patient   
Information ?   
[employer]. This   
information is not   
intended to replace   
advice given to you   
by your health care   
provider. Make sure   
you discuss any   
questions you have   
with your health care   
provider. Chest Pain   
(Nonspecific)  
It is often hard to   
give a specific   
diagnosis for the   
cause of chest pain.   
There is always a   
chance that your pain   
could be related to   
something serious,   
such as a heart   
attack or a blood   
clot in the lungs.   
You need to follow up   
with your health care   
provider for further   
evaluation.  
(Inserted Image.   
Unable to display)  
CAUSES  
? Heartburn.  
? Pneumonia or   
bronchitis.  
? Anxiety or stress.  
? Inflammation around   
your heart   
(pericarditis) or   
lung (pleuritis or   
pleurisy).  
? A blood clot in the   
lung.  
? A collapsed lung   
(pneumothorax). It   
can develop suddenly   
on its own   
(spontaneous   
pneumothorax) or from   
trauma to the chest.  
? Shingles infection   
(herpes zoster   
virus).  
The chest wall is   
composed of bones,   
muscles, and   
cartilage. Any of   
these can be the   
source of the pain.  
? The bones can be   
bruised by injury.  
? The muscles or   
cartilage can be   
strained by coughing   
or overwork.  
? The cartilage can   
be affected by   
inflammation and   
become sore   
(costochondritis).  
DIAGNOSIS  
Lab tests or other   
studies may be needed   
to find the cause of   
your pain. Your   
health care provider   
may have you take a   
test called an   
ambulatory   
electrocardiogram   
(ECG). An ECG records   
your heartbeat   
patterns over a   
24-hour period. You   
may also have other   
tests, such as:  
? Transthoracic   
echocardiogram (TTE).   
During   
echocardiography,   
sound waves are used   
to evaluate how blood   
flows through your   
heart.  
? Transesophageal   
echocardiogram (DENNISE).  
? Cardiac monitoring.   
This allows your   
health care provider   
to monitor your heart   
rate and rhythm in   
real time.  
? Holter monitor.   
This is a portable   
device that records   
your heartbeat and   
can help diagnose   
heart arrhythmias. It   
allows your health   
care provider to   
track your heart   
activity for several   
days, if needed.  
? Stress tests by   
exercise or by giving   
medicine that makes   
the heart beat   
faster.  
TREATMENT  
? Treatment depends   
on what may be   
causing your chest   
pain. Treatment may   
include:  
? Acid blockers for   
heartburn.  
? Anti-inflammatory   
medicine.  
? Pain medicine for   
inflammatory   
conditions.  
? Antibiotics if an   
infection is present.  
? You may be advised   
to change lifestyle   
habits. This includes   
stopping smoking and   
avoiding alcohol,   
caffeine, and   
chocolate.  
? You may be advised   
to keep your head   
raised (elevated)   
when sleeping. This   
reduces the chance of   
acid going backward   
from your stomach   
into your esophagus.  
Most of the time,   
nonspecific chest   
pain will improve   
within 2?3 days with   
rest and mild pain   
medicine.  
HOME CARE   
INSTRUCTIONS  
? If antibiotics were   
prescribed, take them   
as directed. Finish   
them even if you   
start to feel better.  
? For the next few   
days, avoid physical   
activities that bring   
on chest pain.   
Continue physical   
activities as   
directed.  
? Do not use any   
tobacco products,   
including cigarettes,   
chewing tobacco, or   
electronic   
cigarettes.  
? Avoid drinking   
alcohol.  
? Only take medicine   
as directed by your   
health care provider.  
? Follow your health   
care provider's   
suggestions for   
further testing if   
your chest pain does   
not go away.  
? Keep any follow-up   
appointments you   
made. If you do not   
go to an appointment,   
you could develop   
lasting (chronic)   
problems with pain.   
If there is any   
problem keeping an   
appointment, call to   
reschedule.  
SEEK MEDICAL CARE IF:  
? Your chest pain   
does not go away,   
even after treatment.  
? You have a rash   
with blisters on your   
chest.  
? You have a fever.  
SEEK IMMEDIATE   
MEDICAL CARE IF:  
? You have increased   
chest pain or pain   
that spreads to your   
arm, neck, jaw, back,   
or abdomen.  
? You have shortness   
of breath.  
? You have an   
increasing cough, or   
you cough up blood.  
? You have severe   
back or abdominal   
pain.  
? You feel nauseous   
or vomit.  
? You have severe   
weakness.  
? You faint.  
? You have chills.  
This is an emergency.   
Do not wait to see if   
the pain will go   
away. Get medical   
help at once. Call   
your local emergency   
services (911 in   
[place] Do not drive   
yourself to the   
hospital.  
MAKE SURE YOU:  
? Understand these   
instructions.  
? Will watch your   
condition.  
? Will get help right   
away if you are not   
doing well or get   
worse.  
Document Released:   
2006 Document   
Revised: 2014   
Document Reviewed:   
2009  
ExitCare? Patient   
Information ?2015   
[employer]. This   
information is not   
intended to replace   
advice given to you   
by your health care   
provider. Make sure   
you discuss any   
questions you have   
with your health care   
provider. Pleurisy  
Pleurisy is an   
inflammation and   
swelling of the   
lining of the lungs   
(pleura). Because of   
this inflammation, it   
hurts to breathe. It   
can be aggravated by   
coughing, laughing,   
or deep breathing.   
Pleurisy is often   
caused by an   
underlying infection   
or disease.  
HOME CARE   
INSTRUCTIONS  
Monitor your pleurisy   
for any changes. The   
following actions may   
help to alleviate any   
discomfort you are   
experiencing:  
? Medicine may help   
with pain. Only take   
over-the-counter or   
prescription   
medicines for pain,   
discomfort, or fever   
as directed by your   
health care provider.  
? Only take   
antibiotic medicine   
as directed. Make   
sure to finish it   
even if you start to   
feel better.  
SEEK MEDICAL CARE IF:  
? Your pain is not   
controlled with   
medicine or is   
increasing.  
? You have an   
increase in pus-like   
(purulent) secretions   
brought up with   
coughing.  
SEEK IMMEDIATE   
MEDICAL CARE IF:  
? You have blue or   
dark lips,   
fingernails, or   
toenails.  
? You are coughing up   
blood.  
? You have increased   
difficulty breathing.  
? You have continuing   
pain unrelieved by   
medicine or pain   
lasting more than 1   
week.  
? You have pain that   
radiates into your   
neck, arms, or jaw.  
? You develop   
increased shortness   
of breath or   
wheezing.  
? You develop a   
fever, rash,   
vomiting, fainting,   
or other serious   
symptoms.  
MAKE SURE YOU:  
? Understand these   
instructions. ?  
? Will watch your   
condition. ?  
? Will get help right   
away if you are not   
doing well or get   
worse.  
?  
Document Released:   
2006 Document   
Revised: 2014   
Document Reviewed:   
2014  
ExitCare? Patient   
Information ?2015   
[employer]. This   
information is not   
intended to replace   
advice given to you   
by your health care   
provider. Make sure   
you discuss any   
questions you have   
with your health care   
provider.           Normal                                  [facility]  
   
                                                    ED Patient Summaryon   
020   
   
                                        ED Patient Summary  (Inserted Image.   
Unable to display)   
[address], [place] [id number] [phone]  
  
Patient Discharge   
Instructions  
  
Person Information  
Name: KATHI SELF Age:   
49 Years  
MRN: [id number] FIN:   
[id number]  
Arrival Date:   
2020 12:14:46  
Discharge Diagnosis:   
1:Back pain;   
2:Pleurisy  
Primary Care   
Physician: BRITTNEY MEZA MD  
  
Provider Information  
Primary Provider:  
Chelsie Fields DO  
Advanced Practice   
Clinician:None  
The exam and   
treatment you   
received in the   
Emergency Department   
were for an urgent   
problem and are not   
intended as complete   
care. It is important   
that you follow up   
with a doctor, nurse   
practitioner, or   
physician?s assistant   
for ongoing care. If   
your symptoms become   
worse or you do not   
improve as expected   
and you are unable to   
reach your usual   
health care provider,   
you should return to   
the Emergency   
Department. We are   
available 24 hours a   
day.  
  
KATHI SELF has been   
given the following   
list of patient   
education materials,   
prescriptions and   
follow-up   
instructions:  
  
Follow-up   
Instructions:  
With: Address: When:  
BRITTNEY MEZA [address] [place] [address]  
[phone]   
Business (1) Within 2   
to 3 days  
Comments:  
Return to ED if   
symptoms worsen.  
Call Dr. Meza for a   
follow up   
appointment.  
  
In the event that   
this physician does   
not participate in   
your insurance   
network, please   
consult with your   
insurance company to   
find a nearby   
participating   
provider.  
  
Patient Education   
Materials:  
Smoking Cessation;   
Chest Pain   
(Nonspecific);   
Pleurisy  
  
A MESSAGE TO ALL   
PATIENTS REGARDING   
OPIOIDS  
  
PRESCRIPTION OPIOIDS:   
WHAT YOU NEED TO KNOW  
  
Prescription opioids   
can be used to help   
relieve   
moderate-to-severe   
pain and are often   
prescribed following   
a surgery or injury,   
or for certain health   
conditions. These   
medications can be an   
important part of the   
treatment but also   
come with serious   
risks. It is   
important to work   
with your healthcare   
provider to make sure   
you are getting the   
safest, most   
effective care.  
  
WHAT ARE THE RISKS   
AND SIDE EFFECTS OF   
OPIOID USE?  
Prescription opioids   
carry serious risks   
of addiction and   
overdose, especially   
with prolonged use.   
An opioid overdose,   
often marked by   
slowed breathing, can   
cause sudden death.   
The use of   
prescription opioids   
can have a number of   
side effects as well,   
even when taken as   
directed:  
? Tolerance?meaning   
you might need to   
take more of the   
medication for the   
same pain relief  
? Physical   
dependence?meaning   
you have symptoms of   
withdrawal when a   
medication is stopped  
? Increased   
sensitivity to pain  
? Constipation  
? Nausea, vomiting,   
and dry mouth  
? Sleepiness and   
dizziness  
? Confusion  
? Depression  
? Low levels of   
testosterone that can   
result in lower sex   
drive, energy, and   
strength  
? Itching and   
sweating  
  
RISKS ARE GREATER   
WITH:  
? History of drug   
misuse, substance use   
disorder, or overdose  
? Mental health   
conditions (such as   
depression or   
anxiety)  
? Sleep apnea  
? Older age (65 years   
and older)  
? Pregnancy  
  
Avoid alcohol while   
taking prescription   
opioids. Also, unless   
specifically advised   
by your health care   
provider, medications   
to avoid include:  
? Benzodiazepines   
(such as Xanax or   
Valium)  
? Muscle relaxants   
(such as Soma or   
Flexeril)  
? Hypnotics (such as   
Ambien or Lunesta)  
? Other prescription   
opioids  
  
KNOW YOUR OPTIONS  
Talk to your health   
care provider about   
ways to manage your   
pain that don?t   
involve prescription   
opioids. Some of   
these options may   
actually work better   
and have fewer risks   
and side effects.   
Options may include:  
? Pain relievers such   
as acetaminophen,   
ibuprofen, and   
naproxen  
? Some medication   
that are also used   
for depression or   
seizures  
? Physical therapy   
and exercise  
? Cognitive   
behavioral therapy, a   
psychological,   
goal-directed   
approach, in which   
patients learn how to   
modify physical,   
behavioral, and   
emotional triggers of   
pain and stress.  
  
IF YOU ARE PRESCRIBED   
OPIOIDS FOR PAIN:  
? Never take opioids   
in greater amounts or   
more often than   
prescribed.  
? Follow up with your   
primary health care   
provider.  
o Work together to   
create a plan on how   
to manage your pain.  
o Talk about ways to   
help manage your pain   
that don?t involve   
prescription opioids.  
o Talk about any and   
all concerns and side   
effects.  
? Help prevent misuse   
and abuse  
o Never sell or share   
prescription opioids.  
o Never use another   
person?s prescription   
opioids.  
? Store prescription   
opioids in a secure   
place and out of   
reach of others (this   
may include visitors,   
children, friends,   
and family).  
? Safely dispose of   
unused prescription   
opioids: Find your   
community drug   
take-back program or   
your pharmacy   
mail-back program, or   
flush them down the   
toilet, following   
guidance from the   
Food and Drug   
Administration   
(www.fda.gov/Drugs/Re  
sourcesForYou).  
? Visit   
www.cdc.gov/drugoverd  
ose to learn about   
the risks of opioids   
abuse and overdose.  
? If you believe you   
may be struggling   
with addiction, tell   
your health care   
professional and ask   
for guidance or call   
[employer]?S National   
Helpline at   
4-438-271-HELP.  
  
  
v Source: [place]   
Department of Health   
and Human   
Services/Center for   
Disease Control &   
Prevention [employer]   
[employer]  
  
Medications Given:  
Medication Dose Route  
Sodium Chloride 0.9%   
intravenous solution   
1000.00 mL Initial   
Volume  
75.00 mL/hr IV Right   
Antecubit Alyssa  
aspirin 162.00 mg   
Oral  
ondansetron 4.00 mg   
IV Push Right   
Antecubit [place]  
  
Medication   
Information:  
Comment:  
Pharmacy Information:  
*********************  
*********************  
********************  
Thank you for   
choosing [facility]  
*********************  
*********************  
********************  
Patient Education   
Materials:  
Smoking Cessation  
Quitting smoking is   
important to your   
health and has many   
advantages. However,   
it is not always easy   
to quit since   
nicotine is a very   
addictive drug.   
Oftentimes, people   
try 3 times or more   
before being able to   
quit. This document   
explains the best   
ways for you to   
prepare to quit   
smoking. Quitting   
takes hard work and a   
lot of effort, but   
you can do it.  
ADVANTAGES OF   
QUITTING SMOKING  
? You will live   
longer, feel better,   
and live better.  
? Your body will feel   
the impact of   
quitting smoking   
almost immediately.  
? Within 20 minutes,   
blood pressure   
decreases. Your pulse   
returns to its normal   
level.  
? After 8 hours,   
carbon monoxide   
levels in the blood   
return to normal.   
Your oxygen level   
increases.  
? After 24 hours, the   
chance of having a   
heart attack starts   
to decrease. Your   
breath, hair, and   
body stop smelling   
like smoke.  
? After 48 hours,   
damaged nerve endings   
begin to recover.   
Your sense of taste   
and smell improve.  
? After 72 hours, the   
body is virtually   
free of nicotine.   
Your bronchial tubes   
relax and breathing   
becomes easier.  
? After 2 to 12   
weeks, lungs can hold   
more air. Exercise   
becomes easier and   
circulation improves.  
? The risk of having   
a heart attack,   
stroke, cancer, or   
lung disease is   
greatly reduced.  
? After 1 year, the   
risk of coronary   
heart disease is cut   
in half.  
? After 5 years, the   
risk of stroke falls   
to the same as a   
nonsmoker.  
? After 10 years, the   
risk of lung cancer   
is cut in half and   
the risk of other   
cancers decreases   
significantly.  
? After 15 years, the   
risk of coronary   
heart disease drops,   
usually to the level   
of a nonsmoker.  
? If you are   
pregnant, quitting   
smoking will improve   
your chances of   
having a healthy   
baby.  
? The people you live   
with, especially any   
children, will be   
healthier.  
? You will have extra   
money to spend on   
things other than   
cigarettes.  
QUESTIONS TO THINK   
ABOUT BEFORE   
ATTEMPTING TO QUIT  
You may want to talk   
about your answers   
with your health care   
provider.  
? Why do you want to   
quit?  
? If you tried to   
quit in the past,   
what helped and what   
did not?  
? What will be the   
most difficult   
situations for you   
after you quit? How   
will you plan to   
handle them?  
? Who can help you   
through the tough   
times? Your family?   
Friends? A health   
care provider?  
? What pleasures do   
you get from smoking?   
What ways can you   
still get pleasure if   
you quit?  
Here are some   
questions to ask your   
health care provider:  
? How can you help me   
to be successful at   
quitting?  
? What medicine do   
you think would be   
best for me and how   
should I take it?  
? What should I do if   
I need more help?  
? What is smoking   
withdrawal like? How   
can I get information   
on withdrawal?  
GET READY  
? Set a quit date.  
? Change your   
environment by   
getting rid of all   
cigarettes, ashtrays,   
matches, and lighters   
in your home, car, or   
work. Do not let   
people smoke in your   
home.  
? Review your past   
attempts to quit.   
Think about what   
worked and what did   
not.  
GET SUPPORT AND   
ENCOURAGEMENT  
You have a better   
chance of being   
successful if you   
have help. You can   
get support in many   
ways.  
? Tell your family,   
friends, and   
coworkers that you   
are going to quit and   
need their support.   
Ask them not to smoke   
around you.  
? Get individual,   
group, or telephone   
counseling and   
support. Programs are   
available at local   
hospitals and health   
centers. Call your   
local health   
department for   
information about   
programs in your   
area.  
? Spiritual beliefs   
and practices may   
help some smokers   
quit.  
? Download a  quit   
meter  on your   
computer to keep   
track of quit   
statistics, such as   
how long you have   
gone without smoking,   
cigarettes not   
smoked, and money   
saved.  
? Get a self-help   
book about quitting   
smoking and staying   
off tobacco.  
LEARN NEW SKILLS AND   
BEHAVIORS  
? Distract yourself   
from urges to smoke.   
Talk to someone, go   
for a walk, or occupy   
your time with a   
task.  
? Change your normal   
routine. Take a   
different route to   
work. Drink tea   
instead of coffee.   
Eat breakfast in a   
different place.  
? Reduce your stress.   
Take a hot bath,   
exercise, or read a   
book.  
? Plan something   
enjoyable to do every   
day. Reward yourself   
for not smoking.  
? Explore interactive   
web-based programs   
that specialize in   
helping you quit.  
GET MEDICINE AND USE   
IT CORRECTLY  
Medicines can help   
you stop smoking and   
decrease the urge to   
smoke. Combining   
medicine with the   
above behavioral   
methods and support   
can greatly increase   
your chances of   
successfully quitting   
smoking.  
? Nicotine   
replacement therapy   
helps deliver   
nicotine to your body   
without the negative   
effects and risks of   
smoking. Nicotine   
replacement therapy   
includes nicotine   
gum, lozenges,   
inhalers, nasal   
sprays, and skin   
patches. Some may be   
available   
over-the-counter and   
others require a   
prescription.  
? Antidepressant   
medicine helps people   
abstain from smoking,   
but how this works is   
unknown. This   
medicine is available   
by prescription.  
? Nicotinic receptor   
partial agonist   
medicine simulates   
the effect of   
nicotine in your   
brain. This medicine   
is available by   
prescription.  
Ask your health care   
provider for advice   
about which medicines   
to use and how to use   
them based on your   
health history. Your   
health care provider   
will tell you what   
side effects to look   
out for if you choose   
to be on a medicine   
or therapy. Carefully   
read the information   
on the package. Do   
not use any other   
product containing   
nicotine while using   
a nicotine   
replacement product.  
RELAPSE OR DIFFICULT   
SITUATIONS  
Most relapses occur   
within the first 3   
months after   
quitting. Do not be   
discouraged if you   
start smoking again.   
Remember, most people   
try several times   
before finally   
quitting. You may   
have symptoms of   
withdrawal because   
your body is used to   
nicotine. You may   
crave cigarettes, be   
irritable, feel very   
hungry, cough often,   
get headaches, or   
have difficulty   
concentrating. The   
withdrawal symptoms   
are only temporary.   
They are strongest   
when you first quit,   
but they will go away   
within 10?14 days.  
To reduce the chances   
of relapse, try to:  
? Avoid drinking   
alcohol. Drinking   
lowers your chances   
of successfully   
quitting.  
? Reduce the amount   
of caffeine you   
consume. Once you   
quit smoking, the   
amount of caffeine in   
your body increases   
and can give you   
symptoms, such as a   
rapid heartbeat,   
sweating, and   
anxiety.  
? Avoid smokers   
because they can make   
you want to smoke.  
? Do not let weight   
gain distract you.   
Many smokers will   
gain weight when they   
quit, usually less   
than 10 pounds. Eat a   
healthy diet and stay   
active. You can   
always lose the   
weight gained after   
you quit.  
? Find ways to   
improve your mood   
other than smoking.  
FOR MORE INFORMATION  
www.smokefree.gov  
Document Released:   
2002 Document   
Revised: 2015   
Document Reviewed:   
2013  
ExitCare? Patient   
Information ?   
[facility]. This   
information is not   
intended to replace   
advice given to you   
by your health care   
provider. Make sure   
you discuss any   
questions you have   
with your health care   
provider.  
Chest Pain   
(Nonspecific)  
It is often hard to   
give a specific   
diagnosis for the   
cause of chest pain.   
There is always a   
chance that your pain   
could be related to   
something serious,   
such as a heart   
attack or a blood   
clot in the lungs.   
You need to follow up   
with your health care   
provider for further   
evaluation.  
(Inserted Image.   
Unable to display)  
CAUSES  
? Heartburn.  
? Pneumonia or   
bronchitis.  
? Anxiety or stress.  
? Inflammation around   
your heart   
(pericarditis) or   
lung (pleuritis or   
pleurisy).  
? A blood clot in the   
lung.  
? A collapsed lung   
(pneumothorax). It   
can develop suddenly   
on its own   
(spontaneous   
pneumothorax) or from   
trauma to the chest.  
? Shingles infection   
(herpes zoster   
virus).  
The chest wall is   
composed of bones,   
muscles, and   
cartilage. Any of   
these can be the   
source of the pain.  
? The bones can be   
bruised by injury.  
? The muscles or   
cartilage can be   
strained by coughing   
or overwork.  
? The cartilage can   
be affected by   
inflammation and   
become sore   
(costochondritis).  
DIAGNOSIS  
Lab tests or other   
studies may be needed   
to find the cause of   
your pain. Your   
health care provider   
may have you take a   
test called an   
ambulatory   
electrocardiogram   
(ECG). An ECG records   
your heartbeat   
patterns over a   
24-hour period. You   
may also have other   
tests, such as:  
? Transthoracic   
echocardiogram (TTE).   
During   
echocardiography,   
sound waves are used   
to evaluate how blood   
flows through your   
heart.  
? Transesophageal   
echocardiogram (EDNNISE).  
? Cardiac monitoring.   
This allows your   
health care provider   
to monitor your heart   
rate and rhythm in   
real time.  
? Holter monitor.   
This is a portable   
device that records   
your heartbeat and   
can help diagnose   
heart arrhythmias. It   
allows your health   
care provider to   
track your heart   
activity for several   
days, if needed.  
? Stress tests by   
exercise or by giving   
medicine that makes   
the heart beat   
faster.  
TREATMENT  
? Treatment depends   
on what may be   
causing your chest   
pain. Treatment may   
include:  
? Acid blockers for   
heartburn.  
? Anti-inflammatory   
medicine.  
? Pain medicine for   
inflammatory   
conditions.  
? Antibiotics if an   
infection is present.  
? You may be advised   
to change lifestyle   
habits. This includes   
stopping smoking and   
avoiding alcohol,   
caffeine, and   
chocolate.  
? You may be advised   
to keep your head   
raised (elevated)   
when sleeping. This   
reduces the chance of   
acid going backward   
from your stomach   
into your esophagus.  
Most of the time,   
nonspecific chest   
pain will improve   
within 2?3 days with   
rest and mild pain   
medicine.  
HOME CARE   
INSTRUCTIONS  
? If antibiotics were   
prescribed, take them   
as directed. Finish   
them even if you   
start to feel better.  
? For the next few   
days, avoid physical   
activities that bring   
on chest pain.   
Continue physical   
activities as   
directed.  
? Do not use any   
tobacco products,   
including cigarettes,   
chewing tobacco, or   
electronic   
cigarettes.  
? Avoid drinking   
alcohol.  
? Only take medicine   
as directed by your   
health care provider.  
? Follow your health   
care provider's   
suggestions for   
further testing if   
your chest pain does   
not go away.  
? Keep any follow-up   
appointments you   
made. If you do not   
go to an appointment,   
you could develop   
lasting (chronic)   
problems with pain.   
If there is any   
problem keeping an   
appointment, call to   
reschedule.  
SEEK MEDICAL CARE IF:  
? Your chest pain   
does not go away,   
even after treatment.  
? You have a rash   
with blisters on your   
chest.  
? You have a fever.  
SEEK IMMEDIATE   
MEDICAL CARE IF:  
? You have increased   
chest pain or pain   
that spreads to your   
arm, neck, jaw, back,   
or abdomen.  
? You have shortness   
of breath.  
? You have an   
increasing cough, or   
you cough up blood.  
? You have severe   
back or abdominal   
pain.  
? You feel nauseous   
or vomit.  
? You have severe   
weakness.  
? You faint.  
? You have chills.  
This is an emergency.   
Do not wait to see if   
the pain will go   
away. Get medical   
help at once. Call   
your local emergency   
services (911 in   
[place] Do not drive   
yourself to the   
hospital.  
MAKE SURE YOU:  
? Understand these   
instructions.  
? Will watch your   
condition.  
? Will get help right   
away if you are not   
doing well or get   
worse.  
Document Released:   
2006 Document   
Revised: 2014   
Document Reviewed:   
2009  
ExitCare? Patient   
Information ?   
[employer]. This   
information is not   
intended to replace   
advice given to you   
by your health care   
provider. Make sure   
you discuss any   
questions you have   
with your health care   
provider.  
Pleurisy  
Pleurisy is an   
inflammation and   
swelling of the   
lining of the lungs   
(pleura). Because of   
this inflammation, it   
hurts to breathe. It   
can be aggravated by   
coughing, laughing,   
or deep breathing.   
Pleurisy is often   
caused by an   
underlying infection   
or disease.  
HOME CARE   
INSTRUCTIONS  
Monitor your pleurisy   
for any changes. The   
following actions may   
help to alleviate any   
discomfort you are   
experiencing:  
? Medicine may help   
with pain. Only take   
over-the-counter or   
prescription   
medicines for pain,   
discomfort, or fever   
as directed by your   
health care provider.  
? Only take   
antibiotic medicine   
as directed. Make   
sure to finish it   
even if you start to   
feel better.  
SEEK MEDICAL CARE IF:  
? Your pain is not   
controlled with   
medicine or is   
increasing.  
? You have an   
increase in pus-like   
(purulent) secretions   
brought up with   
coughing.  
SEEK IMMEDIATE   
MEDICAL CARE IF:  
? You have blue or   
dark lips,   
fingernails, or   
toenails.  
? You are coughing up   
blood.  
? You have increased   
difficulty breathing.  
? You have continuing   
pain unrelieved by   
medicine or pain   
lasting more than 1   
week.  
? You have pain that   
radiates into your   
neck, arms, or jaw.  
? You develop   
increased shortness   
of breath or   
wheezing.  
? You develop a   
fever, rash,   
vomiting, fainting,   
or other serious   
symptoms.  
MAKE SURE YOU:  
? Understand these   
instructions. ?  
? Will watch your   
condition. ?  
? Will get help right   
away if you are not   
doing well or get   
worse.  
?  
Document Released:   
2006 Document   
Revised: 2014   
Document Reviewed:   
2014  
ExitCare? Patient   
Information ?2015   
[employer]. This   
information is not   
intended to replace   
advice given to you   
by your health care   
provider. Make sure   
you discuss any   
questions you have   
with your health care   
provider.  
CLAIR PERALES AMY , have   
received the   
following patient   
education   
materials/instruction  
s and have verbalized   
understanding:  
Patient Education   
Materials: Smoking   
Cessation; Chest Pain   
(Nonspecific);   
Pleurisy  
  
Follow-up   
Instructions:  
With: Address: When:  
BRITTNEY MEZA [address] [place] [address]  
[phone]   
Business (1) Within 2   
to 3 days  
Comments:  
Return to ED if   
symptoms worsen.  
Call Dr. Meza for a   
follow up   
appointment.  
  
  
Patient   
Signature____________  
_____________________  
____________   
Date__________  
  
Clinician/Nurse   
Signature____________  
_____________________  
_____ Date___________  
2020 15:17:07  Normal                                  [facility]  
   
                                                    Hep Func Panelon 2020   
   
                      Albumin [Mass/Vol] 4.0 g/dL   Normal     3.3-5.0    [facility]  
   
                                        Comment on above:   Performed By: #### 2  
051761, 8234055, 8552068, 9922874,   
4679498, 57977329, 08842119, 08694754, 56343188, 6061548 ####  
[facility]  
[address] [place] [address]   
   
                      Albumin [Mass/Vol] 1.5 g/dL   Normal     1.1-2.2    [facility]  
   
                                        Comment on above:   Performed By: #### 2  
841697, 5766153, 4456458, 7740966,   
9912941, 16492743, 73037695, 34341373, 81674840, 6597335 ####  
[facility]  
[address] [place] [address]   
   
                                                    ALP [Catalytic   
activity/Vol]   44 Int._Unit/L  Normal          21-98           [facility]  
   
                                        Comment on above:   Performed By: #### 2  
523151, 4573767, 7671258, 7798812,   
1541896, 00780098, 83053504, 76270436, 10087821, 2970854 ####  
[facility]  
[address] [place] [address]   
   
                                                    ALT No additional P-5'-P   
[Catalytic activity/Vol] 14 Int._Unit/L  Normal          6-46            [facility]  
   
                                        Comment on above:   Performed By: #### 2  
223045, 9986531, 4598560, 5693499,   
3474397, 97536890, 85086739, 13481690, 97277777, 9220666 ####  
[facility]  
[address] [place] [address]   
   
                                                    AST [Catalytic   
activity/Vol]   18 Int._Unit/L  Normal          5-43            [facility]  
   
                                        Comment on above:   Performed By: #### 2  
987985, 8282039, 6573747, 0020587,   
8050935, 21286823, 36190850, 55222305, 82080359, 8039939 ####  
[facility]  
[address] [place] [address]   
   
                      Bilirubin [Mass/Vol] 0.6 mg/dL  Normal     0.0-1.1    [facility]  
   
                                        Comment on above:   Performed By: #### 2  
595869, 4571744, 5100549, 2674887,   
4845081, 45460371, 46404063, 29033621, 68269947, 9697714 ####  
[facility]  
[address] [place], [place] [address]   
   
                                                    Bilirubin.direct   
[Mass/Vol]      0.1 mg/dL       Normal          0.1-0.4         [facility]  
   
                                        Comment on above:   Performed By: #### 2  
382390, 2474779, 5409658, 6047714,   
8420314, 01783600, 36162666, 12096802, 28139101, 6584272 ####  
[facility]  
[address] [place] [address]   
   
                                                    Bilirubin.direct   
[Mass/Vol]      0.5 mg/dL       Normal          0.1-0.9         [facility]  
   
                                        Comment on above:   Performed By: #### 2  
617748, 2800669, 4538224, 0474626,   
6516501, 40417712, 30203798, 63082766, 54506606, 4799067 ####  
[facility]  
[address] [id number]   
   
                      Globulin (S) [Mass/Vol] 2.6 g/dL   Normal     1.4-4.0    F  
[facility]  
   
                                        Comment on above:   Performed By: #### 2  
261587, 1818382, 0436829, 9640593,   
1282249, 69458770, 59303501, 95178456, 51488483, 2009858 ####  
[facility]  
[address] [place] [address]   
   
                      Protein [Mass/Vol] 6.6 g/dL   Normal     6.0-7.8    [facility]  
   
                                        Comment on above:   Performed By: #### 2  
810569, 9878646, 8846872, 9375029,   
7637635, 91041748, 46916484, 66801950, 56754156, 0302919 ####  
[facility]  
[address] [place] [address]   
   
                                                    Lipase Levelon 2020   
   
                                                    Lipase [Catalytic   
activity/Vol]   23 unit/L       Normal          13-58           [facility]  
   
                                        Comment on above:   Performed By: #### 2  
965767, 6497196, 0222728, 6275124,   
5777927, 00001897, 89718925, 38866814, 44922958, 4732449 ####  
[facility]  
[address] [place] [address]   
   
                                                    PT & PTTon 2020   
   
                      aPTT Coag (PPP) [Time] 26.6 second(s) Normal     25.1-36.5  
  [facility]  
   
                                        Comment on above:   Result Comment: Hepa  
rin therapeutic range (represented by   
Anti-Factor Xa activity of 0.2 - 0.4  
U/mL) corresponds to PTT of 56.6 - 109.0 sec.   
   
                                                            Performed By: #### 2  
783163, 8510186, 6385424, 1644452,   
5496439, 49319733, 48904011, 22533333, 31204119, 2176440 ####  
[facility]  
[address] [place] [address]   
   
                                                    INR Coag (PPP) [Relative   
time]           1.0 {INR}                                       [facility]  
   
                                        Comment on above:   Result Comment: INR   
results are specifically intended to   
assess patients stabilized on long-term  
Anticoagulation therapy suggested INR?s  
?Less Intensive Anticoagulation? 2.0 ? 3.0  
Conventional Range 3.0 ? 4.5   
   
                                                            Performed By: #### 2  
998365, 6595514, 9753441, 4744685,   
9643612, 00268643, 62410471, 44645925, 85071914, 6955563 ####  
[facility]  
[address] [place] [address]   
   
                      PT Coag (PPP) [Time] 11.7 second(s) Normal     10.2-12.9    
[facility]  
   
                                        Comment on above:   Performed By: #### 2  
334603, 8135479, 1753262, 1416162,   
6166997, 76051164, 39555586, 26060166, 11403329, 2591778 ####  
[facility]  
[address] [place] [address]   
   
                                                    Sed Rate Automatedon   
020   
   
                      ESR (Bld) [Velocity] 9 mm/h     Normal     0-34       [facility]  
   
                                        Comment on above:   Performed By: #### 2  
277747, 7453685, 7776517, 9503591,   
3641780, 30853693, 73897130, 88361438, 21749343, 3309321 ####  
[facility]  
[address] [place] [address]   
   
                                                    Troponin 0 Hr.on 2020   
   
                                                    Troponin I.cardiac   
[Mass/Vol]      2.40 pg/mL      Low             10.10-27.10     [facility]  
   
                                        Comment on above:   Result Comment: The   
95% CI (Confidence Interval) PPV   
(Positive Predictive Value) for myocardial infarction in   
females is 38 pg/mL, in males 51 pg/mL. The results should be   
used in conjunction with clinical conditions of myocardial   
infarction.  
(Access High Sensitivity Troponin I Instructions For Use,   
[place], 2018)   
   
                                                            Performed By: #### 2  
422154, 3299236, 8944374, 3412642,   
5398959, 20250871, 42002887, 66844343, 23169061, 6924487 ####  
[facility]  
[address] [place] [address]   
   
                                                    eGFRon 2020   
   
                                                    GFR/1.73 sq M predicted   
among blacks MDRD   
(S/P/Bld) [Vol   
rate/Area]      mL/min/{1.73_m2} Normal          >=59            [facility]  
   
                                        Comment on above:   Order Comment: Order  
 added by Discern Expert.   
   
                                                            Result Comment: eGFR  
 is race adjusted. AA=.   
   
                                                            Performed By: #### 2  
789523, 0534466, 3743572, 6787147,   
8883315, 16544428, 08110575, 73116109, 58676150, 9033733 ####  
[facility]  
[address] [place] [address]   
   
                                                    GFR/1.73 sq M predicted   
among non-blacks MDRD   
(S/P/Bld) [Vol   
rate/Area]      mL/min/{1.73_m2} Normal          >=59            [facility]  
   
                                        Comment on above:   Order Comment: Order  
 added by Discern Expert.   
   
                                                            Result Comment:   
sven kidney disease could be indicated at   
eGFR's of less than 60 mL/min/1.73m2. Kidney failure is   
indicated at less than 15 mL/min/1.73m2.   
   
                                                            Performed By: #### 2  
294698, 9855171, 1062965, 9633128,   
2092076, 77903378, 84040408, 65874108, 11495959, 9310570 ####  
[facility]  
[address] [place] [address]   
  
  
  
Vital Signs  
  
  
                          Date Time    Vital Sign   Value        Performing   
Clinician                               Facility  
   
                                                    2025   
08:      Body height     160.02 cm                       [facility]  
   
                                                    2025   
08:                              Body mass index   
(BMI) [Ratio]       21.8 kg/m2                              [facility]  
   
                                                    2025   
08:      Body weight     55.84 kg                        [facility]  
   
                                                    2025   
08:                              Diastolic blood   
pressure            88 mm[Hg]                               [facility]  
   
                                                    2025   
08:      Heart rate      73 /min                         [facility]  
   
                                                    2025   
08:                              Systolic blood   
pressure            143 mm[Hg]                              [facility]  
   
                                                    2025   
14:                              Body mass index   
(BMI) [Ratio]             21.62 kg/m2               Bree Visci DO  
Work Phone:   
[phone]                          [facility]  
   
                                                    2025   
14:          Body weight         55.79 kg            Bree Visci DO  
Work Phone:   
[phone]                          [facility]  
   
                                                    2025   
14:                              Diastolic blood   
pressure                  70 mm[Hg]                 Bree Visci DO  
Work Phone:   
[phone]                          [facility]  
   
                                                    2025   
14:                              Systolic blood   
pressure                  120 mm[Hg]                Bree Visci DO  
Work Phone:   
[phone]                          [facility]  
   
                                                    2024   
11:      Body height     160.02 cm                       [facility]  
   
                                                    2024   
11:                              Body mass index   
(BMI) [Ratio]       21.7 kg/m2                              [facility]  
   
                                                    2024   
11:      Body weight     55.79 kg                        [facility]  
   
                                                    2024   
11:                              Diastolic blood   
pressure            84 mm[Hg]                               [facility]  
   
                                                    2024   
11:      Heart rate      76 /min                         [facility]  
   
                                                    2024   
11:                              Systolic blood   
pressure            120 mm[Hg]                              [facility]  
   
                                                    10-   
10:                              Body mass index   
(BMI) [Ratio]             21.97 kg/m2               Kathi KELLER  
Work Phone:   
[phone]                          [facility]  
   
                                                    10-   
10:          Body weight         56.7 kg             Kathi KELLER  
Work Phone:   
[phone]                          [facility]  
   
                                                    10-   
10:                              Diastolic blood   
pressure                  80 mm[Hg]                 Kathi KELLER  
Work Phone:   
[phone]                          [facility]  
   
                                                    10-   
10:                              Systolic blood   
pressure                  140 mm[Hg]                Kathi KELLER  
Work Phone:   
[phone]                          [facility]  
   
                                                    10-   
09:          Body height         160.02 cm           MD Brittney Meza  
Work Phone:   
[phone]                          [facility]  
   
                                                    10-   
09:                              Body mass index   
(BMI) [Ratio]             22 kg/m2                  MD Brittney Meza  
Work Phone:   
[phone]                          [facility]  
   
                                                    10-   
09:          Body weight         56.41 kg            MD Brittney Meza  
Work Phone:   
[phone]                          [facility]  
   
                                                    10-   
09:                              Diastolic blood   
pressure                  80 mm[Hg]                 MD Brittney Meza  
Work Phone:   
[phone]                          [facility]  
   
                                                    10-   
09:          Heart rate          96 /min             MD Brittney Meza  
Work Phone:   
[phone]                          [facility]  
   
                                                    10-   
09:          Respiratory rate    18 /min             MD Brittney Meza  
Work Phone:   
[phone]                          [facility]  
   
                                                    10-   
09:                              SaO2% (BldA) [Mass   
fraction]                 99 %                      MD Brittney Meza  
Work Phone:   
[phone]                          [facility]  
   
                                                    10-   
09:                              Systolic blood   
pressure                  122 mm[Hg]                MD Brittney Meza  
Work Phone:   
[phone]                          [facility]  
   
                                                    10-   
15:                              Diastolic blood   
pressure                  82 mm[Hg]                 Roque Spears MD  
Work Phone:   
[phone]                          [facility]  
   
                                                    10-   
15:                              Systolic blood   
pressure                  131 mm[Hg]                Roque Spears MD  
Work Phone:   
[phone]                          [facility]  
   
                                                    10-   
15:          Body height         160 cm              Roque Spears MD  
Work Phone:   
[phone]                          [facility]  
   
                                                    10-   
15:                              Body mass index   
(BMI) [Ratio]             22.6 kg/m2                Roque Spears MD  
Work Phone:   
[phone]                          [facility]  
   
                                                    10-   
15:          Body weight         57.88 kg            Roque Spears MD  
Work Phone:   
[phone]                          [facility]  
   
                                                    10-   
15:          Heart rate          84 /min             Roque Spears MD  
Work Phone:   
[phone]                          [facility]  
   
                                                    2024   
14:          Body height         160.02 cm           MD Brittney Meza  
Work Phone:   
[phone]                          [facility]  
   
                                                    2024   
14:                              Body mass index   
(BMI) [Ratio]             22.4 kg/m2                MD Brittney Meza  
Work Phone:   
[phone]                          [facility]  
   
                                                    2024   
14:          Body weight         57.6 kg             MD Brittney Meza  
Work Phone:   
[phone]                          [facility]  
   
                                                    2024   
14:                              Diastolic blood   
pressure                  78 mm[Hg]                 MD Brittney Meza  
Work Phone:   
[phone]                          [facility]  
   
                                                    2024   
14:          Heart rate          82 /min             MD Brittney Meza  
Work Phone:   
[phone]                          [facility]  
   
                                                    2024   
14:                              Systolic blood   
pressure                  135 mm[Hg]                MD Brittney Meza  
Work Phone:   
[phone]                          [facility]  
   
                                                    2024   
13:                              Body mass index   
(BMI) [Ratio]             22.39 kg/m2               Kathi KELLER  
Work Phone:   
[phone]                          [facility]  
   
                                                    2024   
13:          Body weight         57.79 kg            Kathi KELLER  
Work Phone:   
[phone]                          [facility]  
   
                                                    2024   
10:                              Body mass index   
(BMI) [Ratio]             22.5 kg/m2                Bree Visci DO  
Work Phone:   
[phone]                          [facility]  
   
                                                    2024   
10:          Body weight         58.06 kg            Bree Visci DO  
Work Phone:   
[phone]                          [facility]  
   
                                                    2024   
10:                              Diastolic blood   
pressure                  86 mm[Hg]                 Bree Visci DO  
Work Phone:   
[phone]                          [facility]  
   
                                                    2024   
10:                              Systolic blood   
pressure                  152 mm[Hg]                Bree Sanfordi DO  
Work Phone:   
[phone]                          [facility]  
   
                                                    2024   
11:          Body height         160.02 cm           MD Brittney Meza  
Work Phone:   
[phone]                          [facility]  
   
                                                    2024   
11:                              Body mass index   
(BMI) [Ratio]             22.4 kg/m2                MD Brtitney Meza  
Work Phone:   
[phone]                          [facility]  
   
                                                    2024   
11:          Body weight         57.6 kg             MD Brittney Meza  
Work Phone:   
[phone]                          [facility]  
   
                                                    2024   
11:                              Diastolic blood   
pressure                  79 mm[Hg]                 MD Brittney Meza  
Work Phone:   
[phone]                          [facility]  
   
                                                    2024   
11:          Heart rate          88 /min             MD Brittney Meza  
Work Phone:   
[phone]                          [facility]  
   
                                                    2024   
11:                              Systolic blood   
pressure                  121 mm[Hg]                MD Brittney Meza  
Work Phone:   
[phone]                          [facility]  
   
                                                    2024   
11:          Body height         160.02 cm           MD Brittney Meza  
Work Phone:   
[phone]                          [facility]  
   
                                                    2024   
11:                              Body mass index   
(BMI) [Ratio]             23 kg/m2                  MD Brittney Meza  
Work Phone:   
[phone]                          [facility]  
   
                                                    2024   
11:          Body weight         58.96 kg            MD Brittney Meza  
Work Phone:   
[phone]                          [facility]  
   
                                                    2024   
11:                              Diastolic blood   
pressure                  84 mm[Hg]                 MD Brittney Meza  
Work Phone:   
[phone]                          [facility]  
   
                                                    2024   
11:          Heart rate          86 /min             MD Brittney Meza  
Work Phone:   
[phone]                          [facility]  
   
                                                    2024   
11:                              Systolic blood   
pressure                  125 mm[Hg]                MD Brittney Meza  
Work Phone:   
[phone]                          [facility]  
   
                                                    2024   
07:          Body height         160.02 cm           MD Brittney Meza  
Work Phone:   
[phone]                          [facility]  
   
                                                    2024   
07:          Body temperature    97.2 [degF]         MD Brittney Meza  
Work Phone:   
[phone]                          [facility]  
   
                                                    2024   
07:          Body weight         57.6 kg             MD Brittney Meza  
Work Phone:   
[phone]                          [facility]  
   
                                                    2024   
07:                              Diastolic blood   
pressure                  90 mm[Hg]                 MD Brittney Meza  
Work Phone:   
[phone]                          [facility]  
   
                                                    2024   
07:          Heart rate          96 /min             MD Brittney Meza  
Work Phone:   
[phone]                          [facility]  
   
                                                    2024   
07:          Respiratory rate    20 /min             MD Brittney Meza  
Work Phone:   
[phone]                          [facility]  
   
                                                    2024   
07:                              SaO2% (BldA) [Mass   
fraction]                 100 %                     MD Brittney Meza  
Work Phone:   
[phone]                          [facility]  
   
                                                    2024   
07:                              Systolic blood   
pressure                  144 mm[Hg]                MD Brittney Meza  
Work Phone:   
[phone]                          [facility]  
   
                                                    2024   
11:      Body height     162.56 cm                       [facility]  
   
                                                    2024   
11:                              Body mass index   
(BMI) [Ratio]       22.3 kg/m2                              [facility]  
   
                                                    2024   
11:      Body weight     58.96 kg                        [facility]  
   
                                                    2024   
11:                              Diastolic blood   
pressure            86 mm[Hg]                               [facility]  
   
                                                    2024   
11:      Heart rate      85 /min                         [facility]  
   
                                                    2024   
11:                              Systolic blood   
pressure            136 mm[Hg]                              [facility]  
   
                                                    2024   
11:      Body height     162.56 cm                       [facility]  
   
                                                    2024   
11:                              Body mass index   
(BMI) [Ratio]       23 kg/m2                                [facility]  
   
                                                    2024   
11:      Body weight     60.78 kg                        [facility]  
   
                                                    2024   
11:                              Diastolic blood   
pressure            81 mm[Hg]                               [facility]  
   
                                                    2024   
11:      Heart rate      92 /min                         [facility]  
   
                                                    2024   
11:                              Systolic blood   
pressure            112 mm[Hg]                              [facility]  
   
                                                    2024   
09:          Body height         162.56 cm           Brittney Meza  
Other Phone:   
[phone]                           [facility]   
[employer]   
[employer]  
Other Phone:   
[phone]  
   
                                                    2024   
09:                              Body mass index   
(BMI) [Ratio]             23.34 kg/m2               Brittney Meza  
Other Phone:   
[phone]                           [facility]   
[employer]   
[employer]  
Other Phone:   
[phone]  
   
                                                    2024   
09:          Body weight         61.69 kg            Brittney Meza  
Other Phone:   
[phone]                           [facility]   
[employer]   
[employer]  
Other Phone:   
[phone]  
   
                                                    2024   
09:                              Diastolic blood   
pressure                  83 mm[Hg]                 Brittney Meza  
Other Phone:   
[phone]                           [facility]   
[employer]   
[employer]  
Other Phone:   
[phone]  
   
                                                    2024   
09:                              Systolic blood   
pressure                  123 mm[Hg]                Brittney Meza  
Other Phone:   
[phone]                           [facility]   
[employer]   
[employer]  
Other Phone:   
[phone]  
   
                                                    2023   
10:          Body height         162.56 cm           Brittney Meza  
Other Phone:   
[phone]                           [facility]   
[employer]   
[employer]  
Other Phone:   
[phone]  
   
                                                    2023   
10:                              Body mass index   
(BMI) [Ratio]             23.17 kg/m2               Brittney Meza  
Other Phone:   
[phone]                           [facility]   
[employer]   
[employer]  
Other Phone:   
[phone]  
   
                                                    2023   
10:          Body weight         61.24 kg            Brittney Meza  
Other Phone:   
[phone]                           [facility]   
[employer]   
[employer]  
Other Phone:   
[phone]  
   
                                                    2023   
10:                              Diastolic blood   
pressure                  83 mm[Hg]                 Brittney Meza  
Other Phone:   
[phone]                           [facility]   
[employer]   
[employer]  
Other Phone:   
[phone]  
   
                                                    2023   
10:                              Systolic blood   
pressure                  145 mm[Hg]                Brittney Meza  
Other Phone:   
[phone]                           [facility]   
[employer]   
[employer]  
Other Phone:   
[phone]  
   
                                                    2023   
11:          Body height         162.56 cm           Brittney Meza  
Other Phone:   
[phone]                           [facility]   
[employer]   
[employer]  
Other Phone:   
[phone]  
   
                                                    2023   
11:                              Body mass index   
(BMI) [Ratio]             22.31 kg/m2               Brittney Meza  
Other Phone:   
[phone]                           [facility]   
[employer]   
[employer]  
Other Phone:   
[phone]  
   
                                                    2023   
11:          Body weight         58.97 kg            Brittney Meza  
Other Phone:   
[phone]                           [facility]   
[employer]   
[employer]  
Other Phone:   
[phone]  
   
                                                    2023   
11:                              Diastolic blood   
pressure                  81 mm[Hg]                 Brittney Meza  
Other Phone:   
[phone]                           [facility]   
[employer]   
[employer]  
Other Phone:   
[phone]  
   
                                                    2023   
11:                              Systolic blood   
pressure                  125 mm[Hg]                Brittney Meza  
Other Phone:   
[phone]                           [facility]   
[employer]   
[employer]  
Other Phone:   
[phone]  
   
                                                    2023   
14:          Body height         162.56 cm           Fiona Bland  
Other Phone:   
[phone]                           [facility]   
[employer]   
[employer]  
Other Phone:   
[phone]  
   
                                                    2023   
14:                              Body mass index   
(BMI) [Ratio]             22.31 kg/m2               Fiona Bland  
Other Phone:   
[phone]                           [facility]   
[employer]   
[employer]  
Other Phone:   
[phone]  
   
                                                    2023   
14:          Body weight         58.97 kg            Fiona Bland  
Other Phone:   
[phone]                           [facility]   
[employer]   
[employer]  
Other Phone:   
[phone]  
   
                                                    2023   
14:                              Diastolic blood   
pressure                  82 mm[Hg]                 Fiona   
Edwina  
Other Phone:   
[phone]                           [facility]   
[employer]   
[employer]  
Other Phone:   
[phone]  
   
                                                    2023   
14:                              Systolic blood   
pressure                  120 mm[Hg]                Fiona   
Edwina  
Other Phone:   
[phone]                           [facility]   
[employer]   
[employer]  
Other Phone:   
[phone]  
   
                                                    2023   
15:          Body height         162.56 cm           Brittney Meza  
Work Phone:   
[phone]                          [facility]   
[email or web address]   
DO  
Work Phone:   
[phone]  
   
                                                    2023   
15:                              Body mass index   
(BMI) [Ratio]             22.66 kg/m2               Brittney Meza  
Work Phone:   
[phone]                          [facility]   
[address]   
DO  
Work Phone:   
[phone]  
   
                                                    2023   
15:                              Body surface area   
Derived from   
formula                   1.64 m2                   Brittney Meza  
Work Phone:   
[phone]                          [facility]   
[email or web address]   
DO  
Work Phone:   
[phone]  
   
                                                    2023   
15:          Body weight         59.88 kg            Brittney Meza  
Work Phone:   
[phone]                          [facility]   
[address]   
DO  
Work Phone:   
[phone]  
   
                                                    2023   
15:                              Diastolic blood   
pressure                  66 mm[Hg]                 Brittney Meza  
Work Phone:   
[phone]                          [facility]   
[address]   
DO  
Work Phone:   
[phone]  
   
                                                    2023   
15:          Heart rate          88 /min             Brittney Meza  
Work Phone:   
[phone]                          [facility] [facility]   
[facility] [address]   
DO  
Work Phone:   
[phone]  
   
                                                    2023   
15:                              Systolic blood   
pressure                  100 mm[Hg]                Brittney Meza  
Work Phone:   
[phone]                          [facility]   
[facility]   
DO  
Work Phone:   
[phone]  
   
                                                    2022   
00:                              136 1               Brittney Meza  
Work Phone:   
[phone]                          [facility]   
[facility] DO  
Work Phone:   
[phone]  
   
                                                    Comment on   
above:                                  [facility]   
   
                                                    11-   
13:          Body height         162.56 cm           Brittney Meza  
Work Phone:   
[phone]                          [facility]   
[facility] [address] DO  
Work Phone:   
[phone]  
   
                                                    11-   
13:                              Body mass index   
(BMI) [Ratio]             23.86 kg/m2               Brittney Meza  
Work Phone:   
[phone]                          [facility] [address] DO  
Work Phone:   
[phone]  
   
                                                    11-   
13:                              Body surface area   
Derived from   
formula                   1.68 m2                   Brittney Meza  
Work Phone:   
[phone]                          [facility] [address] DO  
Work Phone:   
[phone]  
   
                                                    11-   
13:          Body weight         63.05 kg            Brittney Meza  
Work Phone:   
[phone]                          [facility]   
[facility] [address] DO  
Work Phone:   
[phone]  
   
                                                    11-   
13:                              Diastolic blood   
pressure                  74 mm[Hg]                 Brittney Meza  
Work Phone:   
[phone]                          [facility]   
[facility] [address] DO  
Work Phone:   
[phone]  
   
                                                    11-   
13:          Heart rate          81 /min             Brittney Meza  
Work Phone:   
[phone]                          [facility]   
[facility] [address] DO  
Work Phone:   
[phone]  
   
                                                    11-   
13:                              Systolic blood   
pressure                  126 mm[Hg]                Brittney Meza  
Work Phone:   
[phone]                          [facility]   
[facility] [address] DO  
Work Phone:   
[phone]  
   
                                                    11-   
13:          Body height         162.56 cm           Brittney Meza  
Work Phone:   
[phone]                          [facility]   
[facility] [address] DO  
Work Phone:   
[phone]  
   
                                                    11-   
13:                              Body mass index   
(BMI) [Ratio]             23.86 kg/m2               Brittney Meza  
Work Phone:   
[phone]                          [facility]   
[facility] [address] DO  
Work Phone:   
[phone]  
   
                                                    11-   
13:                              Body surface area   
Derived from   
formula                   1.68 m2                   Brittney Meza  
Work Phone:   
[phone]                          [facility]   
[facility] [address] DO  
Work Phone:   
[phone]  
   
                                                    11-   
13:          Body weight         63.05 kg            Brittney Meza  
Work Phone:   
[phone]                          [facility]   
[facility] [address] DO  
Work Phone:   
[phone]  
   
                                                    11-   
13:                              Diastolic blood   
pressure                  84 mm[Hg]                 Brittney Meza  
Work Phone:   
[phone]                          [facility]   
[facility] [address] DO  
Work Phone:   
[phone]  
   
                                                    11-   
13:          Heart rate          81 /min             Brittney Meza  
Work Phone:   
[phone]                          [facility]   
[facility] [address] DO  
Work Phone:   
[phone]  
   
                                                    11-   
13:                              Systolic blood   
pressure                  126 mm[Hg]                Brittney Meza  
Work Phone:   
[phone]                          [facility]   
[facility] [address] DO  
Work Phone:   
[phone]  
  
  
  
Encounters  
  
  
                          Encounter Date Encounter Type Care Provider Facility  
   
                                                    Start: 2025  
End: 2025     ambulatory                              [facility]  
Work Phone:   
[phone]  
   
                                                    Start: 2025  
End: 2025                         Patient encounter   
procedure                                           [facility]   
[facility]  
Work Phone:   
[phone]  
   
                                                    Start: 2025  
End: 2025           Patient encounter status  Bree BLANCHARD Asad DO  
Work Phone:   
[phone]                          [facility]  
Work Phone:   
[phone]  
   
                                                    Start: 2025  
End: 2025                         Periodic preventive med   
est patient 40-64yrs                    Bree Marcano DO  
Work Phone:   
[phone]                          [facility]  
   
                                        Comment on above:   Encounter for gyneco  
logical examination with abnormal finding   
(Primary Dx);  
Encounter for screening mammogram for malignant neoplasm of breast;  
Screening for malignant neoplasm of cervix;  
Screening for HPV (human papillomavirus);  
Postmenopausal HRT (hormone replacement therapy);  
Vaginal irritation;  
Dyspareunia in female;  
Vaginal yeast infection   
   
                                                    Start: 2025  
End: 2025     ambulatory          BREE MARCANO     Not Available  
   
                                                    Start: 2024  
End: 2024                         Patient encounter   
procedure                                           [facility]   
[facility]  
Work Phone:   
[phone]  
   
                          Start: 2024 Non-patient / Non-visit               
 [facility] Physician   
[facility]  
Work Phone:   
[phone]  
   
                                                    Start: 2024  
End: 2024                         Patient encounter   
procedure                               Brittney Meza MD  
Work Phone:   
[phone]                          [facility]   
[address] [place]  
Work Phone:   
[phone]  
   
                                                    Start: 2024  
End: 2024           ambulatory                Brittney Meza MD  
Work Phone:   
[phone]                          [facility]  
Work Phone:   
[phone]  
   
                                                    Start: 2024  
End: 2024                         Patient encounter   
procedure                               Brittney Meza MD  
Work Phone:   
[phone]                          [facility]-CT Scan   
[address] [place]  
Work Phone:   
[phone]  
   
                                                    Start: 2024  
End: 2024           ambulatory                Brittney Meza MD  
Work Phone:   
[phone]                          [facility]  
Work Phone:   
[phone]  
   
                                                    Start: 10-  
End: 10-                         Patient encounter   
procedure                               MD Brittney Meza  
Work Phone:   
[phone]                          [facility]-Lab   
[facility]  
   
                                                    Start: 10-  
End: 10-           ambulatory                MD Brittney Meza  
Work Phone:   
[phone]                          [facility]  
Work Phone:   
[phone]  
   
                                                    Start: 10-  
End: 10-           Bamboo flowsheet          Kathi KELLER  
Work Phone:   
[phone]                          [facility]  
   
                                                    Start: 10-  
End: 10-           Bamboo flowsheet          Kathi Self PA  
Work Phone:   
[phone]                          [facility]  
   
                                                    Start: 10-  
End: 10-                         Office outpatient visit   
15 minutes                              Kathi KELLER  
Work Phone:   
[phone]                          [facility]  
   
                                        Comment on above:   Urine troubles;  
Diarrhea, unspecified type   
   
                                                    Start: 10-  
End: 10-     ambulatory          KATHI SELF           Not Available  
   
                                                    Start: 10-  
End: 10-                         Patient encounter   
procedure                               MD Brittney Meza  
Work Phone:   
[phone]                          [facility]   
[facility]  
   
                                                    Start: 10-  
End: 10-           ambulatory                MD Brittney Meza  
Work Phone:   
[phone]                          [facility]  
Work Phone:   
[phone]  
   
                                                    Start: 10-  
End: 10-           ambulatory                MD Brittney Meza  
Work Phone:   
[phone]                          [facility]  
Work Phone:   
[phone]  
   
                                                    Start: 10-  
End: 10-                         Patient encounter   
procedure                               MD Brittney Meza  
Work Phone:   
[phone]                          [facility]   
[facility]  
Work Phone:   
[phone]  
   
                                                    Start: 10-  
End: 10-           Departed Referred         MD Brittney Meza  
Work Phone:   
[phone]                          [facility] Main   
[place]  
Work Phone:   
[phone]  
   
                                                    Start: 10-  
End: 10-           ambulatory                MD Brittney Meza  
Work Phone:   
[phone]                          [facility]  
Work Phone:   
[phone]  
   
                                                    Start: 10-  
End: 10-                         Patient encounter   
procedure                               MD Brittney Meza  
Work Phone:   
[phone]                          [facility]  
Work Phone:   
[phone]  
   
                                                    Start: 10-  
End: 10-     ambulatory          [facility]  
   
Ambulatory  
   
                                                    Start: 10-  
End: 10-                         Office outpatient visit   
15 minutes                              Roque Spears MD  
Work Phone:   
[phone]                          [facility]  
   
                                        Comment on above:   Chest pain, unspecif  
ied type (Primary Dx);  
Premature ventricular contractions;  
Shortness of breath on exertion;  
Hyperlipidemia, unspecified hyperlipidemia type;  
Former smoker;  
Body mass index (BMI) 22.0-22.9, adult   
   
                                                    Start: 2024  
End: 2024           ambulatory                MD Brittney Meza  
Work Phone:   
[phone]                          [facility]  
Work Phone:   
[phone]  
   
                                                    Start: 2024  
End: 2024                         Patient encounter   
procedure                               MD Brittney Meza  
Work Phone:   
[phone]                          [facility]  
Work Phone:   
[phone]  
   
                                                    Start: 2024  
End: 2024           Bamboo flowsheet          Kathi KELLER  
Work Phone:   
[phone]                          [facility]  
   
                                                    Start: 2024  
End: 2024           External Result Encounter Ozzie Que DO  
Work Phone:   
[phone]                          [facility]   
Department Unsolicited  
   
                                                    Start: 2024  
End: 2024           External Result Encounter Ozzie Que DO  
Work Phone:   
[phone]                          [facility] External   
Department Unsolicited  
   
                                                    Start: 2024  
End: 2024                         Office outpatient visit   
15 minutes                              Kathi KELLER  
Work Phone:   
[phone]                          [facility]  
   
                                        Comment on above:   Vaginal pain   
   
                                                    Start: 2024  
End: 2024     ambulatory          KATHI SELF           Not Available  
   
                                                    Start: 2024  
End: 2024           Bamboo flowsheet          Bree A Visci DO  
Work Phone:   
[phone]                          [facility]  
   
                                                    Start: 2024  
End: 2024           Bamboo flowsheet          Bree A Visci DO  
Work Phone:   
[phone]                          [facility]  
   
                                                    Start: 2024  
End: 2024                         Office outpatient visit   
15 minutes                              Bree BLANCHARD Visci DO  
Work Phone:   
[phone]                          [facility]  
   
                                        Comment on above:   Vulvar itching;  
Vaginal discharge;  
BV (bacterial vaginosis);  
Vaginal yeast infection   
   
                                                    Start: 2024  
End: 2024     ambulatory          BREE A VISCI     Not Available  
   
                                                    Start: 2024  
End: 2024           ambulatory                MD Brittney Meza  
Work Phone:   
[phone]                          [facility]  
Work Phone:   
[phone]  
   
                                                    Start: 2024  
End: 2024                         Patient encounter   
procedure                               MD Brittney Meza  
Work Phone:   
[phone]                          [facility]  
Work Phone:   
[phone]  
   
                                                    Start: 2024  
End: 2024                         Patient encounter   
procedure                               MD Brittney Meza  
Work Phone:   
[phone]                          [facility]Ray   
[facility]   
Ctr  
   
                                                    Start: 2024  
End: 2024           ambulatory                MD Brittney Meza  
Work Phone:   
[phone]                          [facility]  
Work Phone:   
[phone]  
   
                                                    Start: 2024  
End: 2024           ambulatory                MD Brittney Meza  
Work Phone:   
[phone]                          [facility]  
Work Phone:   
[phone]  
   
                                                    Start: 2024  
End: 2024                         Patient encounter   
procedure                               MD Brittney Meza  
Work Phone:   
[phone]                          [facility]  
Work Phone:   
[phone]  
   
                                                    Start: 2024  
End: 2024                         Emergency department   
patient visit                           MD Brittney Meza  
Work Phone:   
[phone]                          [facility]Emergency   
Room  
Work Phone:   
[phone]  
   
                                                    Start: 2024  
End: 2024     ambulatory                              [facility]   
Center  
Work Phone:   
[phone]  
   
                                                    Start: 2024  
End: 2024                         Patient encounter   
procedure                                           [facility]   
[facility]  
Work Phone:   
[phone]  
   
                                                    Start: 2024  
End: 2024     ambulatory                              [facility]   
Center  
Work Phone:   
[phone]  
   
                                                    Start: 2024  
End: 2024                         Patient encounter   
procedure                                           [facility] Phys[facility]   
[facility]  
Work Phone:   
[phone]  
   
                          Start: 2024 Non-patient / Non-visit               
 [facility] Physician   
[facility]   
Professional Co  
Work Phone:   
[phone]  
   
                                                    Start: 2024  
End: 2024           ambulatory                Brittney Meza  
Other Phone:   
[phone]                           [facility]   
Professional   
Corporation  
Other Phone:   
[phone]  
   
                          Start: 2024 Telephone encounter Brittney Meza [facility]  
   
                                                    Start: 2024  
End: 2024           ambulatory                Brittney Meza  
Other Phone:   
[phone]                           [facility]   
Professional   
Corporation  
Other Phone:   
[phone]  
   
                                        Start: 2024   Office outpatient vi  
sit   
15 minutes                Brittney Meza              [facility]  
   
                                                    Start: 12-  
End: 12-           ambulatory                Drew Mejia  
Other Phone:   
[phone]                           [facility]   
Professional   
Corporation  
Other Phone:   
[phone]  
   
                                        Start: 12-   Nursing evaluation o  
f   
patient and report        Drew Mejia             [facility]  
   
                          Start: 12- Telephone encounter Drew Mejia   
[facility]  
   
                                                    Start: 2023  
End: 2023           ambulatory                Brittney Meza  
Other Phone:   
[phone]                           [facility]   
Professional   
Corporation  
Other Phone:   
[phone]  
   
                          Start: 2023 Telephone encounter Brittney Meza [facility]  
   
                                                    Start: 2023  
End: 2023           ambulatory                Brittney Meza  
Other Phone:   
[phone]                           [facility]   
Professional   
Corporation  
Other Phone:   
[phone]  
   
                          Start: 2023 Telephone encounter Brittney Meza [facility]  
   
                                                    Start: 2023  
End: 2023           ambulatory                Brittney Meza  
Other Phone:   
[phone]                           [facility]   
Professional   
Corporation  
Other Phone:   
[phone]  
   
                                        Start: 2023   Office outpatient vi  
sit   
15 minutes                Brittney Meza              [facility]  
   
                                                    Start: 2023  
End: 2023           ambulatory                Brittney Meza  
Other Phone:   
[phone]                           [facility]   
Professional   
Corporation  
Other Phone:   
[phone]  
   
                          Start: 2023 Telephone encounter Brittney Kwabena [facility]  
   
                                                    Start: 2023  
End: 2023           ambulatory                Brittney Meza  
Other Phone:   
[phone]                           [facility]   
Professional   
Corporation  
Other Phone:   
[phone]  
   
                          Start: 2023 Telephone encounter Brittney Kwabena [facility]  
   
                                                    Start: 2023  
End: 2023           ambulatory                Brittney Meza  
Other Phone:   
[phone]                           [facility]   
Professional   
Corporation  
Other Phone:   
[phone]  
   
                                        Start: 2023   Encounter for genera  
l   
adult medical examination   
without abnormal findings Brittney Meza              [facility]  
   
                                        Start: 2023   Office outpatient vi  
sit   
15 minutes                Brittney Meza              [facility]  
   
                                                    Start: 2023  
End: 2023           ambulatory                Fiona Bland  
Other Phone:   
[phone]                           [facility]   
Professional   
Corporation  
Other Phone:   
[phone]  
   
                                        Start: 2023   Office outpatient vi  
sit   
15 minutes                Fiona Bland       [facility]  
   
                                Start: 2023 Chart Update    Brittney Meza  
Work Phone:   
[phone]                          [facility] DO  
Work Phone:   
[phone]  
   
                                Start: 2023 ambulatory      Dr. Brittney Meza                                   Facility:[id number]  
   
                                        Start: 2023   Office outpatient vi  
sit   
25 minutes                              Brittney Meza  
Work Phone:   
[phone]                          [facility]   
[facility] [address] DO  
Work Phone:   
[phone]  
   
                                Start: 2023 ambulatory      Dr. Brittney Meza                                   Facility:  
   
                                Start: 12- Chart Update    Brittney Meza  
Work Phone:   
[phone]                          [facility]  
Work Phone:   
[phone]  
   
                                Start: 2022 ambulatory      Dr. Roque Spears                              Facility:[id number]  
   
                                Start: 2022 Adult health examination Chelsi akers Edwina  
Other Phone:   
[phone]                           [facility]   
Professional   
Corporation  
Other Phone:   
[phone]  
   
                                Start: 2022 Chart Update    Brittney Meza  
Work Phone:   
[phone]                          [facility]  
Work Phone:   
[phone]  
   
                                                    Start: 2022  
End: 2022           ambulatory                MD Brittney Meza  
Work Phone:   
[phone]                          [facility]  
Work Phone:   
[phone]  
   
                                                    Start: 2022  
End: 2022                         Patient encounter   
procedure                               MD Brittney Meza  
Work Phone:   
[phone]                          [facility] [address]  
   
                                        Start: 11-   Office outpatient ne  
w 60   
minutes                                 Brittney Meza  
Work Phone:   
[phone]                          [facility]  
Work Phone:   
[phone]  
   
                                Start: 11- ambulatory      Dr. Roque Spears                              Facility:  
   
                                                    Start: 10-  
End: 10-     ambulatory          RADHA MONROE     Facility:H1  
   
                                                    Start: 10-  
End: 10-     ambulatory          RADHA MONROE     Facility:H1  
   
                                                    Start: 10-  
End: 10-     ambulatory          MIKAELA SUAREZ     Facility:H1  
   
                          Start: 10- Ambulatory   ROQUE SPEARS Faci  
lity:1532  
  
  
  
Procedures  
  
  
                          Date         Procedure    Procedure Detail Performing   
Clinician  
   
                                        Start: 2025   Smr prim src wet roma  
nt nfct   
agt                                                 Bree A Visci DO  
Work Phone:   
[phone]  
   
                          Start: 2025 Mammography               Bree Vi  
sci DO  
Work Phone:   
[phone]  
   
                          Start: 2024 US scan of thyroid              Pedro Meza MD  
Work Phone:   
[phone]  
   
                                        Start: 2024   Computed tomography   
of   
abdomen and pelvis with   
contrast                                            Brittney Meza MD  
Work Phone:   
[phone]  
   
                          Start: 2024 CT of thorax with contrast            
    Brittney Meza MD  
Work Phone:   
[phone]  
   
                                        Start: 10-   Urnls dip stick/tabl  
et rgnt   
non-auto w/o micrscp                                Kathi KELLER  
Work Phone:   
[phone]  
   
                                        Start: 10-   Bacteria identified   
in   
Urine by Culture                                    MD Brittney Meza  
Work Phone:   
[phone]  
   
                          Start: 10- Urine culture              Brittney brandon MD  
Work Phone:   
[phone]  
   
                                        Start: 2024   URETHRITIS/DISCHARGE  
 PLUS   
VAGINITIS (HTRX)                                    Ozzie Grey DO  
Work Phone:   
[phone]  
   
                                        Start: 2024   Smr prim src wet roma  
nt nfct   
agt                                                 Bree BLANCAHRD Visci DO  
Work Phone:   
[phone]  
   
                                        Start: 2024   X-ray of lumbar spin  
e, two   
or three views                                      MD Brittney Meza  
Work Phone:   
[phone]  
   
                          Start: 2024 Mammography               Bree Vi  
sci DO  
Work Phone:   
[phone]  
   
                          Start: 2022 Plain chest X-ray              MD Giselle Meza  
Work Phone:   
[phone]  
   
                                        Start: 2021   Microscopic observat  
ion   
[Identifier] in Cervix by   
Cyto stain                                          Bree Visci DO  
Work Phone:   
[phone]  
   
                          Start: 2019 Total colonoscopy              Sara Meza  
Work Phone:   
[phone]  
   
                                        Start: 2014   General examination   
of   
patient                                             [place] Edwina  
Other Phone:   
[phone]  
   
                          Start: 2011 Colonoscopy               Bree Vi  
sci DO  
Work Phone:   
[phone]  
   
                                       Operation on lung              Brittney brown  
Work Phone:   
[phone]  
   
                                                            Tonsillectomy and   
adenoidectomy                                       Brittney Meza  
Work Phone:   
[phone]  
  
  
  
Plan of Treatment  
  
  
                          Date         Care Activity Detail       Author  
   
                                                    Start: 2026  
End: 2026                         Patient encounter   
procedure                               2026 2:30 PM EST   
Office Visit [facility]   
OB [address] [place]   
[id number] [phone]   
Bree Marcano LO, DO   
[address]   
[place] [address]   
[phone] (Work)   
[phone] (Fax)                      [facility]  
   
                                                    Start: 2026  
End: 2026                         DBT Breast - bilateral   
screening                               Bilateral screening   
mammogram with   
tomosynthesis Imaging   
Routine Encounter for   
screening mammogram for   
malignant neoplasm of   
breast Expected:   
2026, Expires:   
2026                              [facility]  
   
                                        Comment on above:   Expected: 2026  
, Expires: 2026   
   
                                        Start: 2026   Screening for malign  
ant   
neoplasm of breast        Mammogram                 [facility]  
   
                                                    Start: 10-  
End: 10-                         Patient encounter   
procedure                               10/21/2025 2:30 PM EDT   
Office Visit [facility] [address] [facility] [address]   
[address] [place]   
[address] [phone]   
Roque Spears MD   
[address] [facility] [address],   
[address] [place]   
[address] [phone]   
(Work) [phone]   
(Fax)                                   [facility]  
   
                                                    Start: 2025  
End: 2025                         Professional / ancillary   
services management                     2025 3:30 PM EST   
Ancillary Procedure   
[facility]  MR [address] [place] [place]   
[id number] [phone]                 [facility]  
   
                                                    Start: 2025  
End: 2025                         Patient encounter   
procedure                               2025 2:15 PM EST   
Office Visit [facility]   
OB [address]   
[address] [place]   
[address] [phone]   
Bree Marcano, DO   
[address]   
[place] [address]   
[phone] (Work)   
[phone] (Fax)                      [facility]  
   
                                        Start: 2025   Screening for malign  
ant   
neoplasm of breast        Mammogram                 [facility]  
   
                                        Start: 2024   Screening for malign  
ant   
neoplasm of cervix                                  [facility]  
   
                          Start: 10-                           [facility]  
   
                                                    Start: 10-  
End: 10-                         Campylobacter culture,   
stool                                   Campylobacter culture,   
stool Microbiology   
Routine Diarrhea,   
unspecified type   
Expected: 10/30/2024   
(Approximate), Expires:   
10/30/2025                              [facility]  
   
                                        Comment on above:   Expected: 10/30/2024  
 (Approximate), Expires: 10/30/2025   
   
                                                    Start: 10-  
End: 10-                         Ova and parasite   
examination                             Ova and parasite   
examination   
Microbiology Routine   
Diarrhea, unspecified   
type Expected:   
10/30/2024   
(Approximate), Expires:   
10/30/2025                              [facility]  
   
                                        Comment on above:   Expected: 10/30/2024  
 (Approximate), Expires: 10/30/2025   
   
                                                    Start: 10-  
End: 10-           Stool culture             Stool culture   
Microbiology Routine   
Diarrhea, unspecified   
type Expected:   
10/30/2024   
(Approximate), Expires:   
10/30/2025                              [facility]  
   
                                        Comment on above:   Expected: 10/30/2024  
 (Approximate), Expires: 10/30/2025   
   
                                                    Start: 10-  
End: 10-           Vibrio culture, stool     Vibrio culture, stool   
Lab Routine Diarrhea,   
unspecified type   
Expected: 10/30/2024   
(Approximate), Expires:   
10/30/2025                              [facility]  
   
                                        Comment on above:   Expected: 10/30/2024  
 (Approximate), Expires: 10/30/2025   
   
                                                    Start: 10-  
End: 10-           Yersinia culture, stool   Yersinia culture, stool   
Microbiology Routine   
Diarrhea, unspecified   
type Expected:   
10/30/2024   
(Approximate), Expires:   
10/30/2025                              [facility]  
   
                                        Comment on above:   Expected: 10/30/2024  
 (Approximate), Expires: 10/30/2025   
   
                                                    Start: 10-  
End: 10-                         Patient encounter   
procedure                               10/30/2024 10:20 AM EDT   
Office Visit [facility]   
OB [address] [place] [address] [place]   
[address] [phone]   
Kathi Self    
[facility] [place] [address]   
[phone] (Work)   
[phone] (Fax)   
Arrived                                 [facility]  
   
                                        Comment on above:   Arrived   
   
                                        Start: 10-   Bacteria identified   
in   
Urine by Culture          Urine Culture             [facility]  
   
                          Start: 10- Urine culture              [facility]  
   
                                                    Start: 2024  
End: 2024                         Patient encounter   
procedure                               2024 1:10 PM EDT   
Office Visit [facility]   
 [facility] [place]   
[id number] [phone]   
Kathi Self    
[facility] [place] [address] [phone] (Work)   
[phone] (Fax)   
Arrived                                 [facility]  
   
                                        Comment on above:   Arrived   
   
                                                    Start: 2024  
End: 2024                         Patient encounter   
procedure                               2024 10:00 AM EDT   
[facility]   
OB [address] [facility]   
[address] [place]   
[address] [phone]   
Bree Marcano, DO   
[address]   
[place] [address]   
[phone] (Work)   
[phone] (Fax)   
Arrived                                 [facility]  
   
                                        Comment on above:   Arrived   
   
                                        Start: 2024   COVID-19 Vaccine ( season)                         COVID-19 Vaccine ( season)                         [facility]  
   
                          Start: 2024 Influenza vaccination Influenza Vacc  
ine (#1) [facility]  
   
                                        Start: 10-   FUV, Provider:   
Roque Spears,   
Status: Pen, Time: 2:10   
PM                                      [facility], Provider:   
Roque Spears,   
Status: Pen, Time: 2:10   
PM                                      [facility] 250   
DO  
Work Phone:   
[phone]  
   
                                        Start: 2023   [facility]UV, Provider:   
Roque Spears,   
Status: Pen, Time: 3:20   
PM                                      [facility], Provider:   
Traboulssi,Mourhaf,   
Status: Pen, Time: 3:20   
PM                                      [facility] 600 DO  
Work Phone:   
[phone]  
   
                                        Start: 2022   STRESS CORINE, Provider  
:   
[place] LOERA NUCLEAR   
[id number], Status:   
Pen, Time: 2:00 PM                      STRESS CORINE, Provider:   
[place] ROMOI NUCLEAR   
01,[id number], Status:   
Pen, Time: 2:00 PM                      [facility] 600 DO  
Work Phone:   
[phone]  
   
                                        Start: 2021   Screening for malign  
ant   
neoplasm of colon                                   [facility]  
   
                                Start: 09- Zoster Vaccines (1 of 2) Zoste  
r Vaccines (1 of   
2)                                      [facility]  
   
                                        Start: 2009   MMR Vaccines (1 of 1  
 -   
Standard series)                        MMR Vaccines (1 of 1 -   
Standard series)                        [facility]  
   
                                        Start: 09-   Screening for malign  
ant   
neoplasm of cervix        HPV/Cotest                [facility]  
   
                                        Start: 09-   DTaP/Tdap/Td Vaccine  
s (1   
- Tdap)                                 DTaP/Tdap/Td Vaccines   
(1 - Tdap)                              [facility]  
   
                                        Start: 09-   Screening for malign  
ant   
neoplasm of cervix                                  [facility]  
   
                          Start: 09- Hepatitis C screening Hepatitis C [facility]  
   
                          Start: 1969 HIV screening HIV Screening [facility]  
   
                          Start: 1969 Lipid panel  Lipid Panel  [facility]  
   
                                        Start: 1969   Screening for malign  
ant   
neoplasm of colon                                   [facility]  
   
                          Start: 1969 Yearly Adult Physical Yearly Adult P  
hysical [facility]  
   
                                                            Bacteria identified   
in   
Unspecified specimen by   
Culture                                             [facility]  
   
                                                            Bacteria identified   
in   
Urine by Culture                        Urine culture   
Microbiology Routine   
Urine troubles Ordered:   
10/30/2024                              [facility]  
Work Phone:   
[phone]  
   
                                        Comment on above:   Ordered: 10/30/2024   
   
                                                            Comprehensive metabo  
lic   
2000 panel - Serum or   
Plasma                                              [facility]  
   
                                                            CT Abdomen and Pelvi  
s W   
contrast IV                                         [facility]  
   
                                                            CT Chest WO and W   
contrast IV                                         [facility]  
   
                                                            IGP, APT HPV,RFX   
16/18,45                                IGP, APT HPV,RFX   
16/18,45 Lab Routine   
Screening for malignant   
neoplasm of cervix   
Screening for HPV   
(human papillomavirus)   
Ordered: 2025                     [facility]  
Work Phone:   
[phone]  
   
                                        Comment on above:   Ordered: 2025   
   
                                                Patient Education Sacroiliac Juli  
nt Pain   
(DC)                                    [facility]  
Work Phone:   
[phone]  
   
                                       Patient referral              [facility]  
Work Phone:   
[phone]  
   
                                                            XR Lumbar spine 2 or  
 3   
Views                                               [facility]  
   
                                                            Yersinia sp identifi  
ed   
in Unspecified specimen   
by Organism specific   
culture                                             [facility]  
  
  
  
Immunizations  
  
  
                      Immunization Date Immunization Notes      Care Provider Fa  
robinty  
   
                                        10-          influenza, injectabl  
e,   
quadrivalent,   
preservative free                                   Brittney E Meza  
Work Phone:   
[phone]                          [facility] 600   
DO  
Work Phone:   
[phone]  
   
                                        Comment on above:   Series:   
   
                                        10-          influenza, seasonal,  
   
injectable                                          Bree Visci DO  
Work Phone:   
[phone]                          [facility]  
   
                                        10-          influenza virus vacc  
ine,   
unspecified formulation                             Bree Visci DO  
Work Phone:   
[phone]                          [facility]  
   
                                        10-          Moderna COVID-19 Vac  
cine   
100 MCG/0.5ML   
Intramuscular Suspension                            Brittney E Meza  
Work Phone:   
[phone]                          [facility] 600   
DO  
Work Phone:   
[phone]  
   
                                        2021          Moderna COVID-19 Vac  
cine   
100 MCG/0.5ML   
Intramuscular Suspension                            Brittney E Meza  
Work Phone:   
[phone]                          [facility] 600   
DO  
Work Phone:   
[phone]  
   
                                        2020          Moderna COVID-19 Vac  
cine   
100 MCG/0.5ML   
Intramuscular Suspension                            Brittney E Meza  
Work Phone:   
[phone]                          [facility] 600   
DO  
Work Phone:   
[phone]  
   
                                        10-          influenza, seasonal,  
   
injectable, preservative   
free                                                Brittney E Meza  
Work Phone:   
[phone]                          [facility] 600   
DO  
Work Phone:   
[phone]  
   
                                        10-          influenza, seasonal,  
   
injectable                                          Brittney Meza  
Work Phone:   
[phone]                          [facility]  
   
                                        Comment on above:   Series:   
   
                                        10-          novel influenza-H1N1  
-09,   
preservative-free,   
injectable                                          Brittney Meza  
Work Phone:   
[phone]                          [facility]   
DO  
Work Phone:   
[phone]  
   
                                        2007          hepatitis A vaccine,  
   
pediatric/adolescent   
dosage, 2 dose schedule                             Brittney BARRON Meza  
Work Phone:   
[phone]                          [facility]   
DO  
Work Phone:   
[phone]  
   
                                        2007          hepatitis B vaccine,  
   
adult dosage                                        Brittney BARRON Meza  
Work Phone:   
[phone]                          [facility]   
DO  
Work Phone:   
[phone]  
   
                                        2007          meningococcal   
polysaccharide vaccine   
(MPSV4)                                             Brittney BEATRICE Meza  
Work Phone:   
[phone]                          [facility]   
DO  
Work Phone:   
[phone]  
   
                                        2007          typhoid vaccine, kay  
e,   
oral                                                Brittney BARRON Meza  
Work Phone:   
[phone]                          [facility]   
DO  
Work Phone:   
[phone]  
   
                          2007   yellow fever vaccine              Brittney BARRON Meza  
Work Phone:   
[phone]                          [facility]   
DO  
Work Phone:   
[phone]  
   
                                        2007          hepatitis B vaccine,  
   
adult dosage                                        Brittney BARRON Meza  
Work Phone:   
[phone]                          [facility]   
DO  
Work Phone:   
[phone]  
   
                                        2007          hepatitis A vaccine,  
   
pediatric/adolescent   
dosage, 2 dose schedule                             Brittney E Meza  
Work Phone:   
[phone]                          [facility]   
DO  
Work Phone:   
[phone]  
   
                                        2007          hepatitis B vaccine,  
   
adult dosage                                        Brittney E Meza  
Work Phone:   
[phone]                          [facility]   
DO  
Work Phone:   
[phone]  
  
  
  
Payers  
  
  
                          Date         Payer Category Payer        Policy ID  
   
                          2024   Self-pay                  [id number]  
[id number]  
[id number]  
   
                                2023      [facility]  
er Subscriber   
Plan / Payer (Effective   
2023-Present) Name:   
Kathi Self Member ID:   
[id number] Relation to   
Subscriber: Self Name:   
Kathi Self Subscriber   
ID: [id number] Payer   
ID: Not on file Group ID:   
[id number] Type: Not on   
file Phone: [phone]   
Address: [facility] [facility] [id number]   
[place] [address]                  [id number]  
[id number]  
   
                          2023   Unknown                     
   
                          2023   Unknown                   [id number]  
   
                          2020   Unknown                   [id number]  
   
                          1970   Unknown                   [id number]   
[id number]  
593  
   
                          1970   Unknown                   [id number]   
[id number]  
593  
   
                          1970   Unknown                   [id number]   
[id number]  
356  
   
                          1970   Unknown                   [id number]   
[id number]  
356  
   
                          1970   Unknown                   [id number]   
[id number]  
[id number]  
   
                          1970   Unknown                   [id number]   
[id number]  
[id number]  
   
                          1970   Unknown                   [id number]   
[id number]  
9  
   
                          1970   Unknown                   [id number]   
[id number]  
[id number]  
   
                          1970   Unknown                   [id number]   
[id number]  
1259  
   
                          1970   Unknown                   [id number]   
[id number]  
1259  
   
                          1970   Unknown                   [id number]   
[id number]  
[id number]  
   
                          1970   Unknown                   [id number]   
[id number]  
[id number]  
   
                          1961   Self-pay                  [id number]  
   
                                       Unknown                   [id number]  
   
                                       Unknown                   [id number]   
[id number]  
593  
   
                                       Unknown                   [id number]  
   
                                       Unknown                   [id number]   
[id number]  
531  
   
                                       Unknown                   [id number]   
[id number]  
531  
   
                                       Unknown                   [id number]   
[id number]  
531  
   
                                       Unknown                   [id number]   
[id number]  
531  
   
                                       Unknown                   [id number]   
[id number]  
531  
   
                                       Unknown                   [id number]   
[id number]  
531  
   
                                       Unknown                   [id number]   
[id number]  
531  
  
  
  
Social History  
  
  
                          Date         Type         Detail       Facility  
   
                                                    Start: 2023  
End: 10-     No illicit drug use No illicit drug use [facility]  
Work Phone:   
[phone]  
   
                                        Comment on above:   2 cups coffee daily,  
 couple sodas daily;   
   
                                                            social;   
   
                                                            quit 2019;   
   
                                                    Start: 2020  
End: 2024                         Tobacco smoking   
status [facility]               Smoker (finding)          [facility]  
   
                          Start: 1970 Sex Assigned At Birth Female       F  
[facility]  
   
                                                    Start: 10-  
End: 2023     Sex Assigned At Birth                     [place]   
[employer]  
Other Phone:   
[phone]  
   
                                        Start: 10-   Tobacco smoking   
status NHIS               Ex-smoker                 [facility]  
   
                                                      
End: 2020                         History of tobacco   
use                       Cigarette Smoker          [facility]  
   
                                                    Start: 2023  
End: 10-                         Tobacco use and   
exposure                                Smokeless tobacco   
non-user                                [facility]  
   
                                        Start: 10-   Alcoholic beverage   
intake                                  Current drinker of   
alcohol (finding)                       [facility]  
Work Phone:   
[phone]  
   
                          Start: 10- Alcohol Comment socially     [facility]  
[facility]  
Work Phone:   
[phone]  
   
                          Start: 1970 Sex assigned at birth Not on file  [facility]  
[facility]  
   
                                                    Start: 2024  
End: 10-                         Exposure to   
SARS-CoV-2 (event)        Not sure                  [facility]  
   
                                        Start: 2023   Tobacco smoking   
status NHIS               Smokes tobacco daily      [facility]  
   
                                                    Start: 2024  
End: 2025                         Alcoholic beverage   
intake                    Ex-drinker (finding)      [facility]  
   
                                                            How often to you hav  
e   
a drink containing   
alcohol?                  Monthly or less           [facility]  
   
                                                            How many standard   
drinks containing   
alcohol do you have   
on a typical day?         1 or 2                    [facility]  
   
                                                            How often do you hav  
e   
6 or more drinks on 1   
occasion?                 Never                     [facility]  
   
                                Start: 2023 Tobacco Comment > Other tobacc  
o use:   
Yes, Vape                               [facility]  
   
                                Start: 2023 Alcohol Comment Caffeine intak  
e: 1-2   
cups per day                            [facility]  
   
                                Start: 03- Gender identity Identifies as   
female   
gender (finding)                        [facility]  
   
                                                    Start: 2024  
End: 2025     Sex                 Female (finding)    [facility]  
   
                                                    NEGATED: Highlighted   
row                                     pregnant            [facility]  
  
  
  
Clinical Notes 2023 to 2025  
                  Bree Marcano, DO - 2025 2:15 PM EST  
  
                                Note Date & Type Note            Facility  
   
                                                    2025 History of Presen  
t   
illness Narrative                       Formatting of this note is different   
from the original.  
Images from the original note were   
not included.  
  
Bree Marcano,  
Obstetrics and Gynecology Kathi Self   
1970  
[phone]  
  
  
  
  
25  
[id number]  
Yearly Wellness Exam  
  
Chief Complaint  
Patient presents with  
Gynecologic Exam  
Pt presents for pap/yearly. Denies   
breast,bowel,bladder. Having dryness   
with intercourse. CT scan showed   
fibroids in her uterus she would like   
to discuss.  
  
Visit Vitals  
/70  
Wt 123 lb  
BMI 21.62 kg/m  
OB Status Postmenopausal  
Smoking Status Every Day  
BSA 1.58 m  
  
  
  
History of Present Illness  
The patient presents for a yearly   
checkup.  
  
She has been experiencing significant   
weight loss, approximately 20 pounds   
since 2024, without any   
identifiable cause. She attributes   
this weight loss to stress and   
fatigue from her job. She reports   
overall body pain and perceives   
changes in her body, describing it as   
feeling tight. She is not   
experiencing hot flashes but reports   
night sweats. She has undergone blood   
work, which showed normal thyroid   
function. She has completed her   
mammogram and colonoscopy screenings.  
  
She reports vaginal discomfort and is   
concerned about potential uterine   
fibroids. She is postmenopausal and   
does not experience menstrual   
periods. She has been experiencing   
vaginal dryness, which has led to   
irritation during sexual intercourse   
with her . Despite attempts to   
alleviate the symptoms with super   
condoms and over-the-counter   
products, the issue persists. She has   
not used any estrogen creams or   
tablets for vaginal application. She   
reports no bladder issues.  
  
She experienced a severe yeast   
infection in 2024, which she   
believes was triggered by sexual   
activity. She has noticed a   
recurrence of the yeast infection,   
although less severe than the   
previous episode. She completed a   
5-day treatment course for the yeast   
infection, which resolved the issue.  
  
Supplemental Information  
She is scheduled to see an ENT   
specialist this week.  
  
MEDICATIONS  
Current: estrogen pills  
  
  
Current Outpatient Medications  
Medication Sig Dispense Refill  
ALPRAZolam (Xanax) 0.25 MG tablet   
TAKE 1 TABLET ORALLY TWICE DAILY AS   
NEEDED FOR ANXIETY FOR 30 DAYS   
estradiol-norethindrone (ActivElla)   
1-0.5 MG tablet Take 1 tablet by   
mouth Daily 30 tablet 11  
famotidine (Pepcid) 40 MG tablet Take   
40 mg by mouth at bedtime  
fluconazole (Diflucan) 150 MG tablet   
1 tab now-repeat in 4 days then take   
1 pill weekly for 8 weeks 10 tablet 0  
nystatin (Mycostatin) ointment Apply   
topically 2 (two) times a day Thin   
amount in combination with   
Triamcinolone equal parts 30 g 0  
OMEPRAZOLE PO  
ondansetron ODT (Zofran-ODT) 4 MG   
disintegrating tablet Q8H  
sulfamethoxazole-trimethoprim   
(Bactrim DS) 800-160 MG per tablet 1   
tablet  
triamcinolone (Kenalog) 0.1 %   
ointment Apply topically 2 (two)   
times a day 30 g 0  
  
No current facility-administered   
medications for this visit.  
  
Allergies  
Allergen Reactions  
Acetaminophen Hives  
Other Reaction(s): Hives  
Propoxyphene Hives  
Other Reaction(s): Hives  
  
Past Medical History:  
Diagnosis Date  
Cervical lesion  
Relies on partner vasectomy for   
contraception  
Spontaneous pneumothorax   
Dr. Simons  
  
Past Surgical History:  
Procedure Laterality Date  
CHEST TUBE INSERTION  
Lung collapse (Pneumothorax)  
COLONOSCOPY 2020  
Mild sigmoid diverticulosis - [facility] -   
repeat 10 years  
COLPOSCOPY   
Colpo with biopsy -Cervical lesion  
DILATION AND CURETTAGE OF UTERUS   
THORACOSCOPY W/ TALC PLEURODESIS   
2008  
TONSILLECTOMY   
  
OB History  
 Para Term  AB Living  
4 4 4 4  
SAB IAB Ectopic Multiple Live Births  
  
  
# Outcome Date GA Lbr Josué/2nd Weight   
Sex Type Anes PTL Lv  
4 Term  
3 Term  
2 Term  
1 Term  
  
Obstetric Comments  
Pap 21- Neg, HPV Neg  
Mammogram 1/3/24- Neg  
Colonoscopy 2020- Neg, repeat 10   
years ([facility])  
  
  
  
ROS  
General: Denies fevers/chills  
Eyes: Denies vision changes  
ENT: Denies neck stiffness, neck mass  
Endocrine: Denies polydipsia and   
polyuria  
Respiratory: Denies shortness of   
breath  
Cardiovascular: Denies chest pain and   
palpitations  
Gastrointestinal: Denies changes in   
bowel habits, blood in stool,   
constipation and diarrhea.  
Hematology: Denies easy bruising.  
Women Only: Denies breast masses,   
skin changes, nipple discharge,   
abnormal bleeding, pelvic pain and   
dyspareunia  
Genitourinary: Denies dysuria, pelvic   
pain and nocturia  
Skin: Denies rashes/lesions  
Neurologic: Denies headaches,   
dizziness, syncope  
Psychiatric: Denies hallucinations,   
suicidal ideas  
  
EXAM  
GENERAL EXAMINATION: Alert, oriented,   
well developed, well nourished.  
HEAD: Normocephalic, atraumatic.  
EYES: ERICK, sclera anicteric.  
EARS: No obvious hearing deficit.  
NECK/THYROID: Neck supple no cervical   
lymphadenopathy no thyromegaly.  
LYMPH NODES: No axillary,   
supraclavicular or inguinal   
adenopathy.  
SKIN: Warm and dry. No rashes  
HEART: Regular rate and rhythm. No   
murmur  
LUNGS: Clear to auscultation   
bilaterally.  
CHEST: Axillary nodes grossly normal.  
BREASTS: No dominant masses palpable   
bilaterally, no skin changes, nipple   
discharge, supra-clavicular or   
axillary adenopathy  
ABDOMEN: Soft, nontender,   
nondistended, no hernia or masses   
palpable.  
BACK: No obvious scoliosis/kyphosis.  
FEMALE GENITOURINARY: EFG without   
sores/lesions, erythematous vaginal   
mucosa-no discharge, no evidence of   
atrophy, cervix without lesions,   
uterus RV, upper normal size, no   
adnexal masses, cul-de-sac negative.  
EXTREMITIES No edema.  
NEUROLOGIC: Alert and oriented.  
PSYCH: Cooperative with exam.  
  
ICD-10-CM  
1. Encounter for gynecological   
examination with abnormal finding   
Z01.411  
  
2. Encounter for screening mammogram   
for malignant neoplasm of breast   
Z12.31 Bilateral screening mammogram   
with tomosynthesis  
  
3. Screening for malignant neoplasm   
of cervix Z12.4 IGP, APT HPV,RFX   
16/18,45  
  
4. Screening for HPV (human   
papillomavirus) Z11.51 IGP, APT   
HPV,RFX 16/18,45  
  
5. Postmenopausal HRT (hormone   
replacement therapy) Z79.890   
estradiol-norethindrone (ActivElla)   
1-0.5 MG tablet  
  
6. Vaginal irritation N89.8 POCT   
trichomonas manually resulted  
  
7. Dyspareunia in female N94.10 POCT   
trichomonas manually resulted  
  
8. Vaginal yeast infection B37.31   
fluconazole (Diflucan) 150 MG tablet  
POCT trichomonas manually resulted  
  
  
Assessment & Plan  
1. Uterine fibroids.  
The patient has a 5 cm fibroid, which   
is causing some discomfort. However,   
the uterus itself does not seem to be   
overly enlarged. The fibroid is not   
causing significant symptoms such as   
bleeding or bulk symptoms. No   
immediate intervention is required.  
  
2. Vaginal dryness.  
The patient reports vaginal dryness   
and irritation, especially during   
intercourse. She is currently on   
estrogen pills. She is advised to use   
Astroglide, an over-the-counter   
lubricant, for additional relief. I   
think some of her discomfort might be   
from the yeast infection we   
identified today. Since she seems to   
be getting them frequently we are   
going to put her on a prolonged   
treatment course.  
  
3. Recurrent yeast infections.  
The patient has experienced recurrent   
yeast infections, with the most   
recent one occurring in December. A   
microscopic examination confirmed the   
presence of yeast. A prescription for   
Diflucan (fluconazole) tablets will   
be provided. She is instructed to   
take one tablet now, repeat it in 3   
to 4 days, and then continue with one   
tablet weekly for the next 8 weeks.  
  
4. Thyroid nodules.  
The patient has thyroid nodules, with   
the largest being 11 mm. Previous   
blood work indicated normal thyroid   
function. No immediate intervention   
is required, but follow-up with an   
ultrasound may be necessary to   
monitor the nodules.  
  
5. Health maintenance.  
A Pap smear will be conducted today.   
She has already completed her   
mammogram and colonoscopy screenings.  
  
PROCEDURE  
Colonoscopy was performed in .  
  
  
  
Electronically signed by Bree Marcano DO at 2025 6:50 PM EST  
documented in this encounter            [facility]  
  
  
  
                                                    2024 Evaluation note   
  
  
  
                                Diagnosis       Onset Date      Resolution  
   
                      Anxiety                          acute      2024 10:54am  
  
  
[facility]  
Work Phone: [phone]- [facility] [place] [address]  
  
Ultrasound Report  
Signed  
  
Patient: Kathi Self MR#: [id number]  
80  
: 1970 Acct:[id number]  
  
Age/Sex: 54 / F ADM Date:   
4  
Loc:  Room: Type: [facility]  
Attending Dr: Brittney Meza MD  
  
Ordering Provider: Brittney Meza MD  
Date of Service: 24  
Accession #: ([id number] US/US thyroid: E04.1 - Nontoxic single thyroid nodule  
  
  
Copies to: Brittney Meza MD~  
  
  
Thyroid Ultrasound  
  
HISTORY: Nontoxic thyroid nodule  
  
COMPARISON: None  
  
The RIGHT lobe measures 4.7 x 1.4 x 1.6cm.  
  
LEFT lobe measures 4.2 x 1.1 x 1.4 cm.  
  
Isthmus has an AP dimension of 0.1cm.  
  
Right mid solid cystic nodule measures up to 11 mm. Left mid medial solid cystic
 nodule measures upto 5 mm. Left superior anechoic nodule with septationmeasures
 up to 6 mm. Left superior anechoic nodule with septation measures up to 4 mm. 
Right portion of the isthmus contains a mixed echogenic nodule measuring up to 7
 mm..  
  
No microcalcifications identified.  
  
Symmetric blood flow of the thyroid gland identified.  
  
  
ORDER #: [id number] US/US thyroid  
IMPRESSION: Bilateral thyroid nodules measuring up to 11 mm.  
  
Impression dictated by: Zach Le M.D.2024 4:37 PM  
  
  
Dictation Location: [place] [address]  
  
Tech: Jo Watkins  
  
Transcribed By: COLBY 24 1637  
Dictated By: Zach Le DO 24 1636  
  
Signed By:  
24 1637  
[facility]11- Radiology Diagnostic study note
[facility] [place] [address]  
  
CT Scan Report  
Signed  
  
Patient: Kathi Self MR#: [id number]  
80  
: 1970 Acct:[id number]  
  
Age/Sex: 54 / F ADM Date:   
4  
Loc: CT Room: Type: [facility]  
Attending Dr: Brittney Meza MD  
Copies to: Brittney Meza MD~  
  
  
  
Ordering Provider: Brittney Meza MD  
Date of Service: 24  
Accession #: ([id number] CT/CT abdomen pelvis w con: R07.9 - Chest pain, 
unspecified  
([id number]) CT/CT chest wo/w con: R07.9 - Chest pain, unspecified  
  
  
  
  
CT chest wo/w con, CT abdomen pelvis w con 2024 4:50 PM  
  
SIGN AND SYMPTOMS: Bilateral lower rib pain, generalized abdominal pain, weight 
loss, hematuria  
  
CONTRAST: 90 mL of intravenous Isovue-300  
  
TECHNIQUE: Multidetector CT axial slices of the chest, abdomen and pelvis were 
obtainedwith and without IV contrast. Multiplanar reformats were performed and 
viewed on a separate workstation and reviewed to further define anatomy and 
possible pathology. CT was performed with one or more of the following dose 
reduction techniques: Automated exposure control, adjustment of the mA and/or kV
 according to patient size, or use of iterative reconstruction technique.  
  
COMPARISON: 2008.  
  
FINDINGS:  
Lower neck: There is an 11 mm hypoattenuating structure in the right thyroid 
lobe which appears to be new when compared to the prior exam. Nonemergent 
ultrasound follow-up is recommended as malignancy is not excluded.  
Vessels: Within normal limits. There is no evidence of pulmonary embolism.  
Mediastinum and Marie: Within normal limits.  
Heart: Normal size. No pericardial effusion.  
Airways: Within normal limits  
Lungs: Emphysematous changes are present in the lung parenchyma. There is 
scarring at the lung apices. There is pleural-based scarring at the left lung 
base.  
Pleura: Within normal limits.  
Chest Wall: Within normal limits.  
  
Abdomen:  
Liver: There is an 8 mm focus of hypoattenuation in the right hepatic lobe.  
Bile Ducts: Normal caliber.  
Gallbladder: No calcified gallstones. Normal caliber wall.  
Pancreas: within normal limits.  
Spleen: within normal limits.  
Adrenals: within normal limits.  
Kidneys: within normal limits.  
  
Pelvis:  
Reproductive Organs: The uterine fundus is heterogeneously enlarged presumably 
relating to uterine fibroids. The largest measures 5.6 cm in greatest transverse
 dimension.  
Ureters: within normal limits.  
Bladder: within normal limits.  
  
Bowel: There are uncomplicated colonic diverticula.  
Mesenteric Lymph Nodes: No enlarged mesenteric lymph nodes.  
Peritoneum: No ascites or free air, no fluid collection.  
Vessels: Atherosclerotic changes are noted in the abdominal aorta and its 
branches..  
Retroperitoneum: within normal limits.  
Abdominal Wall: within normal limits.  
Bones: Bilateral L5 pars defects are noted with grade 1 spondylolisthesis of L5 
upon S1.  
  
  
  
ORDER #: [id number] CT/CT chest wo/w con  
IMPRESSION:  
  
No acute cardiopulmonary pathology or intra-abdominal pathology.  
  
There is an 11 mm hypoattenuating structure in the right thyroid lobe which 
appears to be new when compared to the prior exam. Nonemergent ultrasound 
follow-up is recommended as malignancy is not excluded.  
  
Emphysematous changes are present in the lung parenchyma.  
  
There is an 8 mm suspected cyst or hemangioma in the right hepatic lobe.  
  
The uterine fundus is heterogeneously enlarged presumably relating to uterine 
fibroids. The largestmeasures 5.6 cm in greatest transverse dimension.  
  
Uncomplicated colonic diverticula are noted.  
  
Impression dictated by: Aguila Phillip M.D.2024 7:56 PM  
  
  
Dictation Location: [place] [address]  
  
  
  
Transcribed By: [facility]  
Dictated By: Aguila Phillip II, MD 24  
  
Signed By:  
24  
[facility]  
Work Phone: [phone]- History of Present illness Narrative* ARIANNA Ellis - 10/30/2024 10:20 AM EDTFormatting of this note is different from 
  the original.  
  
Reason for Appointment:  
  
Patient ID: Kathi Self is a 54 y.o. female who presents for Blood in Urine (Pt 
present today for urinary issues. Pt states she has blood and leuks in her 
urine. Pt had pain in urethra and very dark urine. Pt states she has lost 15 
pounds since August. Wants to discuss a referral to Urology. )  
  
Patient presents today for Acute Visit. Consistent urinary issues.  
  
MEDICATIONS  
Current Outpatient Medications  
Medication Instructions  
acetaminophen-codeine (Tylenol w/ Codeine #3) 300-30 MG tablet 1 tablet, Oral, 3
 times daily PRN  
ALPRAZolam (Xanax) 0.25 MG tablet TAKE 1 TABLET ORALLY TWICE DAILY AS NEEDED FOR
 ANXIETY FOR 30 DAYS  
cyclobenzaprine (Flexeril) 10 MG tablet  
estradiol-norethindrone (ActivElla) 1-0.5 MG tablet 1 tablet, Oral, Daily  
famotidine (PEPCID) 40 mg, Nightly  
nystatin (Mycostatin) ointment Topical, 2 times daily, Thin amount in 
combination with Triamcinolone equal parts  
OMEPRAZOLE PO  
ondansetron ODT (Zofran-ODT) 4 MG disintegrating tablet Q8H  
sulfamethoxazole-trimethoprim (Bactrim DS) 800-160 MG per tablet 1 tablet  
triamcinolone (Kenalog) 0.1 % ointment Topical, 2 times daily  
  
  
ALLERGIES  
Allergies  
Allergen Reactions  
Acetaminophen Hives  
Other Reaction(s): Hives  
Propoxyphene Hives  
Other Reaction(s): Hives  
  
  
  
PROBLEMS  
  
Active Ambulatory Problems  
Diagnosis Date Noted  
Anxiety 2010  
SI (sacroiliac) pain 2024  
Smoking 2024  
  
Resolved Ambulatory Problems  
Diagnosis Date Noted  
No Resolved Ambulatory Problems  
  
Past Medical History:  
Diagnosis Date  
Cervical lesion  
Relies on partner vasectomy for contraception  
Spontaneous pneumothorax 2009  
  
  
HISTORY  
  
PAST MEDICAL HISTORY SOCIAL HISTORY  
Past Medical History:  
Diagnosis Date  
Cervical lesion  
Relies on partner vasectomy for contraception  
Spontaneous pneumothorax 2009  
Dr. Simons  
Social History  
  
Tobacco Use  
Smoking status: Every Day  
Types: Cigarettes  
Smokeless tobacco: Never  
Tobacco comments:  
> Other tobacco use: Yes, Vape  
Vaping Use  
Vaping status: Never Used  
Substance Use Topics  
Alcohol use: Not Currently  
Comment: Caffeine intake: 1-2 cups per day  
Drug use: Never  
  
FAMILY HISTORY  
Family History  
Problem Relation Name Age of Onset  
Heart disease Maternal Grandmother  
Breast cancer Paternal Grandmother 80  
  
  
SURGICAL HISTORY  
Past Surgical History:  
Procedure Laterality Date  
CHEST TUBE INSERTION  
Lung collapse (Pneumothorax)  
COLONOSCOPY 2020  
Mild sigmoid diverticulosis - [facility] - repeat 10 years  
COLPOSCOPY 2010  
Colpo with biopsy -Cervical lesion  
DILATION AND CURETTAGE OF UTERUS   
THORACOSCOPY W/ TALC PLEURODESIS 2008  
TONSILLECTOMY   
  
REVIEW OF SYSTEMS  
Review of Systems:  
Review of Systems  
Constitutional: Negative.  
HENT: Negative.  
Eyes: Negative.  
Respiratory: Negative.  
Cardiovascular: Negative.  
Gastrointestinal: Negative.  
Genitourinary: Negative.  
Musculoskeletal: Negative.  
Skin: Negative.  
Neurological: Negative.  
All other systems reviewed and are negative.  
Hematological: Negative.  
Endocrine: Negative.  
Allergic/Immunologic: Negative.  
  
  
OBJECTIVE  
  
Objective:  
Physical Exam  
Constitutional:  
Appearance: Normal appearance. She is normal weight.  
HENT:  
Head: Normocephalic.  
Cardiovascular:  
Rate and Rhythm: Normal rate.  
Pulses: Normal pulses.  
Pulmonary:  
Effort: Pulmonary effort is normal.  
Breath sounds: Normal breath sounds.  
Abdominal:  
Palpations: Abdomen is soft.  
Musculoskeletal:  
General: Normal range of motion.  
Neurological:  
General: No focal deficit present.  
Mental Status: She is alert and oriented to person, place, and time.  
Psychiatric:  
Mood and Affect: Mood normal.  
Behavior: Behavior normal.  
Thought Content: Thought content normal.  
Judgment: Judgment normal.  
Vitals and nursing note reviewed.  
  
  
  
Vitals:  
Estimated body mass index is 21.97 kg/m as calculated from the following:  
Height as of 2/15/23: 5' 3.25 .  
Weight as of this encounter: 125 lb.  
BP: 140/80  
No LMP recorded. Patient is postmenopausal.  
  
ASSESSMENT & PLAN  
  
ICD-10-CM  
1. Urine troubles R39.89 POCT urinalysis dipstick manually resulted  
Urine culture  
  
Pt states she had pain in the urethra a few days ago and once she urinated pt 
states she seen a form of a parasite. Pt has lost 15 pounds since August and has
 had an abnormal stool. Pt desires to have her urine sent out for cx. Urine was 
sent out at today's visit.  
Patient had been on a camping trip recently and symptoms started shortly after 
trip. Pt states not the cleanist of camping trips and feels she could have 
picked something up from wood. She describes passing of a parasite or worm like 
substance in her urine. We will send for stool culture as she hasalso had 
diarrhea. We will refer to urology to DR Hernandez per patient request.  
  
Documented by Ratna Roblero MA on behalf of: ARIANNA Ellis  
Electronically signed by ARIANNA Ellis at 10/31/2024 9:37 AM EDT  
  
documented in this encounterUniversity Health Lakewood Medical [id number]- History of Present illness
 Narrative* Roque Spears MD - 10/09/2024 2:50 PM EDTFormatting of this 
  note is different from the original.  
Subjective  
Kathi Self is a 54 y.o. female  
  
Chief Complaint  
Annual Exam  
  
  
  
HPI  
  
Patient is here for follow-up continue management for previous evaluation for 
atypical chest pain and hyperlipidemia. Since last time I saw her she admit to 
very high level of anxiety. 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 
future of that hospital remains uncertain. The patient denies complaint of chest
 pain recently denies lightheadedness, dizziness or syncope. She remains active.  
  
ASSESSMENT:  
  
1. Atypical chest pain appears musculoskeletal or pleuritic the patient had a 
remote history of resection of lung bleb. Recent stress test is reassuring. 
Patient report complete resolution of her symptoms with after her period: 
Calcium scoring with her stopped  
2. reformed smoker  
3. hyperlipidemia. Does not meet criteria for treatment recent lab work noted 
and reviewed with her  
4. minor shortness breath related to prior tobacco use functional class I with 
excellent exercise tolerance  
5. Documentation of few premature ventricular contractions, felt to be benign. 
In the past with no recurrence  
  
RECOMMENDATION:  
  
1. Reviewed the results of her previous cardiac workup including stress test and
 calcium scoring  
2. I recommended continue with observant approach discussed with her risk factor
 modification  
3. I discussed with her risk factor modification  
4. I advised her to notify me with changes of cardiac status or symptoms I will 
see her back in 1 year  
Review of Systems  
All other systems reviewed and are negative.  
  
  
Vitals:  
10/09/24 1522 10/09/24 1552  
BP: 146/86 131/82  
BP Location: Left arm  
Patient Position: Sitting  
Pulse: 84  
Weight: 57.9 kg (127 lb 9.6 oz)  
Height: 1.6 m (5' 3 )  
  
  
Objective  
Physical Exam  
Constitutional:  
Appearance: Normal appearance.  
HENT:  
Nose: Nose normal.  
Neck:  
Vascular: No carotid bruit.  
Cardiovascular:  
Rate and Rhythm: Normal rate.  
Pulses: Normal pulses.  
Heart sounds: Normal heart sounds.  
Pulmonary:  
Effort: Pulmonary effort is normal.  
Abdominal:  
General: Bowel sounds are normal.  
Palpations: Abdomen is soft.  
Musculoskeletal:  
General: Normal range of motion.  
Cervical back: Normal range of motion.  
Right lower leg: No edema.  
Left lower leg: No edema.  
Skin:  
General: Skin is warm and dry.  
Neurological:  
General: No focal deficit present.  
Mental Status: She is alert.  
Psychiatric:  
Mood and Affect: Mood normal.  
Behavior: Behavior normal.  
Thought Content: Thought content normal.  
Judgment: Judgment normal.  
  
Allergies  
Patient has no known allergies.  
  
Current Medications  
  
Current Outpatient Medications:  
acetaminophen-codeine (Tylenol w/ Codeine #3) 300-30 mg tablet, Take 1 tablet by
 mouth 3 times a day as needed for severe pain (7 - 10)., Disp: , Rfl:  
ALPRAZolam (Xanax) 0.25 mg tablet, Take 1 tablet (0.25 mg) by mouth if needed 
for anxiety., Disp: ,Rfl:  
cyclobenzaprine (Flexeril) 10 mg tablet, Take 1 tablet (10 mg) by mouth if 
needed., Disp: , Rfl:  
famotidine (Pepcid) 20 mg tablet, Take 1 tablet (20 mg) by mouth once daily at 
bedtime., Disp: , Rfl:  
omeprazole (PriLOSEC) 20 mg DR capsule, Take 1 capsule (20 mg) by mouth once 
daily in the morning. Take before meals. Do not crush or chew., Disp: , Rfl:  
  
  
  
Assessment/Plan  
1. Chest pain, unspecified type  
  
2. Premature ventricular contractions Follow Up In Cardiology  
Follow Up In Cardiology  
  
3. Shortness of breath on exertion  
  
4. Hyperlipidemia, unspecified hyperlipidemia type  
  
5. Former smoker  
  
6. Body mass index (BMI) 22.0-22.9, adult  
  
  
  
Scribe Attestation  
By signing my name below, Maria Teresa PERALES LPN, Scribe  
attest that this documentation has been prepared under the direction and in the 
presence of MD Jin.  
  
Provider Attestation - Scribe documentation  
  
All medical record entries made by the Scribe were at my direction and 
personally dictated by me. Ihave reviewed the chart and agree that the record 
accurately reflects my personal performance of the history, physical exam, 
discussion and plan.  
  
Electronically signed by Roque Spears MD at 10/09/2024 3:55 PM EDT  
  
documented in this [facility]Met[facility]  
Work Phone: [phone]- Instructions* Patient Instructions*   
  
Maria Teresa Gillis LPN - 10/09/2024 2:50 PM EDT  
  
Formatting of this note might be different from the original.  
Please bring all medicines, vitamins, and herbal supplements with you when you 
come to the office.  
  
Prescriptions will not be filled unless you are compliant with your follow up 
appointments or have a follow up appointment scheduled as per instruction of 
your physician. Refills should be requested at the time of your visit.  
  
One year  
Same meds  
Electronically signed by Maria Teresa Gillis LPN at 10/09/2024 3:52 PM EDT  
  
  
  
documented in this encounterMet[facility]  
Work Phone: [phone]- Evaluation note*   
  
                      Diagnosis  Onset Date Resolution Status     Admit Date  
   
                      Anxiety                          acute       2:11pm  
   
                      Vaginal irritation                       acute      2024 2:11pm  
   
                      Urinary frequency                       acute      2024 11:36am  
   
                      Chest pain                       acute      2024 11:07am  
   
                      Generalized abdominal pain                       acute      
  2024 11:07am  
   
                      Nausea                           acute      2024 11:07am  
   
                      Pleurisy                         acute      2024 11:07am  
   
                      Weight loss, abnormal                       acute      Oct  
shasta 15th, 2024 11:07am  
  
  
[facility]  
Work Phone: [phone]- History of Present illness Narrative* ARIANNA Ellis - 2024 1:10 PM EDTFormatting of this note is different from 
  the original.  
  
Reason for Appointment:  
  
Patient ID: Kathi Self is a 54 y.o. female who presents for Vaginal Pain  
  
Patient presents today for Acute Visit.  
  
MEDICATIONS  
Current Outpatient Medications  
Medication Instructions  
acetaminophen-codeine (Tylenol w/ Codeine #3) 300-30 MG tablet 1 tablet, Oral, 3
 times daily PRN  
ALPRAZolam (Xanax) 0.25 MG tablet TAKE 1 TABLET ORALLY TWICE DAILY AS NEEDED FOR
 ANXIETY FOR 30 DAYS  
cyclobenzaprine (Flexeril) 10 MG tablet  
estradiol-norethindrone (ActivElla) 1-0.5 MG tablet 1 tablet, Oral, Daily  
nystatin (Mycostatin) ointment Topical, 2 times daily, Thin amount in 
combination with Triamcinolone equal parts  
OMEPRAZOLE PO  
triamcinolone (Kenalog) 0.1 % ointment Topical, 2 times daily  
  
  
ALLERGIES  
No Known Allergies  
  
  
PROBLEMS  
  
Active Ambulatory Problems  
Diagnosis Date Noted  
Anxiety 2010  
SI (sacroiliac) pain 2024  
Smoking 2024  
  
Resolved Ambulatory Problems  
Diagnosis Date Noted  
No Resolved Ambulatory Problems  
  
Past Medical History:  
Diagnosis Date  
Cervical lesion  
Relies on partner vasectomy for contraception  
Spontaneous pneumothorax 2009  
  
  
HISTORY  
  
PAST MEDICAL HISTORY SOCIAL HISTORY  
Past Medical History:  
Diagnosis Date  
Cervical lesion  
Relies on partner vasectomy for contraception  
Spontaneous pneumothorax 2009  
Dr. Simons  
Social History  
  
Tobacco Use  
Smoking status: Every Day  
Types: Cigarettes  
Smokeless tobacco: Never  
Tobacco comments:  
> Other tobacco use: Yes, Vape  
Vaping Use  
Vaping status: Never Used  
Substance Use Topics  
Alcohol use: Not Currently  
Comment: Caffeine intake: 1-2 cups per day  
Drug use: Never  
  
FAMILY HISTORY  
Family History  
Problem Relation Name Age of Onset  
Heart disease Maternal Grandmother  
Breast cancer Paternal Grandmother 80  
  
  
SURGICAL HISTORY  
Past Surgical History:  
Procedure Laterality Date  
CHEST TUBE INSERTION  
Lung collapse (Pneumothorax)  
COLONOSCOPY 2020  
Mild sigmoid diverticulosis - [facility] - repeat 10 years  
COLPOSCOPY   
Colpo with biopsy -Cervical lesion  
DILATION AND CURETTAGE OF UTERUS   
THORACOSCOPY W/ TALC PLEURODESIS 2008  
TONSILLECTOMY 1976  
  
REVIEW OF SYSTEMS  
Review of Systems:  
Review of Systems  
Constitutional: Negative.  
HENT: Negative.  
Eyes: Negative.  
Respiratory: Negative.  
Cardiovascular: Negative.  
Gastrointestinal: Negative.  
Genitourinary: Negative.  
Musculoskeletal: Negative.  
Skin: Negative.  
Neurological: Negative.  
All other systems reviewed and are negative.  
Hematological: Negative.  
Endocrine: Negative.  
Allergic/Immunologic: Negative.  
  
  
OBJECTIVE  
  
Objective:  
Physical Exam  
Constitutional:  
Appearance: Normal appearance. She is normal weight.  
Genitourinary:  
Left Labia: rash.  
  
Vulva exam comments: Small red raised excoriated region.  
HENT:  
Head: Normocephalic.  
Cardiovascular:  
Rate and Rhythm: Normal rate.  
Pulses: Normal pulses.  
Pulmonary:  
Effort: Pulmonary effort is normal.  
Breath sounds: Normal breath sounds.  
Abdominal:  
Palpations: Abdomen is soft.  
Musculoskeletal:  
General: Normal range of motion.  
Neurological:  
General: No focal deficit present.  
Mental Status: She is alert and oriented to person, place, and time.  
Psychiatric:  
Mood and Affect: Mood normal.  
Behavior: Behavior normal.  
Thought Content: Thought content normal.  
Judgment: Judgment normal.  
Vitals and nursing note reviewed.  
  
  
  
Vitals:  
Estimated body mass index is 22.39 kg/m as calculated from the following:  
Height as of 2/15/23: 5' 3.25 .  
Weight as of this encounter: 127 lb 6.4 oz.  
BP:  
No LMP recorded. Patient is postmenopausal.  
  
ASSESSMENT & PLAN  
  
ICD-10-CM  
1. Vaginal pain R10.2  
  
Patient presents for pain and rash to left labial area. Patient states she had 
been on a camping trip recently with her  and developed sore post camping
 trip. Uncertain if came into contact with something in the woods. She is 
monagamous with her  and no concern for sti. She was seen last week and 
started on cream and flagyl which have not seemed to help from another GYN. Exam
 today shows small painful lesion to the left labia, cultures obtained. Patient 
given a new prescription ofclobetasole and premarin cream. Patient will follow 
up for biopsy if symptoms do not improve  
  
Documented by ARIANNA Ellis on behalf of: ARIANNA Ellis  
Electronically signed by ARIANNA Ellis at 2024 2:59 PM EDT  
  
documented in this encounterUniversity [facility] Lakewood [facility] [id number]- History of Present illness
 Narrative* Bree Marcano,  - 2024 10:00 AM EDTFormatting of this note
   is different from the original.  
Images from the original note were not included.  
Subjective  
Kathi Self is a 54 y.o. female  
Chief Complaint  
Patient presents with  
Gynecologic Exam  
C/o redness and inflammation in vagina started 5w ago when she had a yeast 
infection- treated w/ OTC Monistat. States it went away, but returned, so she 
did another round of Monistat. Denies vaginal discharge, odor, itching. Sexually
 active, denies pain with intercourse, but admits she has been avoiding it. 
Thinks Bartholin Cyst.  
  
  
History of Present Illness  
  
Current Outpatient Medications:  
cyclobenzaprine (Flexeril) 10 MG tablet, , Disp: , Rfl:  
OMEPRAZOLE PO, , Disp: , Rfl:  
acetaminophen-codeine (Tylenol w/ Codeine #3) 300-30 MG tablet, Take 1 tablet by
 mouth 3 (three) times a day as needed, Disp: , Rfl:  
ALPRAZolam (Xanax) 0.25 MG tablet, TAKE 1 TABLET ORALLY TWICE DAILY AS NEEDED 
FOR ANXIETY FOR 30 DAYS, Disp: , Rfl:  
estradiol-norethindrone (ActivElla) 1-0.5 MG tablet, Take 1 tablet by mouth in 
the morning., Disp: 30 tablet, Rfl: 11  
  
Past Medical History:  
Diagnosis Date  
Cervical lesion  
Relies on partner vasectomy for contraception  
Spontaneous pneumothorax   
Dr. Simons  
  
  
Past Surgical History:  
Procedure Laterality Date  
CHEST TUBE INSERTION  
Lung collapse (Pneumothorax)  
COLONOSCOPY 2020  
Mild sigmoid diverticulosis - [facility] - repeat 10 years  
COLPOSCOPY   
Colpo with biopsy -Cervical lesion  
DILATION AND CURETTAGE OF UTERUS   
THORACOSCOPY W/ TALC PLEURODESIS 2008  
TONSILLECTOMY   
  
  
Family History  
Problem Relation Name Age of Onset  
Heart disease Maternal Grandmother  
Breast cancer Paternal Grandmother 80  
  
  
OB History  
 Para Term  AB Living  
4 4 4 0 0 4  
SAB IAB Ectopic Multiple Live Births  
0 0 0 0 0  
  
# Outcome Date GA Lbr Josué/2nd Weight Sex Type Anes PTL Lv  
4 Term  
3 Term  
2 Term  
1 Term  
  
Obstetric Comments  
Pap 21- Neg, HPV Neg  
Mammogram 1/ 3/24- Neg  
Colonoscopy 2020- Neg, repeat 10 years ([facility])  
  
  
  
Review of Systems  
All negative unless documented in treatment  
  
Objective  
Visit Vitals  
/86  
Wt 128 lb  
BMI 22.50 kg/m  
OB Status Postmenopausal  
Smoking Status Every Day  
BSA 1.61 m  
  
Allergies  
Allergen Reactions  
Acetaminophen Hives  
Propoxyphene Hives  
  
Physical Exam  
Constitutional:  
Appearance: Normal appearance.  
Genitourinary:  
Vulva, bladder, rectum and urethral meatus normal.  
Right Labia: No rash, tenderness, lesions, skin changes or Bartholin's cyst.  
Left Labia: No tenderness, lesions, skin changes, Bartholin's cyst or rash.  
Vulva exam comments: Small tear or fissure in skin on L.  
Vaginal discharge and erythema (Mild erythema right at the introitus) present.  
No vaginal tenderness or bleeding.  
No vaginal prolapse present.  
No vaginal atrophy present.  
  
Right Adnexa: not tender and no mass present.  
Left Adnexa: not tender and no mass present.  
Cervical nabothian cyst (fairly large 1.5-2 cm nabothian cyst encompassing the 
posterior aspect of the cervix.) present.  
No cervical motion tenderness or lesion.  
No parametrium nodularity or thickening present.  
Uterus is not fixed or tender.  
Uterus is anteverted.  
HENT:  
Head: Normocephalic and atraumatic.  
Musculoskeletal:  
Right lower leg: No edema.  
Left lower leg: No edema.  
Neurological:  
Mental Status: She is alert and oriented to person, place, and time.  
Skin:  
General: Skin is warm and dry.  
Findings: No rash.  
  
Procedures  
  
ICD-10-CM  
1. Vulvar itching L29.2  
  
She has been experiencing what she thought was yeast infections due to soreness 
and itching. She used Monistat which resolved symptoms. A week or so later she 
developed symptoms again, used Monistat again on Friday. She thinks she has some
 swelling on the Left and itching. She took Keflex last night and this morning. 
Denies any new medication, soaps, detergents etc. Discharge noted. Wet prep- Bv 
and yeast. Will rx Diflucan 2 tablets and Flagyl. She will take one Diflucan now
 complete Flagyl andthen take her second Diflucan. In the mean time she will use
 Nystatin/Triamcinolone thin amount twice daily externally and slowly wean. She 
will call office if symptoms do not improve.  
  
Entered by Jayla Adame LPN acting as scribe for Dr. Bree Marcano. 
Signature: Jayla Adame LPN  
The documentation recorded by the scribe accurately reflects the service(s) I 
personally performed and the decisions I made.  
Signature: Bree Marcano DO  
  
Assessment & Plan  
  
  
Electronically signed by Bree Marcano DO at 2024 8:01 AM EDT  
  
documented in this encounterUniversity Health Lakewood Medical [id number]- Evaluation note*   
  
                      Encounter Date Diagnosis  Assessment Notes Treatment Notes  
 Treatment   
Clinical Notes  
   
                                                Situational anxiety   
(ICD-10 - F41.8)                                              
  
  
[facility]  
Other Phone: [phone]- Evaluation note*   
  
                      Encounter Date Diagnosis  Assessment Notes Treatment Notes  
 Treatment   
Clinical Notes  
   
                                                Trapezius muscle   
spasm (ICD-10 -   
M62.838)                                            REquests refill   
of muscle relaxer  
                                          
   
                                                Acute intractable   
tension-type   
headache (ICD-10 -   
G44.201)                                            Has been to   
chiropractor.   
Suggested   
massotherapy,   
heat and topical   
NSAIDs for neck   
strain. Nurtec   
samples #2 given.  
                                          
  
  
[facility]  
Other Phone: [phone]- Evaluation note*   
  
                      Encounter Date Diagnosis  Assessment Notes Treatment Notes  
 Treatment Clinical   
Notes  
   
                                        15 Dec, 2023        Dysuria (ICD-10 -   
R30.0)                                                        
  
  
[facility]  
Other Phone: [phone]- Evaluation note*   
  
                      Encounter Date Diagnosis  Assessment Notes Treatment Notes  
 Treatment   
Clinical Notes  
   
                                        14 Dec, 2023        Situational anxiety   
(ICD-10 - F41.8)                                              
  
  
[facility]  
Other Phone: [phone]- Evaluation note*   
  
                      Encounter Date Diagnosis  Assessment Notes Treatment Notes  
 Treatment   
Clinical Notes  
   
                                                Situational anxiety   
(ICD-10 - F41.8)                                              
  
  
[facility]  
Other Phone: [phone]- Evaluation note*   
  
                      Encounter Date Diagnosis  Assessment Notes Treatment Notes  
 Treatment   
Clinical Notes  
   
                                        26 Sep, 2023        Tongue abnormality   
(ICD-10 - Q38.3)                                    Discussed burning   
tongue syndrome.   
Does have some   
photos of her   
swollen tongue.   
Wants to take   
prednisone prn.   
Requests referral   
to Dr. Pineda.  
Discussed adding B   
complex vitamin   
and she has read   
info that it could   
be worse w her   
HRT. She will hold   
her HRT as it is   
just for hot   
flashes.  
                                          
   
                                        26 Sep, 2023        Situational anxiety   
(ICD-10 - F41.8)                                    Chronic problem,   
prefers prn med -   
refilled rx and   
reviewed [facility]   
report  
                                          
  
  
[facility]  
Other Phone: [phone]- Evaluation note*   
  
                      Encounter Date Diagnosis  Assessment Notes Treatment Notes  
 Treatment   
Clinical Notes  
   
                                        05 Sep, 2023        Tongue abnormality   
(ICD-10 - Q38.3)                                              
  
  
[facility]  
Other Phone: [phone]- Evaluation note*   
  
                      Encounter Date Diagnosis  Assessment Notes Treatment Notes  
 Treatment   
Clinical Notes  
   
                                        17 Aug, 2023        Tongue abnormality   
(ICD-10 - Q38.3)                                              
  
  
[facility]  
Other Phone: [phone]- Evaluation note*   
  
                      Encounter Date Diagnosis  Assessment Notes Treatment Notes  
 Treatment   
Clinical Notes  
   
                                        16 Aug, 2023        Wellness   
examination   
(ICD-10 - Z00.00)                                   Pt requests order   
for wellness labs.   
Today was not a   
wellness exam  
                                          
   
                                        16 Aug, 2023        Tongue abnormality   
(ICD-10 - Q38.3)                                    Discussed   
differential -   
thrush, burning   
tongue syndrome.   
Recommend followup   
w dentist -has appt   
in 2 weeks. Will   
treat presently and   
monitor symptoms.  
                                          
  
  
[facility]  
Other Phone: [phone]- Evaluation note*   
  
                      Encounter Date Diagnosis  Assessment Notes Treatment Notes  
 Treatment   
Clinical Notes  
   
                                        02 Aug, 2023        Candidal   
stomatitis   
(ICD-10 - B37.0)                                    Use medication as   
directed. Change   
toothbrush.   
Nystatin, 4 ml to   
each swish and   
swallow QID x14   
days. P atient   
verbalizes   
understanding and is   
agreeable to   
treatment   
plan.Follow up with   
PCP if symptoms do   
not improve with   
treatment  
                                          
  
  
[facility]  
Other Phone: [phone] complaint Narrative - Reported* KATHI SELF is 
  being seen for a cardiovascular evaluation.  
* KATHI SELF is being seen for chest pain.  
[facility]  
Work Phone: [phone] [id number] complaint Narrative - Reported* KATHI SELF is 
  being seen for a cardiovascular evaluation.  
* KATHI SELF is being seen for chest pain.  
[facility]  
Work Phone: [phone] noteNo assessment information available
[facility]  
Work Phone: [phone] noteNo InformationNortEle[facility]  
Other Phone: [phone] note*   
  
                          Diagnosis    Onset Date   Resolution   Status  
   
                          Anxiety                                acute  
  
  
[facility]  
Work Phone: [phone] note*   
  
                          Diagnosis    Onset Date   Resolution   Status  
   
                          Anxiety                                acute  
   
                          Anxiety                                acute  
  
  
[facility]  
Work Phone: [phone] note*   
  
                          Diagnosis    Onset Date   Resolution   Status  
   
                          Anxiety                                acute  
   
                          Anxiety                                acute  
   
                          SI (sacroiliac) pain                           acute  
  
  
[facility]  
Work Phone: [phone] note*   
  
                          Diagnosis    Onset Date   Resolution   Status  
   
                          Anxiety                                acute  
   
                          Anxiety                                acute  
   
                          Vaginal irritation                           acute  
  
  
[facility]  
Work Phone: [phone] note*   
  
                                                    Diagnosis  
   
                                                      
  
  
Chest pain, unspecified type- Primary  
   
                                                      
  
  
Premature ventricular contractions  
  
  
Other premature beats  
   
                                                      
  
  
Shortness of breath on exertion  
  
  
Shortness of breath  
   
                                                      
  
  
Hyperlipidemia, unspecified hyperlipidemia type  
   
                                                      
  
  
Former smoker  
  
  
Personal history of tobacco use, presenting hazards to health  
   
                                                      
  
  
Body mass index (BMI) 22.0-22.9, adult  
  
documented in this encounter  
[facility]  
Work Phone: [phone] note*   
  
                          Diagnosis    Onset Date   Resolution   Status  
   
                          Anxiety                                acute  
   
                          Anxiety                                acute  
   
                          Vaginal irritation                           acute  
   
                          Urinary frequency                           acute  
  
  
[facility]  
Work Phone: [phone] note*   
  
                          Diagnosis    Onset Date   Resolution   Status  
   
                          Anxiety                                acute  
   
                          Anxiety                                acute  
   
                          Vaginal irritation                           acute  
   
                          Urinary frequency                           acute  
   
                          Chest pain                             acute  
   
                          Nausea                                 acute  
   
                          Pleurisy                               acute  
   
                          Weight loss, abnormal                           acute  
  
  
[facility]  
Work Phone: [phone] note*   
  
                          Diagnosis    Onset Date   Resolution   Status  
   
                          Anxiety                                acute  
   
                          Anxiety                                acute  
   
                          Vaginal irritation                           acute  
   
                          Urinary frequency                           acute  
   
                          Chest pain                             acute  
   
                          Generalized abdominal pain                           a  
cute  
   
                          Nausea                                 acute  
   
                          Pleurisy                               acute  
   
                          Weight loss, abnormal                           acute  
  
  
[facility]  
Work Phone: [phone] note*   
  
                                                    Diagnosis  
   
                                                      
  
  
Urine troubles  
  
  
Other urinary problems  
   
                                                      
  
  
Diarrhea, unspecified type  
  
documented in this encounter  
[facility]   
  
                                                    Diagnosis  
   
                                                      
  
  
Vulvar itching  
   
                                                      
  
  
Vaginal discharge  
  
  
Leukorrhea, not specified as infective  
   
                                                      
  
  
BV (bacterial vaginosis)  
  
  
Unspecified vaginitis and vulvovaginitis  
   
                                                      
  
  
Vaginal yeast infection  
  
  
Candidiasis of vulva and vagina  
  
documented in this encounter  
[facility]   
  
                                                    Diagnosis  
   
                                                      
  
  
Vaginal pain  
  
  
Unspecified symptom associated with female genital organs  
  
documented in this encounter  
[facility]   
  
                                                    Diagnosis  
   
                                                      
  
  
Encounter for gynecological examination with abnormal finding- Primary  
   
                                                      
  
  
Encounter for screening mammogram for malignant neoplasm of breast  
   
                                                      
  
  
Screening for malignant neoplasm of cervix  
  
  
Screening for malignant neoplasm of the cervix  
   
                                                      
  
  
Screening for HPV (human papillomavirus)  
  
  
Special screening examination for human papillomavirus (HPV)  
   
                                                      
  
  
Postmenopausal HRT (hormone replacement therapy)  
  
  
Need for prophylactic hormone replacement therapy (postmenopausal)  
   
                                                      
  
  
Vaginal irritation  
  
  
Pruritus of genital organs  
   
                                                      
  
  
Dyspareunia in female  
   
                                                      
  
  
Vaginal yeast infection  
  
  
Candidiasis of vulva and vagina  
  
documented in this encounter  
[facility] - Reported*   
  
                                Type            Description     Date  
   
                                Surgical History tonsillectomy as a child   
   
                                Surgical History Lung Collapsed left side   
   
                                Hospitalization History childbirth x4     
   
                                Hospitalization History Kidney infection as a   
ild   
  
  
[facility]  
Other Phone: [phone] general Narrative - Reported*   
  
                                Type            Description     Date  
   
                                Medical History Situational anxiety   
   
                                Medical History Tongue abnormality   
   
                                Surgical History tonsillectomy as a child   
   
                                Surgical History Lung Collapsed left side   
   
                                Hospitalization History childbirth x4     
   
                                Hospitalization History Kidney infection as a ch  
ild   
  
  
[facility]  
Other Phone: [phone] of Present illness Narrative* Patient is here 
  for cardiovascular evaluation for symptoms of chest pain I documented that 
  were similar presentation. Previous medical work-up included a stress test and
   echocardiogram which was negative. Her symptoms felt to be either GI in 
  nature or musculoskeletal. The patient had a previous history of resection of 
  the lung Bleb  
* By Dr. Simons . She report recent increase in frequency of her chest pain.
   She will few days ago she had an episode lasted almost the whole day. That 
  when she put her hand on her chest and pressed against the area. She 
  intermittent shortness of breath. She is described functional class I. Thereis
   no clear associated, precipitating or alleviating factors.  
* ASSESSMENT:  
* 1. Atypical chest pain appears musculoskeletal or pleuritic  
* 2. reformed smoker  
* 3. hyperlipidemia. Does not meet criteria for treatment  
* 4. minor shortness breath related to prior tobacco use  
* 5. Documentation of few premature ventricular contractions, felt to be benign.
   In the past with no recurrence  
* RECOMMENDATION:  
* 1. The patient was advised to proceed with GXT.  
* 2. I advised her to have a chest x-ray and lipid profile  
* 3. I recommended a trial of metoprolol  
* 4. I advised her to notify me with changes of cardiac status or symptoms I 
  will see her back in 2-3  
* Follow-up  
[facility]  
Work Phone: [phone] of Present illness Narrative* Patient is here 
  for cardiovascular evaluation for symptoms of chest pain I documented that 
  were similar presentation. Previous medical work-up included a stress test and
   echocardiogram which was negative. Her symptoms felt to be either GI in 
  nature or musculoskeletal. The patient had a previous history of resection of 
  the lung Bleb  
* By Dr. Simons . She report recent increase in frequency of her chest pain.
   She will few days ago she had an episode lasted almost the whole day. That 
  when she put her hand on her chest and pressed against the area. She 
  intermittent shortness of breath. She is described functional class I. Thereis
   no clear associated, precipitating or alleviating factors.  
* ASSESSMENT:  
* 1. Atypical chest pain appears musculoskeletal or pleuritic  
* 2. reformed smoker  
* 3. hyperlipidemia. Does not meet criteria for treatment  
* 4. minor shortness breath related to prior tobacco use  
* 5. Documentation of few premature ventricular contractions, felt to be benign.
   In the past with no recurrence  
* RECOMMENDATION:  
* 1. The patient was advised to proceed with GXT.  
* 2. I advised her to have a chest x-ray and lipid profile  
* 3. I recommended a trial of metoprolol  
* 4. I advised her to notify me with changes of cardiac status or symptoms I 
  will see her back in 2-3  
* Follow-up  
[facility]  
Work Phone: [phone] of Present illness Narrative* Patient is here 
  for follow-up continue management for previous evaluation for atypical chest 
  pain. Has been following the patient since  for intermittent atypical 
  chest pain. She underwent several cardiac evaluation with echocardiogram and 
  stress test all of it appears to be reassuring. Her chest pain and 
  nonexertional. She does describe some radiation to the left arm. There is no 
  clear associated, precipitated or alleviated symptoms. She did undergo 
  resection of lung bleb back in  by Dr. Patel. She also had been on 
  Prilosec for some dyspepsia. She was referred for a stress test where she was 
  able to exercise for 10 minutes. Did not have any EKG changes. And was 
  clinically negative test.  
* ASSESSMENT:  
* 1. Atypical chest pain appears musculoskeletal or pleuritic the patient had a 
  remote history of resection of lung bleb. Recent stress test is reassuring. 
  Patient continues to complain of intermittentepisodes of chest pain. Following
   last office visit I did recommend trial of beta-blockers but the patient did 
  not want to do that  
* 2. reformed smoker  
* 3. hyperlipidemia. Does not meet criteria for treatment recent lab work noted 
  and reviewed with her  
* 4. minor shortness breath related to prior tobacco use functional class I with
   excellent exercise tolerance  
* 5. Documentation of few premature ventricular contractions, felt to be benign.
   In the past with no recurrence  
* RECOMMENDATION:  
* 1. Reviewed the results of her GXT and lab work and reassured her cardiac  
* 2. I recommended calcium scoring for risk assessment considering her continued
   complaint and concern about underlying ischemic heart disease  
* 3. I discussed with her second opinion and or invasive evaluation but the 
  patient declined  
* 4. I advised her to notify me with changes of cardiac status or symptoms I 
  will see her back in 9 months  
[facility]  
Work Phone: [phone] Discharge instructions  
Additional Instructions  
Please return to emergency department for any new or worrisome symptoms 
including any numbness,  
weakness, tingling, difficulty urinating or difficulty walking. Please be 
advised that muscle  
relaxers can make you drowsy and you should stay at home until you know how they
 affect you for  
driving for at least 8 hours. Follow-up with your family physician within the 
next 3 to 5 days.[facility]  
Work Phone: [phone] for referral (narrative)* Consultation 
  (Routine) - Authorized  
  
                          Specialty    Diagnoses / Procedures Referred By Contac  
t Referred To Contact  
   
                                        Cardiology            
  
  
Diagnoses  
  
  
Premature ventricular   
contractions  
  
  
  
Procedures  
  
  
Follow Up In Cardiology                   
  
  
Roque Spears MD  
  
  
[address] [facility], [place] [address] [place] [address]  
  
  
Phone: [phone]  
  
  
Fax: [phone]                         
  
  
Roque Spears MD  
  
  
[address] [facility], [place] [address] [place] [address]  
  
  
Phone: [phone]  
  
  
Fax: [phone]  
  
  
  
                    Referral ID Status    Reason    Start Date Expiration Date V  
isits   
Requested                               Visits   
Authorized  
   
                [id number] Authorized         10/9/2024 10/9/2025 1       1  
  
  
  
  
Electronically signed by Roque Spears MD at 10/09/2024 3:53 PM EDT  
  
  
[facility]  
Work Phone: [phone]  
  
Summary Purpose  
  
  
                                                      
  
  
  
Family History  
No Family History Records FoundUnknown Family Member  
  
                                Name            Dates           Details  
   
                                                    Family history of chest pain  
: Mother(V19.8, Z84.89)  
                                                    Status:Active  
  
                              Unknown Family Member  
  
                                Name            Dates           Details  
   
                                                    Family history of chest pain  
: Mother(V19.8, Z84.89)  
                                                    Status:Active  
  
  
  
                          Relationship Condition    Age at Onset Recorded Date/T  
helen  
   
                          Not Specified No pertinent family history Unknown       
   
  
                              Unknown Family Member  
  
                                Name            Dates           Details  
   
                                                    Family history of chest pain  
: Mother(V19.8, Z84.89)  
                                                    Status:Active  
  
                              Unknown Family Member  
  
                                Name            Dates           Details  
   
                                                    Family history of chest pain  
: Mother(V19.8, Z84.89)  
                                                    Status:Active  
  
                              Unknown Family Member  
  
                                Name            Dates           Details  
   
                                                    Family history of chest pain  
: Mother(V19.8, Z84.89)  
                                                    Status:Active  
  
                              Unknown Family Member  
  
                                Name            Dates           Details  
   
                                                    Family history of chest pain  
: Mother(V19.8, Z84.89)  
                                                    Status:Active  
  
  
  
                          Relationship Condition    Age at Onset Recorded Date/T  
helen  
   
                          Not Specified No pertinent family history Unknown       
   
   
                          father            Unknown        
   
                          Not Specified Hypertension Unknown        
  
  
  
                          Relationship Condition    Age at Onset Recorded Date/T  
helen  
   
                          Not Specified No pertinent family history Unknown       
   
   
                          father            Unknown        
   
                          mother       Hypertension Unknown        
  
  
  
Advance Directives  
No Advanced Directives Records Found  
  
                                Advance Directive Response        Recorded Date/  
Time  
   
                                Advance Directives No               11:14am  
  
  
  
                                Advance Directive Response        Recorded Date/  
Time  
   
                                Advance Directives No               12:14pm  
  
  
  
Chief Complaint and Reason for Visit  
  
  
                                        Chief Complaint     R06.02  
  
  
  
                                        Chief Complaint     Amb Documentation  
Check Up  
  
  
  
                                        Chief Complaint     Check Up  
med follow up  
   
                                        Reason for Visit    Anxiety  
  
  
  
                                        Chief Complaint     Check Up  
med follow up  
Back Pain/leg numbness  
   
                                        Reason for Visit    Anxiety  
Anxiety  
  
  
  
                                        Chief Complaint     Check Up  
med follow up  
Back Pain/leg numbness  
lower back pain  
   
                                        Reason for Visit    Anxiety  
Anxiety  
SI (sacroiliac) pain  
  
  
  
                                        Chief Complaint     Check Up  
med follow up  
Back Pain/leg numbness  
lower back pain  
m53.3  
   
                                        Reason for Visit    Anxiety  
Anxiety  
SI (sacroiliac) pain  
  
  
  
                                        Chief Complaint     Check Up  
med follow up  
Back Pain/leg numbness  
lower back pain  
m53.3  
3 month follow up  
   
                                        Reason for Visit    Anxiety  
Anxiety  
  
  
  
                                        Chief Complaint     Back Pain/leg numbne  
ss  
lower back pain  
m53.3  
3 month follow up  
vaginal pain/ antibiotic not working  
   
                                        Reason for Visit    Anxiety  
Anxiety  
Vaginal irritation  
  
  
  
                                        Chief Complaint     Back Pain/leg numbne  
ss  
lower back pain  
m53.3  
3 month follow up  
vaginal pain/ antibiotic not working  
UA, cloudy, darker, and frequency  
   
                                        Reason for Visit    Anxiety  
Anxiety  
Vaginal irritation  
  
  
  
                                        Chief Complaint     Back Pain/leg numbne  
ss  
lower back pain  
m53.3  
3 month follow up  
vaginal pain/ antibiotic not working  
UA, cloudy, darker, and frequency  
Increased frequency of urination  
   
                                        Reason for Visit    Anxiety  
Anxiety  
Vaginal irritation  
Urinary frequency  
  
  
  
                                        Chief Complaint     Back Pain/leg numbne  
ss  
lower back pain  
m53.3  
3 month follow up  
vaginal pain/ antibiotic not working  
UA, cloudy, darker, and frequency  
Increased frequency of urination  
Rib Pain/Nausea  
   
                                        Reason for Visit    Anxiety  
Anxiety  
Vaginal irritation  
Urinary frequency  
Chest pain  
Nausea  
Pleurisy  
Weight loss, abnormal  
  
  
  
                                        Chief Complaint     Back Pain/leg numbne  
ss  
lower back pain  
m53.3  
3 month follow up  
vaginal pain/ antibiotic not working  
UA, cloudy, darker, and frequency  
R35.0  
Rib Pain/Nausea  
   
                                        Reason for Visit    Anxiety  
Anxiety  
Vaginal irritation  
Urinary frequency  
Chest pain  
Generalized abdominal pain  
Nausea  
Pleurisy  
Weight loss, abnormal  
  
  
  
                                        Chief Complaint     3 month follow up  
vaginal pain/ antibiotic not working  
UA, cloudy, darker, and frequency  
R35.0  
Rib Pain/Nausea  
r11.0  
r19.7  
   
                                        Reason for Visit    Anxiety  
Anxiety  
Vaginal irritation  
Urinary frequency  
Chest pain  
Generalized abdominal pain  
Nausea  
Pleurisy  
Weight loss, abnormal  
  
  
  
                                        Chief Complaint     Admit Date  
   
                                        vaginal pain/ antibiotic not working Sep  
tember 2024 2:11pm  
   
                                        UA, cloudy, darker, and frequency Octobe  
r 2024 11:36am  
   
                                        R35.0               2024 1  
1:45am  
   
                                        Rib Pain/Nausea     2024 1  
1:07am  
   
                                        r11.0               2024 1  
2:16pm  
   
                                        r19.7               2024 2  
:05pm  
   
                                        R07.9 R63.4 R11.0 R09.1 R10.84 2024 3:32pm  
  
  
  
                                        Reason for Visit    Admit Date  
   
                                        Anxiety             2024  
 2:11pm  
   
                                        Vaginal irritation  2024  
 2:11pm  
   
                                        Urinary frequency   2024 1  
1:36am  
   
                                        Chest pain          2024 1  
1:07am  
   
                                        Generalized abdominal pain 2024 11:07am  
   
                                        Nausea              2024 1  
1:07am  
   
                                        Pleurisy            2024 1  
1:07am  
   
                                        Weight loss, abnormal 2024  
 11:07am  
  
  
  
                                        Chief Complaint     Admit Date  
   
                                        vaginal pain/ antibiotic not working Sep  
tember 2024 2:11pm  
   
                                        UA, cloudy, darker, and frequency Octobe  
r 2024 11:36am  
   
                                        R35.0               2024 1  
1:45am  
   
                                        Rib Pain/Nausea     2024 1  
1:07am  
   
                                        r11.0               2024 1  
2:16pm  
   
                                        r19.7               2024 2  
:05pm  
   
                                        R07.9 R63.4 R11.0 R09.1 R10.84 2024 3:32pm  
   
                                        E04.1               2024   
2:37pm  
  
  
  
                                        Chief Complaint     Admit Date  
   
                                        CC Adult Risk Stratification  3:11pm  
   
                                        4 month f/u         2024 1  
0:54am  
   
                                        Possible Ulcer      2025   
8:26am  
  
  
  
                                        Reason for Visit    Admit Date  
   
                                        Anxiety             2024 1  
0:54am  
  
  
  
Chief Complaint  
KATHI SELF is being seen for a 2 month follow-up of.  
  
Reason for Referral  
  
  
                                        Reason              *FU 10/03   Liv  
her Edwin - ENT in   
[place] - burning tongue syndrome with   
intermittent swelling  
   
                                        Diagnosis 1         Tongue abnormality (  
Q38.3)  
   
                                        Referral Organization Replaced by [facility]  
paul  
   
                                        Referring Provider First Name Brittney  
   
                                        Referring Provider Last Name Kwabena  
   
                                        Referring Provider Specialty [facility]  
   
                                        Referred Organization Unknown Facility  
   
                                        Referred Provider   Tl Pineda  
   
                                        Referred Provider Specialty Ear, Nose an  
d Throat  
   
                                        Referral Priority   Routine  
   
                                        General Notes       Jennifer Cartwright  11:28:02 AM >received   
today, attachments made, notes locked, referral faxed  
   
                                        Clinical Notes      p: [phone]  
f: [phone]  
  
  
  
Additional Source Comments  
  
  
  
                                                    INFORMATION SOURCE (unrecogn  
ized section and content)  
   
                                          
  
  
  
                                        DATE CREATED        AUTHOR  
   
                                2018                      [facility]  
  
  
  
                                DATE CREATED    AUTHOR          AUTHOR'S ORGANIZ  
ATION  
   
                                2020                      [facility]  
ical Center  
  
  
  
                                DATE CREATED    AUTHOR          AUTHOR'S ORGANIZ  
ATION  
   
                                10/12/2022                      [facility]  
  
  
  
                                DATE CREATED    AUTHOR          AUTHOR'S ORGANIZ  
ATION  
   
                                2022                      [facility]  
s  
  
  
  
                                DATE CREATED    AUTHOR          AUTHOR'S ORGANIZ  
ATION  
   
                                2022                      [facility]  
dical Specialist  
  
  
  
                                DATE CREATED    AUTHOR          AUTHOR'S ORGANIZ  
ATION  
   
                                2023                      [facility]  
ical Center  
  
  
  
                                DATE CREATED    AUTHOR          AUTHOR'S ORGANIZ  
ATION  
   
                                2023                       [facility]  
  
  
  
                                DATE CREATED    AUTHOR          AUTHOR'S ORGANIZ  
ATION  
   
                                2023                       [facility]  
[facility]  
  
  
  
                                DATE CREATED    AUTHOR          AUTHOR'S ORGANIZ  
ATION  
   
                                2024                      [facility]  
ysician Group  
  
  
  
                                DATE CREATED    AUTHOR          AUTHOR'S ORGANIZ  
ATION  
   
                                2025                      [facility]  
dical Specialists [facility]  
  
  
  
                                DATE CREATED    AUTHOR          AUTHOR'S ORGANIZ  
ATION  
   
                                2025                      [facility]  
  
  
  
  
  
                                                    Care Teams (unrecognized sec  
tion and content)  
   
                                          
  
  
  
                                                    Team Status: Active   
   
                          Member       Role         Status       Dates  
   
                          Brittney Meza MD Primary Care Provider Active        
   
  
  
  
                                                    Team Status: Active   
   
                          Member       Role         Status       Dates  
   
                                        Brittney Meza MD Primary Care Provide  
r, Attending   
Provider                  Active                    Start: 2024  
  
  
  
  
                                                    Team Status: Inactive   
   
                          Member       Role         Status       Dates  
   
                                        Brittney Meza MD Primary Care Provide  
r,   
Attending Provider        Active                    Start: 2024  
End: 2024  
  
  
  
                                                    Team Status: Inactive   
   
                          Member       Role         Status       Dates  
   
                                        Brittney Meza MD Primary Care Provide  
r,   
Attending Provider        Active                    Start: 2025  
End: 2025  
  
  
  
                                                    Team Status: Inactive   
   
                          Member       Role         Status       Dates  
   
                                        Brittney Meza MD Primary Care Provide  
r, Attending   
Provider                  Active                    Start: May 9th, 2024  
End: May 9th, 2024  
  
  
  
                                                    Team Status: Inactive   
   
                          Member       Role         Status       Dates  
   
                                        Brittney Meza MD Primary Care Provide  
r, Attending   
Provider                  Active                    Start: 2024  
End: 2024  
  
  
  
                                                    Team Status: Inactive   
   
                          Member       Role         Status       Dates  
   
                          Brittney Meza MD Primary Care Provider Active        
   
   
                          Roque Spears MD Attending Provider Active       
    
  
  
  
                                                    Team Status: Active   
   
                          Member       Role         Status       Dates  
   
                          Brittney Meza MD Primary Care Provider Active        
 Start: 2024  
  
   
                          ISAAC Huber Attending Provider Active       Start  
: 2024  
  
  
  
  
                                                    Team Status: Inactive   
   
                          Member       Role         Status       Dates  
   
                          Brittney Meza MD Primary Care Provider Active        
 Start: 2024  
End: 2024  
   
                          Karlie Kuo MD Emergency Provider Active         
Start: 2024  
End: 2024  
  
  
  
                                                    Team Status: Inactive   
   
                          Member       Role         Status       Dates  
   
                                        Brittney Meza MD Primary Care Provide  
r, Attending   
Provider                  Active                    Start: 2024  
End: 2024  
  
  
  
                                                    Team Status: Inactive   
   
                          Member       Role         Status       Dates  
   
                                        Brittney Meza MD Primary Care Provide  
r, Attending   
Provider                  Active                    Start: 2024  
End: 2024  
  
  
  
                                                    Team Status: Inactive   
   
                          Member       Role         Status       Dates  
   
                                        Brittney Meza MD Primary Care Provide  
r, Attending   
Provider                  Active                    Start: 2024  
End: 2024  
  
  
  
                                                    Team Status: Inactive   
   
                          Member       Role         Status       Dates  
   
                                        Brittney Meza MD Primary Care Provide  
r,   
Attending Provider        Active                    Start: 2024  
End: 2024  
  
  
  
                      Team Member Relationship Specialty  Start Date End Date  
   
                                                      
  
  
Brittney Meza MD  
  
  
NPI: [id number]  
  
  
[address] [place] [address]  
  
  
[phone] (Work)  
  
  
[phone] (Fax) PCP - General                   21            
  
  
  
                                                    Team Status: Inactive   
   
                          Member       Role         Status       Dates  
   
                                        Brittney Meza MD Primary Care Provide  
r,   
Attending Provider        Active                    Start: 2024  
End: 2024  
  
  
  
                                                    Team Status: Inactive   
   
                          Member       Role         Status       Dates  
   
                                        Brittney Meza MD Primary Care Provide  
r,   
Attending Provider        Active                    Start: 2024  
End: 2024  
  
  
  
                      Team Member Relationship Specialty  Start Date End Date  
   
                                                      
  
  
Brittney Meza MD  
  
  
NPI: [id number]  
  
  
[address] [place] [address]   
[address]  
  
  
[phone] (Work)  
  
  
[phone] (Fax) PCP - General   Family Medicine 23          
   
                                                      
  
  
Bree Marcano DO  
  
  
NPI: [id number]  
  
  
[address]   
[address]  
  
  
[place] [address]  
  
  
[phone] (Work)  
  
  
[phone] (Fax)        Referring Physician       Obstetrics and   
Gynecology                23                    
  
  
  
                      Team Member Relationship Specialty  Start Date End Date  
   
                                                      
  
  
Brittney Meza MD  
  
  
NPI: [id number]  
  
  
[address] [place]   
[id number] [phone] (Work)  
  
  
[phone] (Fax) PCP - General   Family Medicine 23          
   
                                                      
  
  
Bree Marcano DO  
  
  
NPI: [id number]  
  
  
[address]   
[address]  
  
  
[place] [address]  
  
  
[phone] (Work)  
  
  
[phone] (Fax)        Referring Physician       Obstetrics and   
Gynecology                23                    
  
  
  
                                                    Team Status: Inactive   
   
                          Member       Role         Status       Dates  
   
                          Brittney Meza MD Primary Care Provider Active        
 Start: 2024  
End: 2024  
   
                          Kathi Self PA-C Attending Provider Active       Sta  
rt: 2024  
End: 2024  
  
  
  
                                                    Team Status: Inactive   
   
                          Member       Role         Status       Dates  
   
                                        Brittney Meza MD Primary Care Provide  
r,   
Attending Provider        Active                    Start: 2024  
End: 2024  
  
  
  
                                                    Team Status: Inactive   
   
                          Member       Role         Status       Dates  
   
                                        Brittney Meza MD Primary Care Provide  
r,   
Attending Provider        Active                    Start: 2024  
End: 2024  
  
  
  
                      Team Member Relationship Specialty  Start Date End Date  
   
                                                      
  
  
Brittney Meza MD  
  
  
NPI: [id number]  
  
  
[address] [place]   
[id number] [phone] (Work)  
  
  
[phone] (Fax) PCP - General   Family Medicine 23          
   
                                                      
  
  
Bree Marcano DO  
  
  
NPI: [id number]  
  
  
[address]   
[address]  
  
  
[place] [address]  
  
  
[phone] (Work)  
  
  
[phone] (Fax)        Referring Physician       Obstetrics and   
Gynecology                23                    
  
  
  
                      Team Member Relationship Specialty  Start Date End Date  
   
                                                      
  
  
Brittney Meza MD  
  
  
NPI: [id number]  
  
  
[address] [place]   
[id number] [phone] (Work)  
  
  
[phone] (Fax) PCP - General   Family Medicine 23          
   
                                                      
  
  
Bree Marcano DO  
  
  
NPI: [id number]  
  
  
[address]   
[address]  
  
  
[place] [address]  
  
  
[phone] (Work)  
  
  
[phone] (Fax)        Referring Physician       Obstetrics and   
Gynecology                23                    
  
  
  
                      Team Member Relationship Specialty  Start Date End Date  
   
                                                      
  
  
Brittney Meza MD  
  
  
NPI: [id number]  
  
  
[address] [place]   
[id number] [phone] (Work)  
  
  
[phone] (Fax) PCP - General   Family Medicine 23          
   
                                                      
  
  
Bree Marcano DO  
  
  
NPI: [id number]  
  
  
[address]   
[address]  
  
  
[place] [address]  
  
  
[phone] (Work)  
  
  
[phone] (Fax)        Referring Physician       Obstetrics and   
Gynecology                23                    
  
  
  
                      Team Member Relationship Specialty  Start Date End Date  
   
                                                      
  
  
Brittney Meza MD  
  
  
NPI: [id number]  
  
  
[address] [place]   
[address] [phone] (Work)  
  
  
[phone] (Fax) PCP - General   Family Medicine 23          
   
                                                      
  
  
Bree Marcano DO  
  
  
NPI: [id number]  
  
  
[address] [facility]   
[address]  
  
  
[place] [address]  
  
  
[phone] (Work)  
  
  
[phone] (Fax)        Referring Physician       Obstetrics and   
Gynecology                23                    
  
  
  
                      Team Member Relationship Specialty  Start Date End Date  
   
                                                      
  
  
Brittney Meza MD  
  
  
NPI: [id number]  
  
  
[address] [place]   
[address] [phone] (Work)  
  
  
[phone] (Fax) PCP - General   Family Medicine 23          
   
                                                      
  
  
Bree Marcano DO  
  
  
NPI: [id number]  
  
  
[address]   
[address]  
  
  
[place] [address]  
  
  
[phone] (Work)  
  
  
[phone] (Fax)        Referring Physician       Obstetrics and   
Gynecology                23                    
  
  
  
                      Team Member Relationship Specialty  Start Date End Date  
   
                                                      
  
  
Brittney Meza MD  
  
  
NPI: [id number]  
  
  
[address] [place]   
[id number] [phone] (Work)  
  
  
[phone] (Fax) PCP - General   Family Medicine 23          
   
                                                      
  
  
Bree aMrcano DO  
  
  
NPI: [id number]  
  
  
[address]   
[address]  
  
  
[place] [address]  
  
  
[phone] (Work)  
  
  
[phone] (Fax)        Referring Physician       Obstetrics and   
Gynecology                23                    
  
  
  
  
  
                                                    Goals (unrecognized section   
and content)  
   
                                                    Goals may be documented in a  
n alternate sectionNo InformationNo InformationNo   
InformationNo InformationNo InformationNo InformationNo InformationNo 
InformationNo   
InformationNo InformationNo InformationGoals may be documented in an alternate   
sectionGoals may be documented in an alternate sectionGoals may be documented in
 an   
alternate sectionGoals may be documented in an alternate sectionGoals may be   
documented in an alternate sectionGoals may be documented in an alternate   
sectionGoals may be documented in an alternate sectionGoals may be documented in
 an   
alternate sectionGoals may be documented in an alternate sectionGoals may be   
documented in an alternate sectionGoals may be documented in an alternate   
sectionGoals may be documented in an alternate sectionGoals may be documented in
 an   
alternate sectionGoals may be documented in an alternate sectionGoals may be   
documented in an alternate sectionGoals may be documented in an alternate 
section  
  
  
  
  
                                                    REASON FOR VISIT (unrecogniz  
ed section and content)  
   
                                          
  
  
  
                                        Reason              Comments  
   
                                        Annual Exam           
  
  
  
                          Specialty    Diagnoses / Procedures Referred By Contac  
t Referred To Contact  
   
                                        Cardiology            
  
  
Diagnoses  
  
  
Premature ventricular   
contractions  
  
  
  
Procedures  
  
  
Follow Up In Cardiology                   
  
  
Roque Spears MD  
  
  
[address] [facility] [address]  
  
  
[place] [address]  
  
  
Phone: [phone]  
  
  
Fax: [phone]                         
  
  
  
  
  
                Referral ID Status  Reason  Start Date Expiration Date Visits Re  
quested Visits   
Authorized  
   
                [id number]  Closed          10/3/2023 3/31/2024 1       1  
  
  
  
                                        Reason              Comments  
   
                                        Blood in Urine      Pt present today for  
 urinary issues. Pt states she has blood and   
leuks   
in her urine. Pt had pain in urethra and very dark urine. Pt states   
she has lost 15 pounds since August. Wants to discuss a referral to   
Urology.  
  
  
  
                                        Reason              Comments  
   
                                        Gynecologic Exam    C/o redness and infl  
ammation in vagina started 5w ago when she   
had a   
yeast infection- treated w/ OTC Monistat. States it went away, but   
returned, so she did another round of Monistat. Denies vaginal   
discharge, odor, itching. Sexually active, denies pain with   
intercourse, but admits she has been avoiding it. Thinks Bartholin   
Cyst.  
  
  
  
                                        Reason              Comments  
   
                                        Vaginal Pain          
  
  
  
                                        Reason              Comments  
   
                                        Gynecologic Exam    Pt presents for pap/  
yearly. Denies breast,bowel,bladder. Having  
   
dryness with intercourse. CT scan showed fibroids in her uterus she   
would like to discuss.  
  
  
FOR RECORDS PERTAINING TO PATIENTS WHO ARE OR HAVE BEEN ENROLLED IN A CHEMICAL 
DEPENDENCY/SUBSTANCEABUSE PROGRAM, SOME INFORMATION MAY BE OMITTED. This 
clinical summary was aggregated from multiple sources. Caution should be 
exercised in using it in the provision of clinical care. This summary normalizes
 information from multiple sources, and as a consequence, information in this 
document may materially change the coding, format and clinical context of 
patient data. In addition, data may be omitted in some cases. CLINICAL DECISIONS
 SHOULD BE BASED ON THE PRIMARY CLINICAL RECORDS. [employer] provides
 no warranty or guarantee of the accuracy or completeness of information in this
 document.

## 2025-03-10 NOTE — PM.CN
Consult Note: HPI
Data of Consult
Patient: new to practice
Consult date: 03/10/25
Requesting Physician: 
Jyoti Chris MD

Primary Care Provider: 
Leah Yuan MD

Consult Narrative
Reason for consult: low back, bilateral leg pain
Narrative: 
54yof who presents for evaluation. longstanding low back, bilateral lower extremity pain. has engaged in a series of provider directed home exercises >6 weeks, without lasting benefit. lumbar xr with extensive degenerative changes. uses otc pain 
meds as needed.
cc:: 
CC: Jyoti Chris MD


Review of Systems
ROS  
 Status of ROS 10 or more systems reviewed and unremarkable except as noted in history and below   

PFSH
PFSH
Social History (Reviewed 03/10/25 @ 14:08 by Jyoti Chris MD)
Smoking status:  Never smoker 



Meds
Home Medications and Allergies
Allergies

Allergy/AdvReac Type Severity Reaction Status Date / Time
No Known Drug Allergies Allergy   Verified 01/11/24 22:18



Exam
Narrative
Exam Narrative: 
Psych-alert and oriented x 3. Attentive and appropriate, constitutionally normal, displays normal mood and affect per situation. There are no obvious deficits in memory, reasoning, or intellect.?
Skin-no obvious rashes, bruising, erythema noted to the patient's area of pain.?
Extremities- extremities are warm with minimal edema and palpable pulses.
Lumbar-tenderness to palpation noted in the lumbar spine and paraspinal musculature. Pain is elicited with flexion, extension, and lateral rotation of the lumbar spine. Range of motion is diminished with these motions. Facet loading maneuvers are 
positive.
Strength-noted to be unremarkable
Sensory-no notable sensory deficits in the bilateral lower extremities to touch or pinprick in all dermatomal distributions with the exception to decreased sensation to the bilateral L4, 5 dermatomal distribution
Coordination remains intact.?
Gait remains non-antalgic.

Assessment and Plan
Assessment and Plan
(1) Lumbar stenosis with neurogenic claudication: 

Plan
54yof who presents for evaluation. failed conservative measures, as noted. imaging reviewed, as noted. given symptoms and imaging, prudent to obtain lumbar mri without contrast for further info. she is in agreement. meds reviewed, no changes. follow 
up after imaging.

## 2025-03-13 ENCOUNTER — APPOINTMENT (OUTPATIENT)
Dept: CARDIOLOGY | Facility: CLINIC | Age: 55
End: 2025-03-13
Payer: COMMERCIAL

## 2025-03-13 VITALS
HEIGHT: 63 IN | BODY MASS INDEX: 21.26 KG/M2 | HEART RATE: 76 BPM | SYSTOLIC BLOOD PRESSURE: 124 MMHG | DIASTOLIC BLOOD PRESSURE: 74 MMHG | WEIGHT: 120 LBS

## 2025-03-13 DIAGNOSIS — Z87.891 FORMER SMOKER: ICD-10-CM

## 2025-03-13 DIAGNOSIS — R07.9 CHEST PAIN, UNSPECIFIED TYPE: Primary | ICD-10-CM

## 2025-03-13 DIAGNOSIS — E78.5 HYPERLIPIDEMIA, UNSPECIFIED HYPERLIPIDEMIA TYPE: ICD-10-CM

## 2025-03-13 DIAGNOSIS — R06.02 SHORTNESS OF BREATH ON EXERTION: ICD-10-CM

## 2025-03-13 DIAGNOSIS — I49.3 PREMATURE VENTRICULAR CONTRACTIONS: ICD-10-CM

## 2025-03-13 PROCEDURE — 1036F TOBACCO NON-USER: CPT | Performed by: INTERNAL MEDICINE

## 2025-03-13 PROCEDURE — 3008F BODY MASS INDEX DOCD: CPT | Performed by: INTERNAL MEDICINE

## 2025-03-13 PROCEDURE — 99214 OFFICE O/P EST MOD 30 MIN: CPT | Performed by: INTERNAL MEDICINE

## 2025-03-13 RX ORDER — ESTRADIOL AND NORETHINDRONE ACETATE 1; .5 MG/1; MG/1
1 TABLET, FILM COATED ORAL DAILY
COMMUNITY

## 2025-03-13 ASSESSMENT — ENCOUNTER SYMPTOMS
PALPITATIONS: 1
LIGHT-HEADEDNESS: 1
SHORTNESS OF BREATH: 1

## 2025-03-13 NOTE — PROGRESS NOTES
Chief Complaint   Patient presents with    Follow-up     Patient request - CP, left shoulder, neck pain       Subjective   Alison Harrison is a 54 y.o. female     HPI      Patient is here for follow-up continue management for previous evaluation for chest pain and shortness of breath.  She had remote surgery for resection of lung blebs and since then has been complaining of ongoing intermittent episodes of chest pain and shortness of breath she had undergone several evaluation in the past including stress test and calcium scoring.  She recently have increase of frequency of left-sided chest pain radiated to the left arm.  Nonexertional.  She discussed some symptom fatigue and tiredness.  Occasional lightheadedness and palpitation she admit the symptoms make her anxious.  She did undergo CT scan of the chest recently which I was able to review online and showed no acute pathology.    ASSESSMENT:      1.  Continue complaint of atypical chest pain appears musculoskeletal or pleuritic the patient had a remote history of resection of lung bleb.  Remote stress test is reassuring.  Calcium scoring was low  2. reformed smoker   3. hyperlipidemia. Does not meet criteria for treatment recent lab work noted and reviewed with her  4. minor shortness breath related to prior tobacco use functional class I with excellent exercise tolerance  5. Documentation of few premature ventricular contractions, felt to be benign. In the past with no recurrence     RECOMMENDATION:      1.  I advised the patient to proceed with GXT and echocardiogram  2. I recommended continue with observant approach discussed with her risk factor modification  3. I discussed with her risk factor modification  4. I advised her to notify me with changes of cardiac status or symptoms I will see her back in 6 months  Review of Systems   Constitutional: Positive for malaise/fatigue.   Cardiovascular:  Positive for chest pain and palpitations.   Respiratory:  Positive  "for shortness of breath.    Neurological:  Positive for light-headedness.            Vitals:    03/13/25 1517   BP: 124/74   BP Location: Left arm   Patient Position: Sitting   Pulse: 76   Weight: 54.4 kg (120 lb)   Height: 1.6 m (5' 3\")        Objective   Physical Exam  Constitutional:       Appearance: Normal appearance.   HENT:      Nose: Nose normal.   Neck:      Vascular: No carotid bruit.   Cardiovascular:      Rate and Rhythm: Normal rate.      Pulses: Normal pulses.      Heart sounds: Normal heart sounds.   Pulmonary:      Effort: Pulmonary effort is normal.   Abdominal:      General: Bowel sounds are normal.      Palpations: Abdomen is soft.   Musculoskeletal:         General: Normal range of motion.      Cervical back: Normal range of motion.      Right lower leg: No edema.      Left lower leg: No edema.   Skin:     General: Skin is warm and dry.   Neurological:      General: No focal deficit present.      Mental Status: She is alert.   Psychiatric:         Mood and Affect: Mood normal.         Behavior: Behavior normal.         Thought Content: Thought content normal.         Judgment: Judgment normal.         Allergies  Patient has no known allergies.     Current Medications    Current Outpatient Medications:     ALPRAZolam (Xanax) 0.25 mg tablet, Take 1 tablet (0.25 mg) by mouth if needed for anxiety., Disp: , Rfl:     famotidine (Pepcid) 20 mg tablet, Take 1 tablet (20 mg) by mouth once daily at bedtime., Disp: , Rfl:     Mimvey 1-0.5 mg tablet, Take 1 tablet by mouth once daily., Disp: , Rfl:     omeprazole (PriLOSEC) 20 mg DR capsule, Take 1 capsule (20 mg) by mouth once daily in the morning. Take before meals. Do not crush or chew., Disp: , Rfl:                      Assessment/Plan   1. Chest pain, unspecified type  Stress Test    Transthoracic Echo Complete      2. Shortness of breath on exertion  Stress Test    Transthoracic Echo Complete      3. Premature ventricular contractions  Stress Test    " Transthoracic Echo Complete      4. Hyperlipidemia, unspecified hyperlipidemia type        5. Former smoker        6. BMI 21.0-21.9, adult                 Scribe Attestation  By signing my name below, I, Gigi Paul LPNibrobert   attest that this documentation has been prepared under the direction and in the presence of Shai Saenz MD.     Provider Attestation - Scribe documentation    All medical record entries made by the Scribe were at my direction and personally dictated by me. I have reviewed the chart and agree that the record accurately reflects my personal performance of the history, physical exam, discussion and plan.

## 2025-03-13 NOTE — LETTER
March 13, 2025     Aleah Bassett MD  [facility]  [facility] [address]    Patient: Alison Harrison   YOB: 1970   Date of Visit: 3/13/2025       Dear Dr. Aleah Bassett MD:    Thank you for referring Alison Harrison to me for evaluation. Below are my notes for this consultation.  If you have questions, please do not hesitate to call me. I look forward to following your patient along with you.       Sincerely,     Shai Saenz MD      CC: No Recipients  ______________________________________________________________________________________    Chief Complaint   Patient presents with   • Follow-up     Patient request - CP, left shoulder, neck pain       Subjective   Alison Harrison is a 54 y.o. female     HPI      Patient is here for follow-up continue management for previous evaluation for chest pain and shortness of breath.  She had remote surgery for resection of lung blebs and since then has been complaining of ongoing intermittent episodes of chest pain and shortness of breath she had undergone several evaluation in the past including stress test and calcium scoring.  She recently have increase of frequency of left-sided chest pain radiated to the left arm.  Nonexertional.  She discussed some symptom fatigue and tiredness.  Occasional lightheadedness and palpitation she admit the symptoms make her anxious.  She did undergo CT scan of the chest recently which I was able to review online and showed no acute pathology.    ASSESSMENT:      1.  Continue complaint of atypical chest pain appears musculoskeletal or pleuritic the patient had a remote history of resection of lung bleb.  Remote stress test is reassuring.  Calcium scoring was low  2. reformed smoker   3. hyperlipidemia. Does not meet criteria for treatment recent lab work noted and reviewed with her  4. minor shortness breath related to prior tobacco use functional class I with excellent exercise tolerance  5. Documentation of few  "premature ventricular contractions, felt to be benign. In the past with no recurrence     RECOMMENDATION:      1.  I advised the patient to proceed with GXT and echocardiogram  2. I recommended continue with observant approach discussed with her risk factor modification  3. I discussed with her risk factor modification  4. I advised her to notify me with changes of cardiac status or symptoms I will see her back in 6 months  Review of Systems   Constitutional: Positive for malaise/fatigue.   Cardiovascular:  Positive for chest pain and palpitations.   Respiratory:  Positive for shortness of breath.    Neurological:  Positive for light-headedness.            Vitals:    03/13/25 1517   BP: 124/74   BP Location: Left arm   Patient Position: Sitting   Pulse: 76   Weight: 54.4 kg (120 lb)   Height: 1.6 m (5' 3\")        Objective   Physical Exam  Constitutional:       Appearance: Normal appearance.   HENT:      Nose: Nose normal.   Neck:      Vascular: No carotid bruit.   Cardiovascular:      Rate and Rhythm: Normal rate.      Pulses: Normal pulses.      Heart sounds: Normal heart sounds.   Pulmonary:      Effort: Pulmonary effort is normal.   Abdominal:      General: Bowel sounds are normal.      Palpations: Abdomen is soft.   Musculoskeletal:         General: Normal range of motion.      Cervical back: Normal range of motion.      Right lower leg: No edema.      Left lower leg: No edema.   Skin:     General: Skin is warm and dry.   Neurological:      General: No focal deficit present.      Mental Status: She is alert.   Psychiatric:         Mood and Affect: Mood normal.         Behavior: Behavior normal.         Thought Content: Thought content normal.         Judgment: Judgment normal.         Allergies  Patient has no known allergies.     Current Medications    Current Outpatient Medications:   •  ALPRAZolam (Xanax) 0.25 mg tablet, Take 1 tablet (0.25 mg) by mouth if needed for anxiety., Disp: , Rfl:   •  famotidine " (Pepcid) 20 mg tablet, Take 1 tablet (20 mg) by mouth once daily at bedtime., Disp: , Rfl:   •  Mimvey 1-0.5 mg tablet, Take 1 tablet by mouth once daily., Disp: , Rfl:   •  omeprazole (PriLOSEC) 20 mg DR capsule, Take 1 capsule (20 mg) by mouth once daily in the morning. Take before meals. Do not crush or chew., Disp: , Rfl:                      Assessment/Plan   1. Chest pain, unspecified type  Stress Test    Transthoracic Echo Complete      2. Shortness of breath on exertion  Stress Test    Transthoracic Echo Complete      3. Premature ventricular contractions  Stress Test    Transthoracic Echo Complete      4. Hyperlipidemia, unspecified hyperlipidemia type        5. Former smoker        6. BMI 21.0-21.9, adult                 Scribe Attestation  By signing my name below, IFelicia LPN, Scribe   attest that this documentation has been prepared under the direction and in the presence of Shai Saenz MD.     Provider Attestation - Scribe documentation    All medical record entries made by the Scribe were at my direction and personally dictated by me. I have reviewed the chart and agree that the record accurately reflects my personal performance of the history, physical exam, discussion and plan.

## 2025-03-20 ENCOUNTER — APPOINTMENT (OUTPATIENT)
Dept: CARDIOLOGY | Facility: CLINIC | Age: 55
End: 2025-03-20
Payer: COMMERCIAL

## 2025-03-20 VITALS
HEART RATE: 84 BPM | SYSTOLIC BLOOD PRESSURE: 134 MMHG | HEIGHT: 64 IN | DIASTOLIC BLOOD PRESSURE: 84 MMHG | WEIGHT: 120.7 LBS | BODY MASS INDEX: 20.61 KG/M2

## 2025-03-20 DIAGNOSIS — R42 DIZZINESS: ICD-10-CM

## 2025-03-20 DIAGNOSIS — R06.02 SHORTNESS OF BREATH ON EXERTION: ICD-10-CM

## 2025-03-20 DIAGNOSIS — Z82.49 FAMILY HISTORY OF CORONARY ARTERY DISEASE: ICD-10-CM

## 2025-03-20 DIAGNOSIS — I20.9 NEW-ONSET ANGINA (CMS-HCC): ICD-10-CM

## 2025-03-20 DIAGNOSIS — R07.9 CHEST PAIN, UNSPECIFIED TYPE: ICD-10-CM

## 2025-03-20 DIAGNOSIS — E78.5 HYPERLIPIDEMIA, UNSPECIFIED HYPERLIPIDEMIA TYPE: ICD-10-CM

## 2025-03-20 DIAGNOSIS — R00.2 PALPITATIONS: ICD-10-CM

## 2025-03-20 DIAGNOSIS — Z87.891 FORMER SMOKER: ICD-10-CM

## 2025-03-20 PROCEDURE — 3008F BODY MASS INDEX DOCD: CPT | Performed by: INTERNAL MEDICINE

## 2025-03-20 PROCEDURE — 1036F TOBACCO NON-USER: CPT | Performed by: INTERNAL MEDICINE

## 2025-03-20 PROCEDURE — 99214 OFFICE O/P EST MOD 30 MIN: CPT | Performed by: INTERNAL MEDICINE

## 2025-03-20 RX ORDER — METOPROLOL TARTRATE 50 MG/1
TABLET ORAL
Qty: 1 TABLET | Refills: 0 | Status: SHIPPED | OUTPATIENT
Start: 2025-03-20

## 2025-03-20 NOTE — PATIENT INSTRUCTIONS
You will be scheduled for a CT coronary angiogram in the near future  Please have blood work done soon.  A prescription for Metoprolol Tartrate 50 mg has been sent to your pharmacy.  Please take this 1 hour prior to your CT scan to lower your heart rate for the test.  Patient to follow up after testing.    Continue same medications/treatment.  Patient educated on proper medication use.  Patient educated on risk factor modification.  Please bring any lab results from other providers / physicians to your next appointment.    Please bring all medicines, vitamins and herbal supplements with you when you come to the office.    Prescriptions will not be filled unless you are compliant with your follow up appointments or have a follow up  appointment scheduled as per instruction of your physician.  Refills should be requested at the time of  Your visit.

## 2025-03-20 NOTE — PROGRESS NOTES
CARDIOLOGY OFFICE VISIT      CHIEF COMPLAINT  Chief complaint:   Chief Complaint   Patient presents with    Follow-up     Second Opinion   Chest pain    HISTORY OF PRESENT ILLNESS  The patient is being seen today for a second opinion regarding her chest pain.  The does have a past history of surgery for resection of lung blebs in 2009.  She has had atypical chest discomfort on and off since then.  Recently she has had a different type of chest discomfort which concerns her.  The chest discomfort is a heavy discomfort in her chest and below her left breast.  There is radiation to her left shoulder and her left arm.  There is heaviness in her left arm with this.  She also has a dull tingling sensation left side of her neck.  The episodes last 15 minutes to 1 hour.  The episodes can occur with exertion and at rest.  This is definitely different than the symptoms she has had in the past.  She has been bothered by these for the last 4 weeks or so.  She does have some mild dyspnea at times with the discomfort.  She has some brief palpitations.  She denies any prolonged palpitations.  The palpitations are a fluttering like sensation.  She does have some brief lightheaded spells she denies any syncopal episodes or near syncopal episodes.  She did have graded exercise test in 2022 which was normal.  She exercised for 10 minutes according to Abad protocol.  She states she had an echocardiogram done 5 years ago which she states was normal I do not have that result.  She has not had a recent lab work.  I told her like to obtain CBC, chemistry profile, and lipid profile.  She did recently have a CT scan of the chest and abdomen by her family doctor.  There was some note of possible thyroid problem.  She did have ultrasound of thyroid which is okay.  There was noted to be atherosclerotic calcifications of the abdominal aorta and branch vessels.  I explained to her what this means.      Impression:  CAD manifested by coronary  "calcium score of 20, February 2023  Chest discomfort, new onset angina pectoris  Family history of coronary artery disease ,mother has had PCI with stenting of her LAD  Former smoker    Plan:  CT coronary angiogram  Follow-up after CT coronary angiogram to go over results of lab work and that study.    Please excuse any errors in grammar or translation related to this dictation.  Voice recognition software was utilized to prepare this document.      Past Medical History:  She has no past medical history on file.    Past Surgical History:  She has a past surgical history that includes Other surgical history (11/09/2022); Other surgical history (11/09/2022); and Other surgical history (11/10/2022).      Social History:  She reports that she quit smoking about 5 years ago. Her smoking use included cigarettes. She has never used smokeless tobacco. She reports current alcohol use of about 3.0 standard drinks of alcohol per week. She reports that she does not use drugs.    Family History:  Family History   Problem Relation Name Age of Onset    Other (chest pain) Mother          Allergies:  Patient has no known allergies.    Outpatient Medications:  Current Outpatient Medications   Medication Instructions    ALPRAZolam (XANAX) 0.25 mg, As needed    famotidine (PEPCID) 20 mg, Nightly    metoprolol tartrate (Lopressor) 50 mg tablet Take 1 tablet by mouth 1 hour prior to CT coronary angiogram    Mimvey 1-0.5 mg tablet 1 tablet, Daily    omeprazole (PRILOSEC) 20 mg, Daily before breakfast        Last Recorded Vitals:  Vitals:    03/20/25 1413   BP: 134/84   BP Location: Left arm   Patient Position: Sitting   Pulse: 84   Weight: 54.7 kg (120 lb 11.2 oz)   Height: 1.613 m (5' 3.5\")                    Rodrigo uK MD since then she has been having intermittent episodes of chest discomfort.  She has undergone    Past Medical History  History reviewed. No pertinent past medical history.    Social History  Social History "     Tobacco Use    Smoking status: Former     Current packs/day: 0.00     Types: Cigarettes     Quit date:      Years since quittin.2    Smokeless tobacco: Never   Substance Use Topics    Alcohol use: Yes     Alcohol/week: 3.0 standard drinks of alcohol     Types: 3 Standard drinks or equivalent per week     Comment: socially    Drug use: Never       Family History     Family History   Problem Relation Name Age of Onset    Other (chest pain) Mother          Allergies:  No Known Allergies     Outpatient Medications:  Current Outpatient Medications   Medication Instructions    ALPRAZolam (XANAX) 0.25 mg, As needed    famotidine (PEPCID) 20 mg, Nightly    Mimvey 1-0.5 mg tablet 1 tablet, Daily    omeprazole (PRILOSEC) 20 mg, Daily before breakfast          REVIEW OF SYSTEMS  Review of Systems   All other systems reviewed and are negative.        VITALS  There were no vitals filed for this visit.    PHYSICAL EXAM  Vitals and nursing note reviewed.   Constitutional:       Appearance: Healthy appearance. Not in distress.   Eyes:      Conjunctiva/sclera: Conjunctivae normal.      Pupils: Pupils are equal, round, and reactive to light.   Neck:      Vascular: No JVR. JVD normal.   Pulmonary:      Effort: Pulmonary effort is normal.      Breath sounds: Normal breath sounds. No wheezing. No rhonchi. No rales.   Chest:      Chest wall: Not tender to palpatation.   Cardiovascular:      PMI at left midclavicular line. Normal rate. Regular rhythm. Normal S1. Normal S2.       Murmurs: There is no murmur.      No gallop.  No click. No rub.   Pulses:     Intact distal pulses.   Edema:     Peripheral edema absent.   Abdominal:      General: Bowel sounds are normal.      Palpations: Abdomen is soft.      Tenderness: There is no abdominal tenderness.   Musculoskeletal: Normal range of motion.         General: No tenderness. Skin:     General: Skin is warm and dry.   Neurological:      General: No focal deficit present.       Mental Status: Alert and oriented to person, place and time.           ASSESSMENT AND PLAN  Diagnoses and all orders for this visit:  Hyperlipidemia, unspecified hyperlipidemia type  Shortness of breath on exertion  Chest pain, unspecified type  Family history of coronary artery disease  Palpitations  BMI 21.0-21.9, adult  Dizziness  Former smoker  New-onset angina (CMS-HCC)      [unfilled]    I,Sue Taveras LPN am scribing for, and in the presence of Dr. Rodrigo Ku.    I, Dr. Rodrigo Ku, personally performed the services described in the documentation as scribed by Sue Taveras LPN in my presence, and confirm it is both accurate and complete.    Dr. Rodrigo Reynoso MD  Thank you for allowing me to participate in the care of this patient. Please do not hesitate to contact me with any further questions or concerns.

## 2025-03-24 ENCOUNTER — HOSPITAL ENCOUNTER
Dept: HOSPITAL 101 - MRI | Age: 55
Discharge: HOME | End: 2025-03-24
Payer: COMMERCIAL

## 2025-03-24 DIAGNOSIS — M48.062: Primary | ICD-10-CM

## 2025-03-24 DIAGNOSIS — M51.369: ICD-10-CM

## 2025-03-24 PROCEDURE — 72148 MRI LUMBAR SPINE W/O DYE: CPT

## 2025-03-24 NOTE — XMS_ITS
Comprehensive CCD (C-CDA v2.1)  
  
                           Created on: 2025  
  
  
KATHI SELF  
External Reference #: CDR,PersonID:[id number]  
: 1970  
Sex: Female  
  
Demographics  
  
  
                                        Address             [address] [place]  [address]  
   
                                        Home Phone          [phone]  
   
                                        Home Phone          [phone]  
   
                                        Work Phone          [phone]  
   
                                        Preferred Language  en  
   
                                        Marital Status        
   
                                        [employer] Affiliation Unknown  
   
                                        Race                White  
   
                                        Ethnic Group        Not  or Lati  
no  
  
  
Author  
  
  
                                        Organization        [facility]  
  
  
Care Team Providers  
  
  
                                Care Team Member Name Role            Phone  
   
                                ROQUE HARTMAN Unavailable     Unavailable  
   
                                BRITTNEY MEZA   Unavailable     Unavailable  
   
                                MABEL, RADHA Admitting       Unavailable  
   
                                MABEL, RADHA Attending       Unavailable  
   
                                REQUEST,  NONE LISTED Primary Care    Unavaila  
ble  
   
                                MABEL, RADHA Consulting      Unavailable  
   
                                MABEL, RADHA Admitting       Unavailable  
   
                                MABEL, RADHA Attending       Unavailable  
   
                                REQUEST,  NONE LISTED Primary Care    Unavaila  
ble  
   
                                MABEL, RADHA Consulting      Unavailable  
   
                                MIKAELA SUAREZ Admitting       Unavailable  
   
                                MIKAELA SUAREZ Attending       Unavailable  
   
                                REQUEST,  NONE LISTED Primary Care    Unavaila  
MIKAELA [place] Consulting      Unavailable  
   
                                Brittney Meza Unavailable     [phone]  
   
                                Unavailable     Unavailable     [phone]  
   
                                MD Brittney Meza Primary Care Provider [phone]   
   
                                MD Roque Hartman Attending Provider [phone]  
   
                                Tory, Dr. Morales Referring       Unavaila  
ble  
   
                                Tory, Dr. Morales Attending       Unavaila  
arturo Meza, Dr. Brittney Finley Primary Care    Unav  
ailable  
   
                                Kwabena, Dr. Brittney Finley Primary Care    Unav  
ailable  
   
                                Tory, Dr. Morales Referring       Unavaila  
ble  
   
                                Tory, Dr. Morales Attending       Unavaila  
ble  
   
                                Kwabena, Dr. Brittney Finley Primary Care    Unav  
ailable  
   
                                Tory, Dr. Morales Attending       Unavaila  
ble  
   
                                Tory, Dr. Morales Attending       Unavaila  
ble  
   
                                Kwabena, Dr. Brittney Finley Primary Care    Unav  
ailable  
   
                                Fiona Bland Unavailable     [phone]  
[phone]  
   
                                Brittney Meza   Unavailable     [phone]  
   
                                Drew Mejia  Unavailable     [phone]  
   
                                MD Brittney Meza Primary Care Provider [phone]  
  
   
                                MD Karlie Kuo Emergency Provider [phone]  
   
                                MD Brittney Meza Attending Provider [phone]  
   
                                Brittney Meza MD Primary Care Provider [phone]  
  
   
                                Brittney Meza MD Primary Care Provider [phone]  
   
                                Visci DO, Bree A Unavailable     [phone]  
   
                                MD Brittney Meza Primary Care Provider [phone]  
  
   
                                MD Brittney Meza Attending Provider [phone]  
   
                                STEFANIA Carrasco Attending Provider [phone]  
[phone]  
   
                                Brittney Meza MD Primary Care Provider [phone]  
  
   
                                Brittney Meza MD Attending Provider [phone]  
   
                                Danilo AGUIAR, Kathi OLIVERA Attending Provider [phone]  
[phone]  
   
                                Karlie Kuo Admitting       Unavailable  
   
                                Karlie Kuo Attending       Unavailable  
   
                                Meza, Brittney E Primary Care    Unavailable  
   
                                Meza, Brittney E Primary Care    Unavailable  
   
                                Meza, Brittney E Attending       Unavailable  
   
                                Meza, Brittney E Admitting       Unavailable  
   
                                Meza, Brittney E Admitting       Unavailable  
   
                                Meza, Brittney E Primary Care    Unavailable  
   
                                Meza, Brittney E Attending       Unavailable  
   
                                Meza, Brittney E Primary Care    Unavailable  
   
                                Meza, Brittney E Attending       Unavailable  
   
                                Meaz, Brittney E Admitting       Unavailable  
   
                                Meza, Brittney E Primary Care    Unavailable  
   
                                Meza, Brittney E Attending       Unavailable  
   
                                Meza, Brittney E Admitting       Unavailable  
   
                                Meza, Brittney E Primary Care    Unavailable  
   
                                DaniloKathi ferrell L    Admitting       Unavailable  
   
                                DaniloKathi L    Attending       Unavailable  
   
                                Meza, Brittney E Primary Care    Unavailable  
   
                                Meza, Brittney E Attending       Unavailable  
   
                                Meza, Brittney E Admitting       Unavailable  
   
                                VISCI, BREE A Attending       Unavailable  
   
                                DANILO, KATHI      Attending       Unavailable  
   
                                DANILO, KATHI      Attending       Unavailable  
   
                                VISCI, BREE A Referring       Unavailable  
   
                                VISCI, BREE A Attending       Unavailable  
   
                                Dot NGUYEN, Jyoti Roberson Attending         
Unavailable  
   
                                Lorenzo Kirby MD Unavailable     [phone]  
   
                                ROQUE HARTMAN Attending       Unavailable  
   
                                DASHAWNOULROQUE ARAGON Referring       Unavailable  
   
                                MEZA, BRITTNEY E Primary Care    Unavailable  
   
                                ROQUE HARTMAN Attending       Unavailable  
   
                                MEZA, BRITTNEY E Primary Care    Unavailable  
   
                                LORENZO KIRBY Attending       Unavail  
able  
   
                                MEZA, BRITTNEY E Primary Care    Unavailable  
  
  
  
Allergies  
  
  
                                                    Allergy   
Classification                          Reported   
Allergen(s)               Allergy Type              Date of   
Onset                     Reaction(s)               Facility  
   
                                                      
(19 sources)                            Acetaminophen;   
Translations:   
[acetaminophen] Drug Allergy    2024      [facility]  
   
                                                      
(19 sources)                            Propoxyphene;   
Translations:   
[propoxyphene]  Drug Allergy    2024      [facility]  
  
  
  
Medications  
Current Medications  
  
  
  
                      Medication Drug Class(es) Dates      Sig (Normalized) Sig   
(Original)  
   
                                                    clobetasol   
propionate 0.5 mg/ml   
topical cream  
(1 source)                Corticosteroid            Start:   
2024  
End:   
10-                                          clobetasol (Temovate)   
0.05 % cream   
Indications: Vaginal   
pain Apply 1   
application topically   
in the morning and 1   
application before   
bedtime. Do all this   
for 7 days. Apply to   
affected area twice a   
day. 45 g 2024   
10/01/2024 Active  
   
                                                    estradiol 1 mg /   
norethindrone   
acetate 0.5 mg oral   
tablet  
(20 sources)              Estrogen                  Start:   
2025  
End:   
2026                                          estradiol-norethindro  
ne (ActivElla) 1-0.5   
MG tablet   
Indications:   
Postmenopausal HRT   
(hormone replacement   
therapy) Take 1   
tablet by mouth Daily   
30 tablet 11   
2025   
Active  
  
  
  
                                Start: 2024                 Estradiol-Nore  
thindrone Acet 1-0.5 mg   
tablet Active 1 TAB PO Daily May 8th, 2024   
12:00am FreeTextSi tablet Orally Once   
a day; Note: Source Status: Taking;   
Provider: Kwabena Lynn (NPI: [id number])  
   
                                Start: 2024                 Estradiol-Nore  
thindrone Acet 1-0.5 mg   
tablet Active 1 TAB PO Daily May 7th, 2024   
11:00pm FreeTextSi tablet Orally Once   
a day; Note: Source Status: Taking;   
Provider: Kwabena Lynn (NPI: [id number])  
   
                                        Start: 2024   take 1 tablet by roma  
th   
once daily                              Estradiol-Norethindrone Acet Active 1 TA  
B   
PO Daily May 8th, 2024 12:00am   
FreeTextSi tablet Orally Once a day;   
Note: Source Status: Taking; Provider:   
Kwabena Lynn (NPI: [id number])  
   
                                                    Start: 2024  
End: 2025                                     estradiol-norethindrone (Act  
ivElla) 1-0.5   
MG tablet Indications: Postmenopausal HRT   
(hormone replacement therapy) Take 1   
tablet by mouth in the morning. 30 tablet   
11 2024 Discontinued   
(Reorder)  
   
                                                            take 1 tablet by roma  
th   
once daily                              Mimvey 1-0.5 mg tablet Take 1 tablet by   
mouth once daily. Active  
   
                                                            take 1 tablet by roma  
th   
every twenty-four hours                 Mimvey 1-0.5 MG 1 tablet Orally Once a d  
ay   
Active  
   
                                                            take 1 tablet by roma  
th   
every twenty-four hours                   
   
                                                            take 1 tablet by roma  
th   
every twenty-four hours                   
  
  
  
                                                    famotidine 40 mg   
oral tablet  
(20 sources)                            Histamine-2   
Receptor Antagonist       Start: 10-         take 1 tablet   
by mouth at   
bedtime                                 famotidine   
(Pepcid) 40 MG   
tablet Take 40 mg   
by mouth at   
bedtime   
10/12/2024 Active  
  
  
  
                                        Start: 2024   take 1 tablet by roma  
th once   
daily at bedtime                        Famotidine (Pepcid Ac) 20 mg tablet   
Active 20 MG PO Daily May 7th, 2024   
11:00pm FreeTextSi tablet every   
HS; Note: Source Status: Taking;   
Provider: Kwabena Lynn (NPI:   
[id number])  
  
  
  
                                                    fluconazole 150 mg oral   
tablet  
(14 sources)    Azole Antifungal Start: 2025                 fluconazole (  
Diflucan)   
150 MG tablet   
Indications:   
Vulvovaginal Candidiasis   
1 tab now-repeat in 4   
days then take 1 pill   
weekly for 8 weeks 10   
tablet 2025 Active  
  
  
  
                                                    Start: 2024  
End: 2024                                     fluconazole (Diflucan) 150 M  
G tablet   
Indications: Vaginal yeast infection   
Take 1 tablet (150 mg) by mouth   
every 3rd (third) day if needed   
(take one tablet now and repeat in   
48-72 hours as needed) for up to 3   
days Repeat in 7 days if symptoms   
persist. 2 tablet 2024 Active  
   
                                        Start: 2023   take 1 tablet by roma  
th every   
twenty-four hours                         
  
  
  
                                                    ibuprofen 800 mg   
oral tablet  
(15 sources)                            Nonsteroidal   
Anti-inflammatory Drug                  Start:   
2024                              take 1 tablet   
by mouth every   
eight hours as   
needed for   
pain                                    Ibuprofen 800 mg   
tablet Active 800   
MG PO Q8H as   
needed for pain 30   
7 2024   
12:00am  
   
                                                    lidocaine 0.05   
mg/mg medicated   
patch  
(15 sources)                            Antiarrhythmic, Amide   
Local Anesthetic                        Start:   
2024                              apply 1 dose   
topically once   
daily                                   Lidocaine   
(Lidoderm) 5 %   
adhesive   
patch,medicated   
Active 1 PATCH   
TOPICAL Daily 15   
14 2024   
12:00am leave on   
most painful area   
for up to 12 hrs  
   
                                                    mebendazole 100   
mg chewable   
tablet  
(2 sources)               Antihelminthic            Start:   
10-  
End: 2024                                     mebendazole   
(Vermox) 100 MG   
chewable tablet   
Indications:   
Diarrhea,   
unspecified type   
Chew 1 tablet (100   
mg) in the morning   
and 1 tablet (100   
mg) before   
bedtime. Do all   
this for 3 days. 6   
tablet 1   
10/31/2024   
2024 Active  
   
                                                    metoprolol   
tartrate 50 mg   
oral tablet  
(1 source)                              beta-Adrenergic   
Blocker                                 Start:   
2025                              take 1 tablet   
by mouth every   
hour                                    metoprolol   
tartrate   
(Lopressor) 50 mg   
tablet   
Indications:   
New-onset angina   
(CMS-HCC) Take 1   
tablet by mouth 1   
hour prior to CT   
coronary angiogram   
1 tablet   
2025 Active  
   
                                                    John Day (No   
Known Home Meds)  
(1 source)                                          Start:   
2020                                          John Day (No Known   
Home Meds) Active   
2020   
12:00am  
   
                                                    nystatin 100   
unt/mg topical   
ointment  
(20 sources)              Polyene Antifungal        Start:   
2024  
End: 2025                                     nystatin   
(Mycostatin)   
ointment   
Indications:   
Vulvar itching   
Apply topically 2   
(two) times a day   
Thin amount in   
combination with   
Triamcinolone   
equal parts 30 g   
2024 Active  
  
  
  
                                                    Start: 2024  
End: 2024           take 4 mL by mouth four times daily   
   
                                Start: 2023 take 4 mL by mouth four times   
daily   
   
                                Start: 2023 take 4 mL by mouth four times   
daily   
  
  
  
                                                    omeprazole 40 mg   
delayed release   
oral capsule  
(20 sources)                            Proton Pump   
Inhibitor                 Start: 2025         take 1 capsule   
by mouth once   
daily                                   Omeprazole 40 mg   
capsule,delayed   
release(DR/EC) Active   
40 MG PO Daily    
1:00am  
  
  
  
                                Start: 2024                 OMEPRAZOLE PO   
2024 Active  
   
                                                    Start: 2024  
End: 2025                         take 1 tablet by mouth once   
daily in the morning                    Omeprazole 20 mg tablet,delayed   
release (DR/EC) Discontinued MG PO   
May 8th, 2024 12:00am 2025 9:49am FreeTextSi   
tablet every am; Note: Source   
Status: Taking; Provider: Kwabena Lynn (NPI: [id number])  
   
                                                      
End: 2023                         take 1 capsule by mouth once   
daily before mealtime                   omeprazole (PriLOSEC) 20 mg DR   
capsule Take 1 capsule (20 mg) by   
mouth once daily in the morning.   
Take before meals. Do not crush or   
chew. Active  
   
                                                                Omeprazole 20 MG  
 1 tablet every am   
Active  
  
  
  
                                                    ondansetron 4 mg   
disintegrating oral   
tablet  
(13 sources)                            Serotonin-3   
Receptor   
Antagonist                              Start:   
10-  
End:   
2025                              take 1   
tablet by   
mouth   
every   
eight   
hours                                   Ondansetron 4 mg   
tablet,disintegrating   
Active 4 MG PO Q8H    
9:48am  
   
                                                    sulfamethoxazole 800   
mg / trimethoprim   
160 mg oral tablet  
(15 sources)                            Dihydrofolate   
Reductase   
Inhibitor   
Antibacterial,   
Sulfonamide   
Antimicrobial                           Start:   
10-                                          sulfamethoxazole-trimeth  
oprim (Bactrim DS)   
800-160 MG per tablet 1   
tablet 10/14/2024 Active  
  
  
  
                                                    Start: 10-  
End: 2024                         take 1 tablet by mouth   
twice daily                             Sulfamethoxazole-Trimethoprim 800-160 mg  
   
tablet Discontinued 1 TAB PO Twice daily  12:00am 2024   
12:40pm  
   
                                        Start: 12-   take 1 tablet by roma  
th   
every twelve hours                      Bactrim -160 MG 1 tablet Orally Tw  
ice a   
day for 5 days 15 Dec, 2023 Active  
  
  
  
                                                    triamcinolone acetonide   
0.001 mg/mg topical   
ointment  
(11 sources)    Corticosteroid  Start: 2024                 triamcinolone   
(Kenalog)   
0.1 % ointment   
Indications: Vulvar   
itching Apply topically 2   
(two) times a day 30 g   
2024 Active  
  
  
  
Completed/Discontinued Medications  
  
  
  
                      Medication Drug Class(es) Dates      Sig (Normalized) Sig   
(Original)  
   
                                                    acetaminophen 300 mg /   
codeine phosphate 30   
mg oral tablet  
(20 sources)              Opioid Agonist            Start:   
2022                              take 1 tablet by   
mouth three times   
daily as needed                           
  
  
  
                                        Start: 2022   take 1 tablet by roma  
th   
three times daily as   
needed                                  Acetaminophen-Codeine 300-30mg   
acetaminophen-codeine 300-30mg, 1 (one)   
Tablet three times daily, as needed # 60,   
2022, No Refill. Active oral three   
times daily, as needed for 0 *Pick   
strength-form from [facility] for eRX*  Active  
   
                                                    Start: 2022  
End: 2025                         take 1 tablet by mouth   
three times daily as   
needed                                  Acetaminophen-Codeine 300-30 mg tablet   
Discontinued TAB PO May 8th, 2024 12:00am   
May 9th, 2024 11:48am FreeTextSig:   
acetaminophen-codeine 300-30mg, 1 (one)   
Tablet three times daily, as needed # 60,   
2022, No Refill. Active oral three   
times daily, as needed; Note: Source   
Status: Taking*Pick strength-form from   
[facility] for e[facility]*; Refills: 0; Provider:   
Kwabena Lynn (NPI: [id number])  
   
                                                    Start: 10-  
End: 2020                         take 1 tablet by mouth   
once daily as needed for   
pain                                    Acetaminophen-Codeine 300-30 mg tablet   
Discontinued 300 MG PO Daily as needed   
for Pain 2017 12:00am   
2020 2:22pm  
  
  
  
                                                    ALPRAZolam 0.25 mg   
oral tablet  
(20 sources)              Benzodiazepine            Start: 2024  
End: 2025                         take 1 tablet   
by mouth twice   
daily as needed   
for anxiety                             Alprazolam 0.25 mg   
tablet Discontinued   
0.25 MG PO Twice   
daily as needed for   
anxiety 60  2:45pm 2024 4:35pm  
  
  
  
                                                    Start: 2024  
End: 2024                         take 1 tablet by mouth once   
daily as needed for anxiety             Alprazolam 0.25 mg tablet   
Discontinued 0.25 MG PO Daily as   
needed for anxiety 30 30 May 9th,   
2024 11:46am 2024 9:29am  
   
                                        Start: 2024   take 1 tablet by roma  
th every   
twenty-four hours                       ALPRAZolam 0.25 MG 1 tablet Orally   
daily for 30 days    
Active  
   
                                        Start: 12-   take 1 tablet by roma  
 every   
twenty-four hours                       ALPRAZolam 0.25 MG 1 tablet Orally   
daily for 30 days 15 Dec, 2023   
Active  
   
                                        Start: 2023   take 1 tablet by [facility] every   
twenty-four hours                       ALPRAZolam 0.25 MG 1 tablet Orally   
daily for 30 days    
Active  
   
                                        Start: 2023   take 1 tablet by roma  
 every   
twenty-four hours                       ALPRAZolam 0.25 MG 1 tablet Orally   
daily for 30 days 26 Sep, 2023   
Active  
   
                                        Start: 2022   take 1 tablet by [facility] every   
twenty-four hours                       ALPRAZolam 0.25 MG 1 tablet Orally   
daily for 30 days    
Active  
  
  
  
                                                    {1 (ascorbic acid   
7540 MG /   
polyethylene glycol   
3350 04984 MG /   
potassium chloride   
1200 MG / sodium   
ascorbate 40958 MG /   
sodium chloride 3200   
MG Powder for Oral   
Solution) / 1   
(polyethylene glycol   
3350 410424 MG /   
potassium chloride   
1000 MG / sodium   
chloride 2000 MG /   
sodium sulfate 9000   
MG Powder for Oral   
Solution) } Pack   
[Plenvu]  
(1 source)                              Osmotic   
Laxative,   
Vitamin C                               Start:   
10-                                          Plenvu 140 GM dose 1   
pouch at 4pm, dose 2   
pouch A & B at 11pm   
Orally BID for 1 days   
BIN:[id number] PCN: [id number]   
GROUP:[id number]   
ID:[id number] 18   
Oct, 2019 Not-Taking  
   
                                                    cyclobenzaprine   
hydrochloride 10 mg   
oral tablet  
(20 sources)              Muscle Relaxant           Start:   
2024  
End: 2025                         take 1   
tablet by   
mouth every   
eight hours                             Cyclobenzaprine 10 mg   
tablet Discontinued   
10 MG PO Q8H 15    
12:00am 2024 11:52am  
  
  
  
                                                    Start: 2024  
End: 10-                         take 1 tablet by mouth three   
times daily as needed                   Cyclobenzaprine 10 mg tablet   
Discontinued MG PO May 8th, 2024   
12:00am 2024 11:16am   
FreeTextSi tablet Orally three   
times daily prn; Note: Source Status:   
Refill; Refills: 0; Qty: 60 Tablet;   
Provider: Kwabena BARRON  
   
                                Start: 2022                 Cyclobenzaprin  
e HCl - 10 MG Oral   
Tablet TAKE TABLET PRN Quantity: 0   
Refills: 0 Ordered: 4-Oct-2022 DO   
Start : 4-Oct-2022 Active  
  
  
  
                                                    FLUoxetine 10 mg oral   
capsule  
(20 sources)                            Serotonin Reuptake   
Inhibitor                               Start:   
2024  
End:   
2024                              take 1   
capsule by   
mouth once   
daily                                   Fluoxetine 10 mg   
capsule Discontinued   
10 MG PO Daily 30 May   
31st, 2024 1:27pm   
2024   
12:12pm  
   
                                                    methylPREDNISolone 4   
mg oral tablet  
(20 sources)              Corticosteroid            Start:   
2024  
End:   
10-                                          Methylprednisolone 4   
mg tablets,dose pack   
Discontinued 0 PO per   
package directions    
12:00am 2024 9:46am PO PER   
PKG DIR for 6 days  
  
  
  
                                                    Start: 2024  
End: 10-                                     Methylprednisolone Discontin  
ued 0 PO per package directions  12:00am 2024 9:46am PO PER PKG DIR   
for 6 days  
   
                                Start: 2024                 Methylpredniso  
lone Active 0 PO per package directions  12:00am PO PER PKG DIR for 6 days  
   
                                Start: 2023                   
  
  
  
                                                    metroNIDAZOLE 500   
mg oral tablet  
(5 sources)                             Nitroimidazole   
Antimicrobial                           Start:   
2024  
End: 2024                         take 1   
tablet by   
mouth in the   
morning                                 metroNIDAZOLE   
(Flagyl) 500 MG   
tablet   
Indications: BV   
(bacterial   
vaginosis) Take 1   
tablet (500 mg) by   
mouth in the   
morning and 1   
tablet (500 mg)   
before bedtime. Do   
all this for 7   
days. 14 tablet   
2024   
Discontinued   
(Other)  
   
                                                    Plenvu Bowel Prep   
PEG 3350 140g,   
Sodium Ascorbate   
48.11g, Sodium   
Sulfate 9g,   
Ascorbic Acid   
7.54g, Sodium   
Chloride 5.2g,   
Potassium Chloride   
2.2g  
(1 source)                                                      Plenvu Bowel Pre  
p   
PEG 3350 140g,   
Sodium Ascorbate   
48.11g, Sodium   
Sulfate 9g,   
Ascorbic Acid   
7.54g, Sodium   
Chloride 5.2g,   
Potassium Chloride   
2.2g Dose 1 pouch   
at 4pm as directed   
and pouches A and   
B at 11pm as   
directed orally   
one day before   
your procedure for   
1 DAY Not-Taking  
   
                                                    predniSONE 20 mg   
oral tablet  
(20 sources)                                        Start:   
2024  
End: 10-                         take 2   
tablets by   
mouth once   
daily                                   Prednisone 20 mg   
tablet   
Discontinued 40 MG   
PO Daily 10 5 July   
18th, 2024 12:00am   
2024   
9:46am  
  
  
  
                                                    Start: 2024  
End: 10-           take 40 mg by mouth once daily Prednisone Discontinued  
 40 MG PO   
Daily 10 5 July 18th, 2024 12:00am   
2024 9:46am  
   
                                        Start: 2023   take 1 tablet by roma  
th every   
twenty-four hours                       predniSONE 10 MG 1 tablet Orally   
Once a day for 30 day(s) 26 Sep,   
2023 Active  
  
  
  
Problems  
Active Problems  
  
  
                      Problem Classification Problem    Date       Documented Da  
te Episodic/Chronic  
   
                                                    Abdominal pain  
(13 sources)                            Generalized abdominal   
pain; Translations:   
[Generalized abdominal   
pain]                                   Onset:   
  
4                         10-                Episodic  
   
                                                    Anxiety disorders  
(20 sources)                            Anxiety disorder;   
Translations: [Other   
specified anxiety   
disorders]                              Onset:   
  
0                                                   Chronic  
   
                                                    Cardiac dysrhythmias  
(15 sources)                            Multiple premature   
ventricular complexes;   
Translations: [Other   
premature beats]                        Onset:   
  
3                         10-                Chronic  
   
                                                    Cardiac dysrhythmias  
(4 sources)                             Palpitations;   
Translations:   
[Palpitations]                          Onset:   
  
5                         2025                Episodic  
   
                                                    Conditions associated   
with dizziness or   
vertigo  
(4 sources)                             Dizziness;   
Translations:   
[Dizziness and   
giddiness]                              Onset:   
  
5                         2025                Episodic  
   
                                                    Coronary   
atherosclerosis and   
other heart disease  
(5 sources)                             New onset angina;   
Translations: [Angina   
pectoris, unspecified]                  Onset:   
  
5                         2025                Chronic  
   
                                                    Digestive congenital   
anomalies  
(14 sources)                            Disorder of tongue;   
Translations: [Other   
congenital   
malformations of   
tongue]                                                     Chronic  
   
                                                    Disorders of lipid   
metabolism  
(14 sources)                            Hyperlipidemia;   
Translations: [Other   
and unspecified   
hyperlipidemia]                         Onset:   
  
3                         10-                Chronic  
   
                                                    Esophageal disorders  
(2 sources)                             Gastroesophageal   
reflux disease;   
Translations:   
[Gastro-esophageal   
reflux disease without   
esophagitis]                            2025          Chronic  
   
                                                    Genitourinary symptoms   
and ill-defined   
conditions  
(18 sources)                            Dysuria; Translations:   
[Increased frequency   
of urination]                           Onset:   
10-  
4                                                   Episodic  
   
                                                    Headache; including   
migraine  
(1 source)                              Tension-type headache,   
unspecified,   
intractable                                                 Episodic  
   
                                                    Immunizations and   
screening for   
infectious disease  
(6 sources)                             Encounter for   
immunization;   
Translations: [Patient   
encounter status]                       Onset:   
10-  
1                                                   Episodic  
   
                                                    Lymphadenitis  
(4 sources)                             Chronic lymphadenitis;   
Translations: [Chronic   
lymphadenitis, except   
mesenteric]                             Onset:   
  
3                                                   Chronic  
   
                                                    Menopausal disorders  
(2 sources)                             Postmenopausal state;   
Translations: [Hormone   
replacement therapy]                     2025          Episodic  
   
                                                    Mycoses  
(5 sources)                             Candidal stomatitis;   
Translations:   
[Candidiasis of   
vagina]                                                     Episodic  
   
                                                    Nausea and vomiting  
(15 sources)                            Nausea; Translations:   
[Nausea]                                Onset:   
10-  
4                         10-                Episodic  
   
                                                    Other circulatory   
disease  
(4 sources)                             Elevated   
blood-pressure reading   
without diagnosis of   
hypertension;   
Translations:   
[Elevated   
blood-pressure   
reading, without   
diagnosis of   
hypertension]                                               Episodic  
   
                                                    Other connective   
tissue disease  
(4 sources)                             Pain in left lower   
limb; Translations:   
[Pain in left leg]                                          Episodic  
   
                                                    Other connective   
tissue disease  
(4 sources)                             Diastasis of muscle;   
Translations:   
[Separation of muscle   
(nontraumatic), other   
site]                                                       Episodic  
   
                                                    Other connective   
tissue disease  
(1 source)      Other muscle spasm                                 Episodic  
   
                                                    Other female genital   
disorders  
(2 sources)                             Pain in female   
genitalia on   
intercourse;   
Translations:   
[Unspecified   
dyspareunia]                            2025          Chronic  
   
                                                    Other female genital   
disorders  
(4 sources)                             Noninflammatory   
disorder of vulva;   
Translations: [Other   
specified   
noninflammatory   
disorders of vulva and   
perineum]                                                   Episodic  
   
                                                    Other female genital   
disorders  
(13 sources)                            Vaginal irritation;   
Translations: [Other   
specified   
noninflammatory   
disorders of vagina]                     2024          Episodic  
   
                                                    Other female genital   
disorders  
(9 sources)                             Other specified   
noninflammatory   
disorders of vagina;   
Translations:   
[Unspecified   
noninflammatory   
disorder of vagina]                     2024          Episodic  
   
                                                    Other gastrointestinal   
disorders  
(2 sources)                             Diarrhea;   
Translations:   
[Diarrhea,   
unspecified]                            10-          Episodic  
   
                                                    Other gastrointestinal   
disorders  
(1 source)                              Diarrhea, unspecified;   
Translations:   
[Diarrhea,   
unspecified]                            Onset:   
10-  
4                                                   Episodic  
   
                                                    Other lower   
respiratory disease  
(14 sources)                            Dyspnea on exertion;   
Translations:   
[Shortness of breath]                   Onset:   
  
3                         10-                Episodic  
   
                                                    Other nervous system   
disorders  
(1 source)                              Hereditary peripheral   
neuropathy;   
Translations:   
[Unspecified   
hereditary and   
idiopathic peripheral   
neuropathy]                             Onset:   
  
9                                                   Chronic  
   
                                                    Other nutritional;   
endocrine; and   
metabolic disorders  
(7 sources)                             Abnormal weight loss;   
Translations:   
[Abnormal weight loss]                     10-          Episodic  
   
                                                    Other nutritional;   
endocrine; and   
metabolic disorders  
(6 sources)                             Abnormal weight loss;   
Translations: [Loss of   
weight]                                 Onset:   
10-  
4                         10-                Episodic  
   
                                                    Other screening for   
suspected conditions   
(not mental disorders   
or infectious disease)  
(20 sources)                            Patient encounter   
status; Translations:   
[Encounter for   
screening for   
malignant neoplasm of   
colon]                                  2020          Episodic  
   
                                                    Pleurisy;   
pneumothorax;   
pulmonary collapse  
(20 sources)                            Pleurisy;   
Translations:   
[Pleurisy]                              Onset:   
  
3                         10-                Episodic  
   
                                                    Residual codes;   
unclassified  
(16 sources)                            Body mass index 20-24   
- normal;   
Translations: [Body   
Mass Index between   
19-24, adult]                           Onset:   
  
3                         10-                Episodic  
   
                                                    Residual codes;   
unclassified  
(4 sources)                             Family history of   
breast cancer;   
Translations: [Family   
history of malignant   
neoplasm of breast]                                         Episodic  
   
                                                    Residual codes;   
unclassified  
(2 sources)                             Family history of   
coronary   
arteriosclerosis;   
Translations: [Family   
history of ischemic   
heart disease and   
other diseases of the   
circulatory system]                     Onset:   
  
5                         2025                Episodic  
   
                                                    Residual codes;   
unclassified  
(2 sources)                             Family history of   
ischemic heart disease   
and other diseases of   
the circulatory   
system; Translations:   
[Family history of   
ischemic heart disease   
and other diseases of   
the circulatory   
system]                                 Onset:   
  
5                                                   Episodic  
   
                                                    Residual codes;   
unclassified  
(2 sources)                             Body mass index (BMI)   
21.0-21.9, adult;   
Translations: [Body   
mass index (BMI)   
21.0-21.9, adult]                       Onset:   
  
5                                                   Episodic  
   
                                                    Spondylosis;   
intervertebral disc   
disorders; other back   
problems  
(20 sources)                            Sacroiliac joint pain;   
Translations:   
[Sacrococcygeal   
disorders, not   
elsewhere classified]                   Onset:   
  
4                         2024                Episodic  
   
                                                    Substance-related   
disorders  
(19 sources)                            Nicotine dependence;   
Translations:   
[Nicotine dependence,   
cigarettes, with other   
nicotine-induced   
disorders]                              Onset:   
  
8                         2024                Chronic  
   
                                                    Thyroid disorders  
(4 sources)                             Thyroid nodule;   
Translations:   
[Nontoxic single   
thyroid nodule]                         Onset:   
  
4                         2024                Chronic  
   
                                                    Unclassified  
(2 sources)                             Chest pain,   
unspecified /   
R07.9(ICD-9)                            Onset:   
10-  
7                                                     
   
                                                    Unclassified  
(1 source)                              Shortness of breath /   
R06.02(ICD-9)                           Onset:   
10-  
7                                                     
   
                                                    Unclassified  
(2 sources)                             CONTACT W/AND (SUSP)   
EXPOS COVID-19;   
Translations: [CONTACT   
W/AND (SUSP) EXPOS   
COVID-19]                               Onset:   
10-  
2                                                     
   
                                                    Unclassified  
(1 source)                              Low back pain,   
unspecified;   
Translations: [Low   
back pain,   
unspecified]                            Onset:   
  
4                                                     
   
                                                    Viral infection  
(1 source)                              COVID-19;   
Translations:   
[COVID-19]                              Onset:   
10-  
2                                                     
  
  
Past or Other Problems  
  
  
                                                    Problem   
Classification  Problem         Date            Documented Date Episodic/Chronic  
   
                                                    Acute bronchitis  
(4 sources)                             Acute bronchitis;   
Translations: [Acute   
bronchitis,   
unspecified]                            Onset:   
2013                                          Episodic  
   
                                                    Inflammatory diseases   
of female pelvic   
organs  
(2 sources)                             Bacterial vaginosis;   
Translations: [Acute   
vaginitis]                              2024          Episodic  
   
                                                    Nonspecific chest   
pain  
(20 sources)                            Chest pain,   
unspecified;   
Translations: [Chest   
pain]                                   Onset:   
10-                                          Episodic  
   
                                                    Other female genital   
disorders  
(2 sources)                             Vaginal discharge;   
Translations: [Other   
specified   
noninflammatory   
disorders of vagina]                     2024          Episodic  
   
                                                    Other inflammatory   
condition of skin  
(2 sources)                             Pruritus of vulva;   
Translations:   
[Pruritus vulvae]                       2024          Episodic  
   
                                                    Other lower   
respiratory disease  
(5 sources)                             Shortness of breath;   
Translations:   
[Shortness of breath]                   Onset:   
2022                                          Episodic  
   
                                                    Other lower   
respiratory disease  
(4 sources)                             Pleuritic pain;   
Translations:   
[Pleurodynia]                           Onset:   
2018                                          Episodic  
   
                                                    Other nervous system   
disorders  
(1 source)                              Altered sensation of   
skin; Translations:   
[Disturbance of skin   
sensation]                              Onset:   
2019                                          Episodic  
   
                                                    Other nutritional;   
endocrine; and   
metabolic disorders  
(4 sources)                             Hypercalcemia;   
Translations:   
[Hypercalcemia]                           
Resolved:   
2021                                          Chronic  
   
                                                    Residual codes;   
unclassified  
(4 sources)                             Tobacco user;   
Translations:   
[Tobacco use]                           Onset:   
2018                                          Episodic  
   
                                                    Residual codes;   
unclassified  
(4 sources)                             Family history of   
cancer; Translations:   
[Family history of   
malignant neoplasm of   
other organs or   
systems]                                  
Resolved:   
2021                                          Episodic  
   
                                                    Residual codes;   
unclassified  
(2 sources)                             Body mass index (BMI)   
22.0-22.9, adult;   
Translations: [Body   
mass index (BMI)   
22.0-22.9, adult]                       Onset:   
2023                                          Episodic  
   
                                                    Screening and history   
of mental health and   
substance abuse codes  
(14 sources)                            Ex-smoker;   
Translations:   
[Personal history of   
tobacco use]                            Onset:   
08-                10-                Episodic  
   
                                        Comment on above:   quit 2019;   
   
                                                    Unclassified  
(1 source)                              CONTACT W/AND (SUSP)   
EXPOS COVID-19;   
Translations:   
[CONTACT W/AND (SUSP)   
EXPOS COVID-19]                         Onset:   
10-                                            
   
                                                    Unclassified  
(3 sources)                                         Onset:   
10-                10-                  
  
  
  
Results  
  
  
                          Test Name    Value        Interpretation Reference   
Range                                   Facility  
   
                                                    36on 2025   
   
                                        36                  Patient called in   
stating that she had   
Lung surgery done by   
Dr Simons in  at  
[facility] in   
[place]. She would   
like to know if it   
would compatible with   
having  
a MRI done.  
Spoke with ,NP   
she stated that she   
should be fine to get   
MRI there is no  
implantation when   
doing a lung surgery.   
Informed patient of   
this she voiced her  
understanding.      Normal                                  [facility]  
   
                                                    BI MAMMOGRAM SCREENING TOMOS  
YNTHESIS BILATERALon 2025   
   
                                                    BI MAMMOGRAM SCREENING   
TOMOSYNTHESIS BILATERAL                 This is a summary   
report. The complete   
report is available   
in the patient's   
medical record. If   
you cannot access the   
medical record,   
please contact the   
sending organization   
for a detailed fax or   
copy.  
Examination: BI   
MAMMOGRAM SCREENING   
TOMOSYNTHESIS   
BILATERAL  
Clinical History:   
screening  
Technique: Screening   
digital mammography   
study of both breasts   
was performed with   
2-D and 3-D   
tomosynthesis   
imaging. Study was   
compared to the prior   
exam dated 1/3/2024.  
Findings: There is no   
evidence of interval   
dominant spiculated   
mass, grouped   
microcalcifications,   
or skin thickening   
which would be   
suggestive of   
malignancy.  
A few   
benign-appearing   
calcifications are   
seen bilaterally.   
Partially visualized   
axillary lymph nodes   
are suggested   
bilaterally.  
IMPRESSION:  
Impression: No   
specific evidence of   
malignancy seen in   
either breast.  
BIRADS 2 - Benign   
Findings  
DENSITY: The breasts   
are heterogeneously   
dense, which may   
obscure small masses.  
FOLLOW-UP: Routine   
Screening Mammogram  
ELECTRONICALLY SIGNED   
BY: Tej Ndiaye M.D.                Normal                                  Not Available  
   
                                                    Laboratory - Microbiology an  
d Antimicrobial susceptibilityon 2025   
   
                                                    Bacterial vaginosis and   
vaginitis DNA panel   
Probe+sig amp (Vag fld) Negative                        Negative        [facility]  
   
                                                    No Panel Informationon    
   
                                                    Interpretation and   
review of laboratory   
results         Abnormal                                        [facility]  
   
                      Trichomonas, UA Negative                         [facility]  
   
                      Yeast      Positive                         [facility]  
   
                                                                  [facility]  
   
                                                    US thyroidon 2024   
   
                                        US thyroid          [facility] [address]  
  
Ultrasound Report  
Signed  
  
Patient: Kathi Self   
MR#: [id number]  
  
: 1970   
Acct:[id number]  
  
Age/Sex: 54 / F ADM   
Date: 24  
  
Loc:  Room: Type:   
[facility]  
Attending Dr: Brittney Meza MD  
  
Ordering Provider:   
Brittney Meza MD  
Date of Service:   
24  
Accession #:   
([id number] US/US   
thyroid: E04.1 -   
Nontoxic single   
thyroid nodule  
  
  
Copies to: Brittney Meza MD  
  
  
Thyroid Ultrasound  
  
HISTORY: Nontoxic   
thyroid nodule  
  
COMPARISON: None  
  
The RIGHT lobe   
measures 4.7 x 1.4 x   
1.6cm.  
  
LEFT lobe measures   
4.2 x 1.1 x 1.4 cm.  
  
Isthmus has an AP   
dimension of 0.1cm.  
  
Right mid solid   
cystic nodule   
measures up to 11 mm.   
Left mid medial solid   
cystic nodule   
measures up  
to 5 mm. Left   
superior anechoic   
nodule with septation   
measures up to 6 mm.   
Left superior   
anechoic  
nodule with septation   
measures up to 4 mm.   
Right portion of the   
isthmus contains a   
mixed echogenic  
nodule measuring up   
to 7 mm..  
  
No   
microcalcifications   
identified.  
  
Symmetric blood flow   
of the thyroid gland   
identified.  
  
  
ORDER #: [id number]   
US/US thyroid  
IMPRESSION: Bilateral   
thyroid nodules   
measuring up to 11   
mm.  
  
Impression dictated   
by: Zach Le M.D.2024 4:37   
PM  
  
  
Dictation Location:   
[place] [address]  
  
Tech: Jo Watkins  
  
Transcribed By: COLBY   
24 1637  
Dictated By:   
Zach Le DO   
24 1636  
  
Signed By:  
24 1637       Normal                                  [facility]   
Physician   
Group  
   
                                                    CT abdomen pelvis w conon    
   
                                        CT abdomen pelvis w con [facility] [place]  
[address] [place] [address]  
  
CT Scan Report  
Signed  
  
Patient: Kathi Self   
MR#: [id number]  
  
: 1970   
Acct:[id number]  
  
Age/Sex: 54 / F ADM   
Date: 24  
  
Loc: CT Room: Type:   
[facility]  
Attending Dr: Brittney Meza MD  
Copies to: Brittney Meza MD  
  
  
  
Ordering Provider:   
Brittney Meza MD  
Date of Service:   
24  
Accession #:   
([id number]) CT/CT   
abdomen pelvis w con:   
R07.9 - Chest pain,   
unspecified  
([id number]) CT/CT   
chest wo/w con: R07.9   
- Chest pain,   
unspecified  
  
  
  
  
CT chest wo/w con, CT   
abdomen pelvis w con   
2024 4:50 PM  
  
SIGN AND SYMPTOMS:   
Bilateral lower rib   
pain, generalized   
abdominal pain,   
weight loss,   
hematuria  
  
CONTRAST: 90 mL of   
intravenous   
Isovue-300  
  
TECHNIQUE:   
Multidetector CT   
axial slices of the   
chest, abdomen and   
pelvis were   
obtainedwith and  
without IV contrast.   
Multiplanar reformats   
were performed and   
viewed on a separate   
workstation and  
reviewed to further   
define anatomy and   
possible pathology.   
CT was performed with   
one or more of the  
following dose   
reduction techniques:   
Automated exposure   
control, adjustment   
of the mA and/or kV  
according to patient   
size, or use of   
iterative   
reconstruction   
technique.  
  
COMPARISON:   
2008.  
  
FINDINGS:  
Lower neck: There is   
an 11 mm   
hypoattenuating   
structure in the   
right thyroid lobe   
which appears to  
be new when compared   
to the prior exam.   
Nonemergent   
ultrasound follow-up   
is recommended as  
malignancy is not   
excluded.  
Vessels: Within   
normal limits. There   
is no evidence of   
pulmonary embolism.  
Mediastinum and Marie:   
Within normal limits.  
Heart: Normal size.   
No pericardial   
effusion.  
Airways: Within   
normal limits  
Lungs: Emphysematous   
changes are present   
in the lung   
parenchyma. There is   
scarring at the lung  
apices. There is   
pleural-based   
scarring at the left   
lung base.  
Pleura: Within normal   
limits.  
Chest Wall: Within   
normal limits.  
  
Abdomen:  
Liver: There is an 8   
mm focus of   
hypoattenuation in   
the right hepatic   
lobe.  
Bile Ducts: Normal   
caliber.  
Gallbladder: No   
calcified gallstones.   
Normal caliber wall.  
Pancreas: within   
normal limits.  
Spleen: within normal   
limits.  
Adrenals: within   
normal limits.  
Kidneys: within   
normal limits.  
  
Pelvis:  
Reproductive Organs:   
The uterine fundus is   
heterogeneously   
enlarged presumably   
relating to uterine  
fibroids. The largest   
measures 5.6 cm in   
greatest transverse   
dimension.  
Ureters: within   
normal limits.  
Bladder: within   
normal limits.  
  
Bowel: There are   
uncomplicated colonic   
diverticula.  
Mesenteric Lymph   
Nodes: No enlarged   
mesenteric lymph   
nodes.  
Peritoneum: No   
ascites or free air,   
no fluid collection.  
Vessels:   
Atherosclerotic   
changes are noted in   
the abdominal aorta   
and its branches..  
Retroperitoneum:   
within normal limits.  
Abdominal Wall:   
within normal limits.  
Bones: Bilateral L5   
pars defects are   
noted with grade 1   
spondylolisthesis of   
L5 upon S1.  
  
  
  
ORDER #: [id number]   
CT/CT chest wo/w con  
IMPRESSION:  
  
No acute   
cardiopulmonary   
pathology or   
intra-abdominal   
pathology.  
  
There is an 11 mm   
hypoattenuating   
structure in the   
right thyroid lobe   
which appears to be   
new when  
compared to the prior   
exam. Nonemergent   
ultrasound follow-up   
is recommended as   
malignancy is not  
excluded.  
  
Emphysematous changes   
are present in the   
lung parenchyma.  
  
There is an 8 mm   
suspected cyst or   
hemangioma in the   
right hepatic lobe.  
  
The uterine fundus is   
heterogeneously   
enlarged presumably   
relating to uterine   
fibroids. The largest  
measures 5.6 cm in   
greatest transverse   
dimension.  
  
Uncomplicated colonic   
diverticula are   
noted.  
  
Impression dictated   
by: Aguila Phillip M.D.2024 7:56 PM  
  
  
Dictation Location:   
[place] [address]  
  
  
  
Transcribed By: [facility]   
24  
Dictated By: Aguila Phillip II, MD   
24  
  
Signed By:  
24       Normal                                  [facility]   
Physician   
Group  
   
                                                    Bacterial culture w IDOrdere  
d By: Kathi Carrasco on 10-   
   
                                                    Bacteria identified Cx   
Nom (Unsp spec)                         Bacterial culture w   
ID                                      .                   [facility]  
   
                                        Comment on above:   Performed at: [address]   
[id number] Director: Khari Rodriguez PhD, Phone:   
[phone]   
   
                                                    Campy coli+jejuni BD MaxOrde  
red By: Kathi Carrasco on 10-   
   
                                                    C. coli+jejuni tuf gene   
SVITLANA+probe Ql (Stl) Negative                        Negative        [facility]  
   
                                        Comment on above:   Campylobacter test i  
ncludes C. jejuni and C. coli.   
   
                                                    C. coli+jejuni tuf gene   
SVITLANA+probe Ql (Stl)                      Campy coli+jejuni BD   
Max                                     Negative            [facility]  
   
                                        Comment on above:   Campylobacter test i  
ncludes C. jejuni and C. coli.   
   
                                                    Cryptosporidium parv+homin B  
D MaxOrdered By: Kathi Carrasco on 10-   
   
                                                    C. parvum+hominis DNA   
SVITLANA+probe Ql (Stl)                      Cryptosporidium   
parv+homin BD Max                       Negative            [facility]  
   
                                        Comment on above:   Cryptosporidium test  
 includes C. hominis and C. parvum.   
   
                                                    Entamoeba histolytica BD Max  
Ordered By: Kathi Carrasco on 10-   
   
                                                    E. histolytica DNA   
SVITLANA+probe Ql (Stl)                      Entamoeba histolytica   
BD Max                                  Negative            [facility]  
   
                                        Comment on above:   Testing performed by  
 RT-PCR   
   
                                                    Escherichia coli Stx1 and St  
x2 toxin stx1+stx2 genes [Presence] in Stool by SVITLANA   
withOrdered By: Kathi Carrasco on 10-   
   
                                                    E. coli stx1+stx2 genes   
SVITLANA+probe Ql (Stl) Negative                        Negative        [facility]  
   
                                                    Giardia marrufo BD MaxOrdered By  
: Kathi Carrasco on 10-   
   
                                                    G. lamblia DNA SVITLANA+probe   
Ql (Stl)        Giardia marrufo BD Max                 Negative        [facility]  
   
                                                    Ova and Parasite Panelon 10-  
   
   
                                                    Cryptosporidium   
(C.hominis+par  Negative        Normal          Negative        [facility]   
Physician   
Group  
   
                                        Comment on above:   Result Comment: Cryp  
tosporidium test includes C. hominis and   
C. parvum.   
   
                                                            Performed By: #### E  
NT BACT PANEL, OVP ####  
[facility]  
[address] [place] [address] [place]  
#### YERSINIA ONLY ####  
[facility] Acct#[id number]  
,   
   
                      Entamoeba histolytica Negative   Normal     Negative   The  
 [facility]   
Physician   
Group  
   
                                        Comment on above:   Result Comment: Test  
ing performed by RT-PCR  
PERFORMED BY:  
[facility]  
[address] [place] [address]  
[phone]  
PATHOLOGIST MEDICAL DIRECTOR  
DEREK WALKER M.D.   
   
                                                            Performed By: #### E  
NT BACT PANEL, OVP ####  
[facility]  
[address] [place] [address] [place]  
#### YERSINIA ONLY ####  
[facility] Acct#[id number]  
,   
   
                      Giardia lamblia Negative   Normal     Negative   [facility]   
Physician   
Group  
   
                                        Comment on above:   Performed By: #### E  
NT BACT PANEL, OVP ####  
[facility]  
[address]  
#### YERSINIA ONLY ####  
[facility] Acct#[id number]  
,   
   
                                                    Salmonella sp spaO gene [Pre  
sence] in Stool by SVITLANA with probe detectionOrdered   
By:   
Kathi Carrasco on 10-   
   
                                                    Salmonella sp spaO gene   
SVITLANA+probe Ql (Stl) Negative                        Negative        [facility]  
   
                                        Comment on above:   Testing performed by  
 RT-PCR   
   
                                                    Salmonellosis BD MaxOrdered   
By: Kathi Carrasco on 10-   
   
                                                    Salmonella sp spaO gene   
SVITLANA+probe Ql (Stl)                      Salmonella sp spaO   
gene [Presence] in   
Stool by SVITLANA with   
probe detection                         Negative            [facility]  
   
                                        Comment on above:   Testing performed by  
 RT-PCR   
   
                                                    Shigella Tox 1+2 BD MaxOrder  
ed By: Kathi Carrasco on 10-   
   
                                                    E. coli stx1+stx2 genes   
SVITLANA+probe Ql (Stl)                      Escherichia coli Stx1   
and Stx2 toxin   
stx1+stx2 genes   
[Presence] in Stool   
by SVITLANA with                             Negative            [facility]  
   
                                                    Shigella species+EIEC invasi  
on plasmid antigen H ipaH gene [Presence] in Stool   
by   
NAAOrdered By: Kathi Carrasco on 10-   
   
                                                    Shigella species+EIEC   
invasion plasmid antigen   
H ipaH gene SVITLANA+probe Ql   
(Stl)           Negative                        Negative        [facility]  
   
                                        Comment on above:   Shigella sp. test in  
cludes Shigella species and   
Enteroinvasive E. coli (EIEC).   
   
                                                    Shigellosis BD MaxOrdered By  
: Kathi Carrasco on 10-   
   
                                                    Shigella species+EIEC   
invasion plasmid antigen   
H ipaH gene SVITLANA+probe Ql   
(Stl)                                   Shigella species+EIEC   
invasion plasmid   
antigen H ipaH gene   
[Presence] in Stool   
by SVITLANA                                  Negative            [facility]  
   
                                        Comment on above:   Shigella sp. test in  
cludes Shigella species and   
Enteroinvasive E. coli (EIEC).   
   
                                                    Stool Bacterial Panelon 10-3  
1-2024   
   
                      Campylobacter Negative   Normal     Negative   [facility]   
Physician   
Group  
   
                                        Comment on above:   Result Comment: Camp  
ylobacter test includes C. jejuni and C.   
coli.   
   
                                                            Performed By: #### E  
NT BACT PANEL, OVP ####  
[address]  
#### YERSINIA ONLY ####  
[facility] Acct#[id number]  
,   
   
                      Salmonella Species Negative   Normal     Negative   [facility]   
Physician   
Group  
   
                                        Comment on above:   Result Comment: Test  
ing performed by RT-PCR  
PERFORMED BY:  
[place] [address]  
[phone]  
PATHOLOGIST MEDICAL DIRECTOR  
DEREK WALKER M.D.   
   
                                                            Performed By: #### E  
NT BACT PANEL, OVP ####  
[address]  
#### YERSINIA ONLY ####  
[facility] Acct#[id number]  
,   
   
                                                    Shiga Toxin (E coli   
O157+oth)       Negative        Normal          Negative        [facility]   
Physician   
Group  
   
                                        Comment on above:   Performed By: #### E  
NT BACT PANEL, OVP ####  
[address]  
#### YERSINIA ONLY ####  
[facility] Acct#[id number]  
,   
   
                      Shigella Species Negative   Normal     Negative   [facility]   
Physician   
Group  
   
                                        Comment on above:   Result Comment: Shig  
jung sp. test includes Shigella species   
and  
Enteroinvasive E. coli (EIEC).   
   
                                                            Performed By: #### E  
NT BACT PANEL, OVP ####  
[address]  
#### YERSINIA ONLY ####  
[facility] Acct#[id number]  
,   
   
                                                    Yersinia Only Cultureon 10-3  
1-2024   
   
                      Yersinia Result 1 No Yersinia isolated Normal     .         
   [facility]   
Physician   
Group  
   
                                        Comment on above:   Result Comment: Perf  
ormed at:  - [facility] [address] [id number]  
: Khari Rodriguez PhD, Phone: [phone]  
PERFORMED BY:  
[place] [address]  
[phone]  
PATHOLOGIST MEDICAL DIRECTOR  
DEREK WALKER M.D.   
   
                                                            Performed By: #### E  
NT BACT PANEL, OVP ####  
[facility]  
[address] [place] [address] [place]  
#### YERSINIA ONLY ####  
[facility] Acct#[id number]  
,   
   
                      Yersinia Stool Culture Final report Normal     .            
[facility]   
Physician   
Group  
   
                                        Comment on above:   Performed By: #### E  
NT BACT PANEL, OVP ####  
[facility]  
[address] [place] [address] [place]  
#### YERSINIA ONLY ####  
[facility] Acct#[id number]  
,   
   
                                                    Yersinia cultureOrdered By:   
Kathi Carrasco on 10-   
   
                                                    Yersinia sp identified   
Org specific cx Nom   
(Unsp spec)     Yersinia culture                 .               [facility]  
   
                                                    Urinalysis macro (dipstick)   
panel (U)on 10-   
   
                          Bilirubin, UA Negative                  Negative -   
4(70) +++   
mg/dL                                   [facility]  
   
                          Blood, UA    Negative                  Negative -   
50 Vaughn/mcL                              [facility]  
   
                      Clarity, UA Clear                            [facility]  
   
                      Color, UA  Yellow                           [facility]  
   
                          Glucose, UA  Negative                  Negative -   
(110)   
++++ mg/dL                              [facility]  
   
                                                    Interpretation and   
review of laboratory   
results         Normal                                          [facility]  
   
                          Ketones, UA  Negative                  Negative -   
160(16)   
++++ mg/dL                              [facility]  
   
                          Leukocytes, UA Negative                  Negative -   
500+++   
Sophia/mcL                                 [facility]  
   
                          Nitrite, UA  Negative                  Negative -   
Positive                                [facility]  
   
                      pH, UA     6                     5 - 9      [facility]  
   
                          Protein, UA  Negative                  Negative -   
2000(20)   
++++ mg/dL                              [facility]  
   
                      Spec Grav, UA 1.005                 1 - 1.03   [facility]  
   
                          Urobilinogen, UA 0.2                       0.2 - 12   
mg/dL                                   [facility]   
Healthcare  
   
                                                    Alanine aminotransferase [En  
zymatic activity/volume] in Serum or PlasmaOrdered   
By:   
Brittney Meza on 10-   
   
                                                    ALT [Catalytic   
activity/Vol]   21 U/L          Normal                      [facility]  
   
                                        Comment on above:   Performed By: #### C  
MP, CBC, TSH3, LIPASE ####  
[facility]  
[address]   
   
                                                    ALT [Catalytic   
activity/Vol]                           Alanine   
aminotransferase   
[Enzymatic   
activity/volume] in   
Serum or Plasma                                         [facility]  
   
                                                    Albumin [Mass/volume] in Ser  
um or Plasma by Bromocresol green (BCG) dye binding   
methoOrdered By: Brittney Meza on 10-   
   
                                                    Albumin BCG dye   
[Mass/Vol]      4.4 g/dL                        3.5-5.7         [facility]  
   
                                                    Albumin BCG dye   
[Mass/Vol]                              Albumin [Mass/volume]   
in Serum or Plasma by   
Bromocresol green   
(BCG) dye binding   
metho                                   3.5-5.7             [facility]  
   
                                                    Alkaline phosphatase [Enzyma  
tic activity/volume] in Serum or PlasmaOrdered By:   
Brittney Meza on 10-   
   
                                                    ALP [Catalytic   
activity/Vol]   48 U/L          Normal                    [facility]  
   
                                        Comment on above:   Performed By: #### C  
MP, CBC, TSH3, LIPASE ####  
[facility]  
[address] [place] [address]   
   
                                                    ALP [Catalytic   
activity/Vol]                           Alkaline phosphatase   
[Enzymatic   
activity/volume] in   
Serum or Plasma                         [address]              [facility]  
   
                                                    Aspartate aminotransferase [  
Enzymatic activity/volume] in Serum or PlasmaOrdered  
By:   
Brittney Meza on 10-   
   
                                                    AST [Catalytic   
activity/Vol]   23 U/L          Normal          -39           [facility]  
   
                                        Comment on above:   Performed By: #### C  
MP, CBC, TSH3, LIPASE ####  
[facility]  
[address] [place] [address]   
   
                                                    AST [Catalytic   
activity/Vol]                           Aspartate   
aminotransferase   
[Enzymatic   
activity/volume] in   
Serum or Plasma                                        [facility]  
   
                                                    Automated basophil %Ordered   
By: Brittney Meza on 10-   
   
                      Basophils/100 WBC (Bld) 0.5 %      Normal     .          [facility]  
   
                                        Comment on above:   Performed By: #### C  
MP, CBC, TSH3, LIPASE ####  
[facility]  
[address] [place] [address]   
   
                                                    Automated basophil countOrde  
red By: Brittney Meza on 10-   
   
                      Basophils (Bld) [#/Vol] 0.0 10*3/uL Normal     0.0-0.2      
[facility]  
   
                                        Comment on above:   Result Comment: PERF  
ORMED BY:  
[place] [address]  
[phone]  
PATHOLOGIST MEDICAL DIRECTOR  
DEREK WALKER M.D.   
   
                                                            Performed By: #### C  
MP, CBC, TSH3, LIPASE ####  
[address]   
   
                                                    Automated blood monocyte cou  
ntOrdered By: Brittney Meza on 10-   
   
                      Monocytes (Bld) [#/Vol] 0.9 10*3/uL High       0.0-0.8      
[facility]  
   
                                        Comment on above:   Performed By: #### C  
MP, CBC, TSH3, LIPASE ####  
[address]   
   
                                                    Automated eosinophil %Ordere  
d By: Brittney Meza on 10-   
   
                                                    Eosinophils/100 WBC   
(Bld)           1.7 %           Normal          .               [facility]  
   
                                        Comment on above:   Performed By: #### C  
MP, CBC, TSH3, LIPASE ####  
[address]   
   
                                                    Automated eosinophil countOr  
dered By: Brittney Meza on 10-   
   
                                                    Eosinophils (Bld)   
[#/Vol]         0.1 10*3/uL     Normal          0.0-0.45        [facility]  
   
                                        Comment on above:   Performed By: #### C  
MP, CBC, TSH3, LIPASE ####  
[address]   
   
                                                    Automated monocyte %Ordered   
By: Brittney Meza on 10-   
   
                      Monocytes/100 WBC (Bld) 11.7 %     Normal     .          [facility]  
   
                                        Comment on above:   Performed By: #### C  
MP, CBC, TSH3, LIPASE ####  
[address]   
   
                                                    Automated neutrophil %Ordere  
d By: Brittney Meza on 10-   
   
                                                    Neutrophils/100 WBC   
(Bld)           63.8 %          Normal          .               [facility]  
   
                                        Comment on above:   Performed By: #### C  
MP, CBC, TSH3, LIPASE ####  
[address]   
   
                                                    Basophils Auto (Bld) [#/Vol]  
Ordered By: Brittney Meza on 10-   
   
                                        Basophils (Bld) [#/Vol] Automated basoph  
il   
count                                   0.0-0.2             [facility]  
   
                                                    Basophils/100 WBC Auto (Bld)  
Ordered By: Brittney Meza on 10-   
   
                      Basophils/100 WBC (Bld) Automated basophil %            .   
         [facility]  
   
                                                    Bilirubin.total [Mass/volume  
] in Serum or PlasmaOrdered By: Brittney Meza on   
10-   
   
                      Bilirubin [Mass/Vol] 0.5 mg/dL  Normal     0.3-1.0    [facility]  
   
                                        Comment on above:   Performed By: #### C  
MP, CBC, TSH3, LIPASE ####  
[facility]  
[address]   
   
                                        Bilirubin [Mass/Vol] Bilirubin.total   
[Mass/volume] in   
Serum or Plasma                         0.3-1.0             [facility]  
   
                                                    Calcium [Mass/volume] in Ser  
um or PlasmaOrdered By: Brittney Meza on 10-   
   
                      Calcium [Mass/Vol] 9.3 mg/dL  Normal     8.6-10.3   [facility]  
   
                                        Comment on above:   Performed By: #### C  
MP, CBC, TSH3, LIPASE ####  
[facility]  
[address]   
   
                                        Calcium [Mass/Vol]  Calcium [Mass/volume  
]   
in Serum or Plasma                      8.6-10.3            [facility]  
   
                                                    Carbon dioxide, total [Moles  
/volume] in Serum or PlasmaOrdered By: Brittney Meza   
on   
10-   
   
                      CO2 [Moles/Vol] 28.7 mmol/L Normal     21.0-31.0  [facility]  
   
                                        Comment on above:   Performed By: #### C  
MP, CBC, TSH3, LIPASE ####  
[facility]  
[address]   
   
                                        CO2 [Moles/Vol]     Carbon dioxide, tota  
l   
[Moles/volume] in   
Serum or Plasma                         21.0-31.0           [facility]  
   
                                                    Chloride [Moles/volume] in S  
pati or PlasmaOrdered By: Brittney Meza on 10-  
   
   
                      Chloride [Moles/Vol] 102 mmol/L Normal          [facility]  
   
                                        Comment on above:   Performed By: #### C  
MP, CBC, TSH3, LIPASE ####  
[facility]  
[address]   
   
                                        Chloride [Moles/Vol] Chloride   
[Moles/volume] in   
Serum or Plasma                                       [facility]  
   
                                                    Complete Blood Count Auto Di  
ffon 10-   
   
                                                    Mean Corpuscular HGB   
Conc            33.5 g/dL       Normal          32.0-35.0       [facility]   
Physician   
Group  
   
                                        Comment on above:   Performed By: #### C  
MP, CBC, TSH3, LIPASE ####  
[facility]  
[address] [place] [address]   
   
                      NRBC%      0.1 /100{WBC} Normal     0-0.5      [facility]   
Physician   
Group  
   
                                        Comment on above:   Performed By: #### C  
MP, CBC, TSH3, LIPASE ####  
[facility]  
[address] [place] [address]   
   
                                                    Comprehensive Metabolic Pane  
sheryl 10-   
   
                      Albumin [Mass/Vol] 4.4 g/dL   Normal     3.5-5.7    [facility]   
Physician   
Group  
   
                                        Comment on above:   Performed By: #### C  
MP, CBC, TSH3, LIPASE ####  
[address]   
   
                                                    GFR/1.73 sq M.predicted   
MDRD (S/P/Bld) [Vol   
rate/Area]      mL/min/{1.73_m2} Normal                          [facility]   
Physician   
Group  
   
                                        Comment on above:   Performed By: #### C  
MP, CBC, TSH3, LIPASE ####  
[address]   
   
                                                    Creatinine [Mass/volume] in   
Serum or PlasmaOrdered By: Brittney Meza on   
10-   
   
                      Creatinine [Mass/Vol] 1.05 mg/dL Normal     0.60-1.20  [facility]  
   
                                        Comment on above:   Performed By: #### C  
MP, CBC, TSH3, LIPASE ####  
[facility]  
[address] [place] [address]   
   
                                        Creatinine [Mass/Vol] Creatinine   
[Mass/volume] in   
Serum or Plasma                         0.60-1.20           [facility]  
   
                                                    Eosinophils Auto (Bld) [#/Vo  
l]Ordered By: Brittney Meza on 10-   
   
                                                    Eosinophils (Bld)   
[#/Vol]                                 Automated eosinophil   
count                                   0.0-0.45            [facility]  
   
                                                    Eosinophils/100 WBC Auto (Bl  
d)Ordered By: Brittney Meza on 10-   
   
                                                    Eosinophils/100 WBC   
(Bld)                                   Automated eosinophil   
%                                       .                   [facility]  
   
                                                    Erythrocyte distribution wid  
th Auto (RBC) [Ratio]Ordered By: Brittney Meza on   
10-   
   
                                                    Erythrocyte distribution   
width (RBC) [Ratio]                     Erythrocyte   
distribution width   
[Ratio] by Automated   
count                                   11.9-15.3           [facility]  
   
                                                    Erythrocyte distribution wid  
th [Ratio] by Automated countOrdered By: Brittney Meza on   
10-   
   
                                                    Erythrocyte distribution   
width (RBC) [Ratio] 13.8 %          Normal          11.9-15.3       [facility]  
   
                                        Comment on above:   Performed By: #### C  
MP, CBC, TSH3, LIPASE ####  
[facility]  
[address]   
   
                                                    Erythrocytes [#/volume] in B  
lood by Automated countOrdered By: Brittney Meza on   
10-   
   
                      RBC (Bld) [#/Vol] 4.79 10*6/uL Normal     3.60-5.00  [facility]  
   
                                        Comment on above:   Performed By: #### C  
MP, CBC, TSH3, LIPASE ####  
[facility]  
[address]   
   
                                                    Globulin Calc (S) [Mass/Vol]  
Ordered By: Brittney Meza on 10-   
   
                                        Globulin (S) [Mass/Vol] Serum globulin   
measurement by   
calculation   
(mass/volume)                                               [facility]  
   
                                                    Glucose [Mass/volume] in Ser  
um or PlasmaOrdered By: Brittney Meza on 10-   
   
                      Glucose [Mass/Vol] 101 mg/dL  High            [facility]  
   
                                        Comment on above:   ADA recommended refe  
rence rangeRandom Glucose Reference Range   
is dependent on time and content of last meal. Glucose of   
more than 200 mg/dL in a nonstressed, ambulatory subject   
supports the diagnosis of Diabetes Mellitus.   
   
                                                            Result Comment: Rand  
om Glucose Reference Range is dependent   
on time and  
content of last meal. Glucose of more than 200 mg/dL in a  
nonstressed, ambulatory subject supports the diagnosis of  
Diabetes Mellitus.  
ADA recommended reference range   
   
                                                            Performed By: #### C  
MP, CBC, TSH3, LIPASE ####  
[facility]  
[address] [place] [address]   
   
                                        Glucose [Mass/Vol]  Glucose [Mass/volume  
]   
in Serum or Plasma  High                              [facility]  
   
                                        Comment on above:   ADA recommended refe  
rence rangeRandom Glucose Reference Range   
is dependent on time and content of last meal. Glucose of   
more than 200 mg/dL in a nonstressed, ambulatory subject   
supports the diagnosis of Diabetes Mellitus.   
   
                                                    Hematocrit Auto (Bld) [Volum  
e fraction]Ordered By: Brittney Meza on 10-   
   
                                                    Hematocrit (Bld) [Volume   
fraction]                               Hematocrit [Volume   
Fraction] of Blood by   
Automated count                         34.0-46.4           [facility]  
   
                                                    Hematocrit [Volume Fraction]  
 of Blood by Automated countOrdered By: Brittney Meza  
on   
10-   
   
                                                    Hematocrit (Bld) [Volume   
fraction]       42.9 %          Normal          34.0-46.4       [facility]  
   
                                        Comment on above:   Performed By: #### C  
MP, CBC, TSH3, LIPASE ####  
[facility]  
[address] [place] [address]   
   
                                                    Hemoglobin [Mass/volume] in   
BloodOrdered By: Brittney Meza on 10-   
   
                                                    Hemoglobin (Bld)   
[Mass/Vol]      14.4 g/dL       Normal          11.8-15.4       [facility]  
   
                                        Comment on above:   Performed By: #### C  
MP, CBC, TSH3, LIPASE ####  
[facility]  
[address] [place] [address]   
   
                                                    Hemoglobin (Bld)   
[Mass/Vol]                              Hemoglobin   
[Mass/volume] in   
Blood                                   11.8-15.4           [facility]  
   
                                                    Leukocytes [#/volume] correc  
juju for nucleated erythrocytes in Blood by Automated  
   
counOrdered By: Brittney Meza on 10-   
   
                                                    WBC corrected for nucl   
RBC Auto (Bld) [#/Vol] 7.6 10*3/uL                     3.8-11.6        [facility]  
   
                                                    WBC corrected for nucl   
RBC Auto (Bld) [#/Vol]                  Leukocytes [#/volume]   
corrected for   
nucleated   
erythrocytes in Blood   
by Automated coun                       3.8-11.6            [facility]  
   
                                                    Leukocytes [#/volume] in Blo  
od by Automated countOrdered By: Brittney Meza on   
10-   
   
                      WBC (Bld) [#/Vol] 7.6 10*3/uL Normal     3.8-11.6   [facility]  
   
                                        Comment on above:   Performed By: #### C  
MP, CBC, TSH3, LIPASE ####  
[facility]  
[address] [place] [address]   
   
                                                    Lipase [Enzymatic activity/v  
olume] in Serum or PlasmaOrdered By: Brittney Meza on  
  
10-   
   
                                                    Lipase [Catalytic   
activity/Vol]   14.0 U/L        Normal          11.0-82.0       [facility]  
   
                                        Comment on above:   Performed By: #### C  
MP, CBC, TSH3, LIPASE ####  
[address]   
   
                                                    Lipase [Catalytic   
activity/Vol]                           Lipase [Enzymatic   
activity/volume] in   
Serum or Plasma                         11.0-82.0           [facility]  
   
                                                    Lymphocytes Auto (Bld) [#/Vo  
l]Ordered By: Brittney Meza on 10-   
   
                                                    Lymphocytes (Bld)   
[#/Vol]                                 Lymphocytes   
[#/volume] in Blood   
by Automated count                      1.00-4.8            [facility]  
   
                                                    Lymphocytes [#/volume] in Bl  
ood by Automated countOrdered By: Brittney Meza on   
10-   
   
                                                    Lymphocytes (Bld)   
[#/Vol]         1.7 10*3/uL     Normal          1.00-4.8        [facility]  
   
                                        Comment on above:   Performed By: #### C  
MP, CBC, TSH3, LIPASE ####  
[address]   
   
                                                    Lymphocytes/100 WBC Auto (Bl  
d)Ordered By: Brittney Meza on 10-   
   
                                                    Lymphocytes/100 WBC   
(Bld)                                   Lymphocytes/100   
leukocytes in Blood   
by Automated count                      .                   [facility]  
   
                                                    Lymphocytes/100 leukocytes i  
n Blood by Automated countOrdered By: Brittney Meza   
on   
10-   
   
                                                    Lymphocytes/100 WBC   
(Bld)           22.3 %          Normal          .               [facility]  
   
                                        Comment on above:   Performed By: #### C  
MP, CBC, TSH3, LIPASE ####  
[address]   
   
                                                    MCH Auto (RBC) [Entitic mass  
]Ordered By: Brittney Meza on 10-   
   
                                        MCH (RBC) [Entitic mass] MCH [Entitic ma  
ss] by   
Automated count                         24.7-34.3           [facility]  
   
                                                    MCH [Entitic mass] by Automa  
juju countOrdered By: Brittney Meza on 10-   
   
                      MCH (RBC) [Entitic mass] 30.0 pg    Normal     24.7-34.3    
[facility]  
   
                                        Comment on above:   Performed By: #### C  
MP, CBC, TSH3, LIPASE ####  
[facility]  
[address]   
   
                                                    MCHC Auto (RBC) [Mass/Vol]Or  
dered By: Brittney Meza on 10-   
   
                      MCHC (RBC) [Mass/Vol] 33.5 g/dL             32.0-35.0  [facility]  
   
                                        MCHC (RBC) [Mass/Vol] MCHC [Mass/volume]  
 by   
Automated count                         32.0-35.0           [facility]  
   
                                                    MCV Auto (RBC) [Entitic vol]  
Ordered By: Brittney Meza on 10-   
   
                                        MCV (RBC) [Entitic vol] MCV [Entitic vol  
ume]   
by Automated count                                    [facility]  
   
                                                    MCV [Entitic volume] by Auto  
mated countOrdered By: Brittney Meza on 10-   
   
                      MCV (RBC) [Entitic vol] 89.6 fL    Normal          F  
[facility]  
   
                                        Comment on above:   Performed By: #### C  
MP, CBC, TSH3, LIPASE ####  
[facility]  
[address] [place] [address]   
   
                                                    Monocytes Auto (Bld) [#/Vol]  
Ordered By: Brittney Meza on 10-   
   
                                        Monocytes (Bld) [#/Vol] Automated blood   
monocyte count      High                0.0-0.8             [facility]  
   
                                                    Monocytes/100 WBC Auto (Bld)  
Ordered By: Brittney Meza on 10-   
   
                      Monocytes/100 WBC (Bld) Automated monocyte %            .   
         [facility]  
   
                                                    Neutrophils Auto (Bld) [#/Vo  
l]Ordered By: Brittney Meza on 10-   
   
                                                    Neutrophils (Bld)   
[#/Vol]                                 Neutrophils   
[#/volume] in Blood   
by Automated count                      1.8-7.7             [facility]  
   
                                                    Neutrophils [#/volume] in Bl  
ood by Automated countOrdered By: Brittney Meza on   
10-   
   
                                                    Neutrophils (Bld)   
[#/Vol]         4.9 10*3/uL     Normal          1.8-7.7         [facility]  
   
                                        Comment on above:   Performed By: #### C  
MP, CBC, TSH3, LIPASE ####  
[facility]  
[address] [place] [address] [place]   
   
                                                    Neutrophils/100 WBC Auto (Bl  
d)Ordered By: Brittney Meza on 10-   
   
                                                    Neutrophils/100 WBC   
(Bld)                                   Automated neutrophil   
%                                       .                   [facility]  
   
                                                    No Panel InformationOrdered   
By: Brittney Meza on 10-   
   
                      Estimated GFR (CKD-EPI) > 60.0 mL/Min                       
  [facility]  
   
                                                    Pharmacy Creatinine   
Clearance (Chem N/A                                             [facility]  
   
                                                    Nucleated erythrocytes [Pres  
ence] in Blood by Automated countOrdered By: Brittney Meza   
on 10-   
   
                                                    Nucleated RBC Auto Ql   
(Bld)           0.1 /100{WBC}                   0-0.5           [facility]  
   
                                                    Nucleated RBC Auto Ql   
(Bld)                                   Nucleated   
erythrocytes   
[Presence] in Blood   
by Automated count                      0-0.5               [facility]  
   
                                                    Platelet mean volume Auto (B  
ld) [Entitic vol]Ordered By: Brittney Meza on   
10-   
   
                                                    Platelet mean volume   
(Bld) [Entitic vol]                     Platelet mean volume   
[Entitic volume] in   
Blood by Automated   
count                                   6.3-10.7            [facility]  
   
                                                    Platelet mean volume [Entiti  
c volume] in Blood by Automated countOrdered By:   
Brittney Meza on 10-   
   
                                                    Platelet mean volume   
(Bld) [Entitic vol] 8.6 fL          Normal          6.3-10.7        [facility]  
   
                                        Comment on above:   Performed By: #### C  
MP, CBC, TSH3, LIPASE ####  
[facility]  
[address] [place] [address] [place]   
   
                                                    Platelets Auto (Bld) [#/Vol]  
Ordered By: Brittney Meza on 10-   
   
                                        Platelets (Bld) [#/Vol] Platelets [#/vol  
ume]   
in Blood by Automated   
count                                   150-450             [facility]  
   
                                                    Platelets [#/volume] in Bloo  
d by Automated countOrdered By: Brittney Meza on   
10-   
   
                      Platelets (Bld) [#/Vol] 247 10*3/uL Normal     150-450      
[facility]  
   
                                        Comment on above:   Performed By: #### C  
MP, CBC, TSH3, LIPASE ####  
[facility]  
[address]   
   
                                                    Potassium [Moles/volume] in   
Serum or PlasmaOrdered By: Brittney Meza on   
10-   
   
                      Potassium [Moles/Vol] 4.2 mmol/L Normal     3.5-5.1    [facility]  
   
                                        Comment on above:   Performed By: #### C  
MP, CBC, TSH3, LIPASE ####  
[facility]  
[address]   
   
                                        Potassium [Moles/Vol] Potassium   
[Moles/volume] in   
Serum or Plasma                         3.5-5.1             [facility]  
   
                                                    Protein [Mass/volume] in Ser  
um or PlasmaOrdered By: Brittney Meza on 10-   
   
                      Protein [Mass/Vol] 7.2 g/dL   Normal     6.4-8.9    [facility]  
   
                                        Comment on above:   Performed By: #### C  
MP, CBC, TSH3, LIPASE ####  
[facility]  
[address] [place] [address]   
   
                                        Protein [Mass/Vol]  Protein [Mass/volume  
]   
in Serum or Plasma                      6.4-8.9             [facility]  
   
                                                    RBC Auto (Bld) [#/Vol]Ordere  
d By: Brittney Meza on 10-   
   
                                        RBC (Bld) [#/Vol]   Erythrocytes   
[#/volume] in Blood   
by Automated count                      3.60-5.00           [facility]  
   
                                                    Serum globulin measurement b  
y calculation (mass/volume)Ordered By: Brittney Meza   
on   
10-   
   
                      Globulin (S) [Mass/Vol] 2.8 g/dL   Normal                [facility]  
   
                                        Comment on above:   Performed By: #### C  
MP, CBC, TSH3, LIPASE ####  
[facility]  
[address]   
   
                                                    Serum or plasma albumin/glob  
ulin mass ratioOrdered By: Brittney Meza on   
10-   
   
                                                    Albumin/Globulin [Mass   
ratio]          1.6 {ratio}     Normal                          [facility]  
   
                                        Comment on above:   Performed By: #### C  
MP, CBC, TSH3, LIPASE ####  
[facility]  
[address]   
   
                                                    Albumin/Globulin [Mass   
ratio]                                  Serum or plasma   
albumin/globulin mass   
ratio                                                       [facility]  
   
                                                    Serum or plasma anion gap de  
terminationOrdered By: Brittney Meza on 10-   
   
                      Anion gap [Moles/Vol] 11.5 mmol/L Normal     6.0-15.0   [facility]  
   
                                        Comment on above:   Performed By: #### C  
MP, CBC, TSH3, LIPASE ####  
[facility]  
[address]   
   
                                        Anion gap [Moles/Vol] Serum or plasma an  
ion   
gap determination                       6.0-15.0            [facility]  
   
                                                    Sodium [Moles/volume] in Ser  
um or PlasmaOrdered By: Brittney Meza on 10-   
   
                      Sodium [Moles/Vol] 138 mmol/L Normal     136-145    [facility]  
   
                                        Comment on above:   Performed By: #### C  
MP, CBC, TSH3, LIPASE ####  
[facility]  
[address] [place] [address]   
   
                                        Sodium [Moles/Vol]  Sodium [Moles/volume  
]   
in Serum or Plasma                      136-145             [facility]  
   
                                                    Thyrotropin [Units/volume] i  
n Serum or PlasmaOrdered By: Brittney Meza on   
10-   
   
                      TSH Qn     1.04 m[IU]/L Normal     0.45-5.33  [facility]  
   
                                        Comment on above:   Result Comment: PERF  
ORMED BY:  
[place] [address]  
[phone]  
PATHOLOGIST MEDICAL DIRECTOR  
DEREK WALKER M.D.   
   
                                                            Performed By: #### C  
MP, CBC, TSH3, LIPASE ####[facility]   
   
                                        TSH Qn              Thyrotropin   
[Units/volume] in   
Serum or Plasma                         0.45-5.33           [facility]  
   
                                                    Urea nitrogen [Mass/volume]   
in Serum or PlasmaOrdered By: Brittney Meza on   
10-   
   
                      Urea nitrogen [Mass/Vol] 7 mg/dL    Normal     7-25         
[facility]  
   
                                        Comment on above:   Performed By: #### C  
MP, CBC, TSH3, LIPASE ####  
[facility]  
[address]   
   
                                        Urea nitrogen [Mass/Vol] Urea nitrogen   
[Mass/volume] in   
Serum or Plasma                                         [facility]  
   
                                                    WBC Auto (Bld) [#/Vol]Ordere  
d By: Brittney Meza on 10-   
   
                                        WBC (Bld) [#/Vol]   Leukocytes [#/volume  
]   
in Blood by Automated   
count                                   3.8-11.6            [facility]  
   
                                                    Laboratory - Chemistry and C  
hemistry - challengeon 10-   
   
                      Bilirubin Ql (U) Negative                         [facility]  
   
                      Glucose (U) [Mass/Vol] Negative                         [facility]  
   
                      Ketones Ql (U) Negative                         [facility]  
   
                      pH (U)     6.5 [pH]                         [facility]  
   
                                                    Specific gravity (U)   
[Rel density]   1.000                                           [facility]  
   
                                                    Urobilinogen (U)   
[Mass/Vol]      0.2 mg/dL                                       [facility]  
   
                                                    Laboratory - Microbiology an  
d Antimicrobial susceptibilityOrdered By: Brittney Meza on   
10-   
   
                                                    Bacteria identified Cx   
Nom (U)         2 Days                                          [facility]  
   
                                                    Laboratory - Specimen inform  
ationon 10-   
   
                      Appearance (U) cloudy                           [facility]  
   
                      Color (U)  yellow                           [facility]  
   
                                                    Laboratory - Urinalysison 10  
-   
   
                                                    Leukocyte esterase Test   
strip Ql (U)    moderate                                        [facility]  
   
                      Nitrite Ql (U) Negative                         [facility]  
   
                      Protein Ql (U) Negative                         [facility]  
   
                                                    No Panel Informationon 10-14  
-2024   
   
                      Urine Occult Blood tracelyced                       [facility]  
   
                                                    Urine Cultureon 10-   
   
                                                    Bacteria identified Cx   
Nom (U)                                 30,000 colonies/ml   
mixed  
bacterial skin   
contaminants  
2 Days  
PERFORMED BY:  
[facility]  
[address] [place] [address]  
[phone]  
PATHOLOGIST MEDICAL   
DIRECTOR  
DEREK Jaffe                                  [facility]   
Physician   
Group  
   
                                        Comment on above:   Performed By: #### C  
UU ####  
[facility]  
[address] [place] [address] [place]   
   
                                                    Urine cultureOrdered By: Lianne Meza on 10-   
   
                                                    Bacteria identified Cx   
Nom (U)         Urine culture                                   [facility]  
   
                                                    URETHRITIS/DISCHARGE PLUS VA  
GINITIS (HTRX)on 2024   
   
                      ATOPOBIUM VAGINAE 30.202     Abnormal              [facility]  
   
                      ATOPOBIUM VAGINAE Detected   Abnormal              [facility]  
   
                                                    BVAB 2,3 (BACTERIAL   
VAGINOSIS ASSOCIATED   
BACTERIA 2, 3);   
MOBILUNCUS SPP  0.000                                           [facility]  
   
                                                    BVAB 2,3 (BACTERIAL   
VAGINOSIS ASSOCIATED   
BACTERIA 2, 3);   
MOBILUNCUS SPP  Not detected                                    [facility]  
   
                                                    SHELBIE ALBICANS,   
PARAPSILOSIS, TROPICALIS 0.000                                           [facility]  
   
                                                    SHELBIE ALBICANS,   
PARAPSILOSIS, TROPICALIS Not detected                                    [facility]  
   
                      SHELBIE GLABRATA 0.000                            [facility]  
   
                      SHELBIE GLABRATA Not detected                       [facility]  
   
                      SHELBIE KRUSEI 0.000                            [facility]  
   
                      SHELBIE KRUSEI Not detected                       [facility]  
   
                      CHLAMYDIA TRACHOMATIS 0.000                            [facility]  
   
                      CHLAMYDIA TRACHOMATIS Not detected                       [facility]  
[facility]  
   
                      GARDNERELLA VAGINALIS 0.000                            [facility]  
   
                      GARDNERELLA VAGINALIS Not detected                       [facility]  
[facility]  
   
                                                    Interpretation and   
review of laboratory   
results         Abnormal                                        [facility]  
   
                      MEGASPHAERA (TYPES 1, 2) 0.000                              
[facility]  
   
                      MEGASPHAERA (TYPES 1, 2) Not detected                       
  [facility]  
   
                      MYCOPLASMA GENITALIUM 0.000                            [facility]  
   
                      MYCOPLASMA GENITALIUM Not detected                       N  
[facility]  
   
                      NEISSERIA GONORRHOEAE 0.000                            [facility]  
   
                      NEISSERIA GONORRHOEAE Not detected                       N  
[facility]  
   
                      TRICHOMONAS VAGINALIS 0.000                            [facility]  
   
                      TRICHOMONAS VAGINALIS Not detected                       [facility]  
[facility]  
   
                                                                  [facility]  
   
                                                    Laboratory - Microbiology an  
d Antimicrobial susceptibilityon 2024   
   
                                                    Bacterial vaginosis and   
vaginitis DNA panel   
Probe+sig amp (Vag fld) Positive                                        [facility]  
   
                                                    No Panel Informationon    
   
                                                    Interpretation and   
review of laboratory   
results         Abnormal                                        [facility]  
   
                      Trichomonas, UA Negative                         [facility]  
   
                      Yeast      Positive                         [facility]  
   
                                                                  [facility]  
   
                                                    XR lumbar spine 2-3V*on    
   
                                        XR lumbar spine 2-3V* [facility] [place] [id number]  
  
XRay Report  
Signed  
  
Patient: Kathi Self   
MR#: [id number]  
  
: 1970   
Acct:[id number]  
  
Age/Sex: 53 / F ADM   
Date: 24  
  
Loc: XDS Room:   
Type: REG CLI  
Attending Dr: Brittney Meza MD  
Copies to: Brittney Meza MD  
  
  
  
Ordering Provider:   
Brittney Meza MD  
Date of Service:   
24  
Accession #:   
([id number]) XR/XR   
lumbar spine 2-3V*:   
M53.3 -   
Sacrococcygeal   
disorders, not   
elsewhere  
classified  
  
  
  
  
2 views Lumbar Spine  
  
HISTORY: Back pain  
  
COMPARISON: None  
  
POSTSURGICAL CHANGES:   
None  
  
  
BONY ALIGNMENT: Mild   
lateral curvature  
HYPERMOBILITY:No   
bending imaging.  
LISTHESIS:Mild L5-S1   
anterolisthesis   
secondary to L5 pars   
defects  
  
FRACTURE: None  
  
DEGENERATIVE CHANGES:   
Moderate L5-S1   
spondylosis. Lower   
lumbar facet   
degeneration.   
Moderate  
bilateral SI joint   
degenerative changes.  
  
SOFT TISSUES:   
Unremarkable  
  
BONY   
MINERALIZATION:[place]qua  
te  
  
  
ORDER #: [id number]   
XR/XR lumbar spine   
2-3V*  
IMPRESSION: Mild   
L5-S1 spondylosis   
with mild   
anterolisthesis and   
L5 pars defects.  
  
Impression dictated   
by: Zach Le M.D.2024 2:59 PM  
  
  
Dictation Location:   
[place] [address]  
  
  
  
Transcribed By: [facility]   
24 1459  
Dictated By:   
Zach Le DO   
24 1447  
  
Signed By:  
24 1459       Normal                                  [facility]   
Physician   
Group  
   
                                                    CT CARDIAC SCORINGon   
023   
   
                                        CT CARDIAC SCORING  Addendum Begins  
MRN: [id number]  
Patient Name: KATHI SELF  
  
ADDENDUM:  
Technical: The   
following is to serve   
as an over-read for   
an  
unenhanced cardiac   
CT, to evaluate the   
extra vascular   
structures.  
  
Contiguous unenhanced   
CT sections are   
performed from the   
level of the  
aidan to the upper   
abdomen.  
  
  
  
Findings: The   
visualized portions   
of both lungs are   
clear aside from  
linear scarring or   
atelectasis in the   
left lung base and   
tiny areas  
of pleural thickening   
or scarring in the   
left mid and lower   
thorax..  
  
There is no sign of   
pathologic lymph node   
enlargement. There is   
no  
pericardial or   
pleural effusion.  
  
Images through the   
upper abdomen are   
unremarkable.  
  
The visualized   
osseous and soft   
tissue structures of   
the chest wall  
are intact.  
  
  
  
Impression: The extra   
vascular structures   
have an unremarkable   
CT  
appearance.  
Electronically signed   
by: JAMES WOLF MD   
Addendum Ends  
MRN: [id number]  
Patient Name: KATHI SELF  
  
STUDY:  
CT CARDIAC SCORING;   
2023 11:07 am  
  
INDICATION:  
chest pain Shortness   
of breath on   
exertion.  
  
COMPARISON:  
None.  
  
ACCESSION NUMBER(S):  
[id number]  
  
ORDERING CLINICIAN:  
ROQUE HARTMAN  
  
TECHNIQUE:  
Using prospective ECG   
gating, CT scan of   
the coronary arteries   
was  
performed without   
intravenous contrast.   
Coronary calcium   
scoring was  
performed according   
to the method of   
Agatston.  
  
CT Dose-Length   
Product (DLP): 70   
mGy*cm  
CT Dose Reduction   
Employed: Yes,   
prospective gating,   
iterative  
reconstruction.  
  
FINDINGS:  
The score and   
distribution of   
calcium in the   
coronary arteries is   
as  
follows:  
  
LM 0  
LAD 20  
LCx 0  
RCA 0  
  
Total 20  
  
The visualized   
ascending thoracic   
aorta measures 2.8 cm   
in diameter.  
The heart is normal   
in size. No   
pericardial effusion   
is present.  
  
  
The main pulmonary   
artery, right and   
left pulmonary artery   
are normal  
in size.  
  
  
IMPRESSION:  
1. Coronary artery   
calcium score of 20*.  
2. SCHNEIDER 89th   
percentile** for age,   
gender, and race in   
asymptomatic  
patients.  
  
  
*Coronary Artery   
Agatston score  
  
Score risk  
Very low 1-99  
Mildly increased   
100-299  
Moderately increased   
>300  
Moderate to severely   
increased >800  
  
Pat et al. JCCT   
2016   
(http://dx.doi.org/10  
.1016/j.jcct.2016.11.  
003)  
  
** SCHNEIDER Percentile  
In general, greater   
than 75th percentile   
for age, gender, and   
race is  
considered to be a   
higher relative risk   
and higher lifetime   
risk  
condition.  
Greater than 75th   
percentile=moderate   
to severely increased   
relative  
risk irrespective of   
the score. Advise   
using SCHNEIDER 10 year   
CHD risk  
calculator below for   
better discrimination   
of risk.  
  
SCHNEIDER 10-Year CHD Risk   
with Coronary Artery   
Calcification can be  
calcuate using link   
below  
https://www.schneider-nhlb  
i.org/MESACHDRisk/Mes  
aRiskScore/RiskScore.  
aspx  
Harvinder et al.   
JACC 2015   
(http://dx.doi.org/10  
.1016/j.j  
acc.2015.08.035)  
  
Reading Cardiologist:   
Dr. Myke Plascencia,   
Date: 2023 12:36   
pm  
Electronically signed   
by: JAMES WOLF MD     Normal                                  [facility]  
   
                                                    CT Cardiac Scoringon   
023   
   
                      CT Cardiac Scoring            Normal                [facility]   
600 DO  
Work Phone:   
[phone]  
0  
   
                                                    Office Visit (Cardiology)on   
2023   
   
                                        Follow-up visit     Diagnoses/Problems  
Assessed  
Chest pain (786.50)   
(R07.9)  
Hyperlipidemia   
(272.4) (E78.5)  
Premature ventricular   
contraction (427.69)   
(I49.3)  
Shortness of breath   
on exertion (786.05)   
(R06.02)  
Former smoker   
(V15.82) (Z87.891)  
quit 2019  
Body mass index (BMI)   
of 22.0 to 22.9 in   
adult (V85.1)   
(Z68.22)  
Orders  
Chest pain, Shortness   
of breath on exertion  
CT Cardiac Scoring;   
Status:Hold For -   
Scheduling; Requested   
for:2023;  
Patient taking   
Metformin or   
Derivatives? : No  
Radiologist to   
Determine Optimal   
Study : Y  
What are the   
patient's signs and   
symptoms? : chest   
pain  
SocHx: Former smoker  
Tobacco Use   
Screening;   
Status:Complete;   
Done: 2023  
Patient Instructions  
Please bring all   
medicines, vitamins,   
and herbal   
supplements with you   
when you come to the   
office.  
Prescriptions will   
not be filled unless   
you are compliant   
with your follow up   
appointments or have   
a follow up   
appointment scheduled   
as per instruction of   
your physician.   
Refills should be   
requested at the time   
of your visit.  
Calcium Score  
Follow up in 9 months  
  
The provider reviewed   
the following test(s)   
and result(s) with   
the patient:   
Myocardial perfusion   
study  
  
Chief Complaint  
KATHI SELF is being   
seen for a 2 month   
follow-up of.  
  
History of Present   
Illness  
Patient is here for   
follow-up continue   
management for   
previous evaluation   
for atypical chest   
pain. Has been   
following the patient   
since  for   
intermittent atypical   
chest pain. She   
underwent several   
cardiac evaluation   
with echocardiogram   
and stress test all   
of it appears to be   
reassuring. Her chest   
pain and   
nonexertional. She   
does describe some   
radiation to the left   
arm. There is no   
clear associated,   
precipitated or   
alleviated symptoms.   
She did undergo   
resection of lung   
bleb back in  by   
Dr. Patel. She also   
had been on Prilosec   
for some dyspepsia.   
She was referred for   
a stress test where   
she was able to   
exercise for 10   
minutes. Did not have   
any EKG changes. And   
was clinically   
negative test.  
ASSESSMENT:  
1. Atypical chest   
pain appears   
musculoskeletal or   
pleuritic the patient   
had a remote history   
of resection of lung   
bleb. Recent stress   
test is reassuring.   
Patient continues to   
complain of   
intermittent episodes   
of chest pain.   
Following last office   
visit I did recommend   
trial of   
beta-blockers but the   
patient did not want   
to do that  
2. reformed smoker  
3. hyperlipidemia.   
Does not meet   
criteria for   
treatment recent lab   
work noted and   
reviewed with her  
4. minor shortness   
breath related to   
prior tobacco use   
functional class I   
with excellent   
exercise tolerance  
5. Documentation of   
few premature   
ventricular   
contractions, felt to   
be benign. In the   
past with no   
recurrence  
RECOMMENDATION:  
1. Reviewed the   
results of her GXT   
and lab work and   
reassured her cardiac  
2. I recommended   
calcium scoring for   
risk assessment   
considering her   
continued complaint   
and concern about   
underlying ischemic   
heart disease  
3. I discussed with   
her second opinion   
and or invasive   
evaluation but the   
patient declined  
4. I advised her to   
notify me with   
changes of cardiac   
status or symptoms I   
will see her back in   
9 months  
  
Surgical History  
Problems  
History of Complete   
colonoscopy  
History of Lung   
surgery  
History of   
Tonsillectomy with   
adenoidectomy  
Current Meds  
  
Medication   
NameInstruction  
Acetaminophen-Codeine   
#3 300-30 MG Oral   
TabletTAKE 1 TABLET   
BY MOUTH THREE TIMES   
A DAY AS NEEDED  
ALPRAZolam 0.25 MG   
Oral TabletTAKE   
TABLET PRN  
Cyclobenzaprine HCl -   
10 MG Oral TabletTAKE   
TABLET PRN  
Omeprazole 20 MG Oral   
Capsule Delayed   
ReleaseTAKE 1 CAPSULE   
Daily  
Allergies  
Medication  
No Known Drug   
Allergies  
Recorded By: Melody Kaminski; 2022   
3:53:25 PM  
Social History  
Problems  
Caffeine use (V49.89)   
(Z78.9)  
2 cups coffee daily,   
couple sodas daily  
Consumes alcohol   
(V49.89) (Z78.9)  
social  
Former smoker   
(V15.82) (Z87.891)  
quit 2019  
No illicit drug use  
Review of Systems  
Constitutional: not   
feeling tired.  
Cardiovascular:   
palpitations, but no   
intermittent leg   
claudication and as   
noted in HPI.  
Respiratory: no cough   
and no shortness of   
breath.  
Gastrointestinal: no   
change in bowel   
habits and no blood   
in stools.  
Integumentary: no   
skin rashes.  
Neurological: no   
seizures and no   
frequent falls.  
All other systems   
have been reviewed   
and are negative for   
complaint.  
  
Vitals  
Vital Signs  
Recorded: 2023   
03:46PM  
Heart Rate88, L   
Radial  
[id number], LUE,   
Sitting  
[id number], LUE,   
Sitting  
Height5 ft 4 in  
Jxtprt900 lb  
BMI Olqyuxgdxp41.66   
kg/m2  
BSA Calculated1.64  
Tobacco Useb) No  
PHQ-2 #1. Over the   
last 2 weeks have you   
felt down, depressed   
or hopeless? (If yes,   
answer PHQ-9 below)No  
PHQ-2 #2. Over the   
last 2 weeks have you   
felt little interest   
or pleasure in doing   
things? (If yes,   
answer PHQ-9 below)No  
Falls Screening (Age   
18+)c) Not medically   
indicated  
Physical Exam  
Constitutional: alert   
and in no acute   
distress.  
Neck: neck is supple,   
symmetric, trachea   
midline, no masses   
and no thyromegaly .  
Pulmo (more content   
not included)...    Normal                                   [facility]  
   
                                                    Tobacco Screening.on   
023   
   
                                                    Adult depression   
screening assessment No                                              [facility]   
[facility]  
y 250 DO  
Work Phone:   
[phone]  
0  
   
                                        Fall risk assessment c) Not medically   
indicated                                                   [facility]   
[facility]  
y 250 DO  
Work Phone:   
[phone]  
0  
   
                      Tobacco use status CPHS b) No                            M  
[facility]   
[facility]  
y 250 DO  
Work Phone:   
[phone]  
0  
   
                                                    SCREENING MAMMOGRAM W/KANU,   
BILATERAL*on 2022   
   
                                                    SCREENING MAMMOGRAM   
W/KANU, BILATERAL*                      CLINICAL HISTORY:   
Screening Mammogram  
COMPARISON:   
2021,   
2020, and   
10/3/2019.  
TECHNIQUE: 2D and 3D   
Tomosynthesis of the   
right and left   
breasts was   
performed.  
  
FINDINGS:  
DENSITY:   
Heterogeneously   
dense.  
There are no   
suspicious masses,   
areas of suspicious   
microcalcifications   
or areas  
of architectural   
distortion   
identified. No   
evidence of skin   
thickening.  
  
IMPRESSION:  
BIRADS 1 : NEGATIVE,   
NORMAL INTERVAL   
FOLLOW UP.  
  
  
Board certified   
radiologist.   
Accredited by the ACR   
and FDA.  
  
MAMMOGRAPHY IS VERY   
IMPORTANT TO YOUR   
HEALTH.  
  
CURRENT AMERICAN   
COLLEGE OF RADIOLOGY   
AND NATIONAL   
COMPREHENSIVE CANCER   
NETWORK GUIDELINES  
RECOMMENDS ANNUAL   
MAMMOGRAPHY BEGINNING   
AT AGE 39.  
  
THIS FACILITY USUALLY   
USES A REMINDER   
SYSTEM TO ENSURE ALL   
POSITIONS RECEIVED   
REMINDER  
NOTIFICATIONS AT THE   
TIME BASED ON THE   
RECOMMENDATIONS OF   
THIS EXAM.  
  
  
  
Report reported and   
signed by Siva Ashraf on 2022   
0944                Normal                                  [facility]   
Medical   
Specialist  
   
                                                    Cardiac Stress Teston 2022   
   
                                        Cardiac Stress Test [address],   
[address], [place] [address]  
Tel [phone] Fax   
[phone]  
Exercise Stress Test  
Patient Name: KATHI SELF Ordering   
Physician: [id number]   
Roque Hartman  
Study Date:   
2022 Reading   
Physician: [id number]   
Victoria Pang MD,  
FACC  
MRN/PID: [id number]   
Supervising   
Physician: [id number]   
Sathish Hutchison MD  
Accession/Order#:   
[id number] Referring   
Physician: ROQUE HARTMAN  
YOB: 1970 PCP: Brittney Meza  
Gender: F Fellow:  
Height: 162.56 cm   
Nurse: Uma Cedeno RN  
Weight: 63.05 kg   
Sonographer: NA  
BSA: 1.68 m2   
Technologist:  
BMI: 23.86 Additional   
Staff:  
kg/m2  
Age: 52 years cc   
report to:  
Patient Location: cc   
report to: [id number]   
Roque Hartman  
Study Type: Cardiac   
Stress Test  
Diagnosis/ICD:   
R07.9-Chest pain,   
unspecified;   
R06.02-Shortness of   
breath  
Indication: Chest   
Pain  
Procedure/CPT: Stress   
Test   
Interpretation-80916;   
Stress Test   
Supervision-32629  
Falls Risk: Low:   
Patient has low risk   
for sustaining a   
fall; environmental   
safety interventions   
in place.  
Study Details:   
Correct procedure and   
correct patient   
verified verbally.  
Patient History:  
Allergies: None.  
Patient Performance:   
The peak heart rate   
achieved was 171 bpm,   
which was 103 % of   
the age predicted   
target heart rate of   
167 bpm. The resting   
blood pressure was   
126/86 mmHg with a   
heart rate of 80 bpm.   
The standing blood   
pressure was 124/86   
mmHg with a heart   
rate of 77 bpm. The   
patient's functional   
capacity was above   
average. The symptoms   
resolved with rest.   
The blood pressure   
response was normal.   
The test was   
terminated due to:   
fatigue.  
Baseline ECG: Resting   
ECG showed normal   
sinus rhythm.  
Stress Stage Data:  
+-----------------+--  
-+------+-------+  
   HR  Sys BP Contreras   
BP   
+-----------------+--  
-+------+-------+  
 Baseline Resting  80   
 126  86    
+-----------------+--  
-+------+-------+  
 Baseline Standing 77   
 124  86    
+-----------------+--  
-+------+-------+  
 Stage I  136 144  82   
   
+-----------------+--  
-+------+-------+  
 Stage II  136 158   
 88    
+-----------------+--  
-+------+-------+  
 Stage III  129 172   
 88    
+-----------------+--  
-+------+-------+  
 Stage IV  171 178   
 90    
+-----------------+--  
-+------+-------+  
Recovery ECG: The   
heart rate recovery   
was normal.  
+------------+---+---  
---+-------+  
   HR  Sys BP Contreras   
BP   
+------------+---+---  
---+-------+  
 Recovery I  130 178   
 90    
+------------+---+---  
---+-------+  
 Recovery II  153 168   
 86    
+------------+---+---  
---+-------+  
 Recovery  148   
 82    
+------------+---+---  
---+-------+  
 Recovery IV  93  138   
 80    
+------------+---+---  
---+-------+  
 Recovery V  93  122   
 78    
+------------+---+---  
---+-------+  
Stress Echo: There   
are no stress induced   
regional wall motion   
abnormalities.  
Summary:  
1. 1_normal exercise   
tolerance test after   
completing 10 minutes   
on a José Miguel protocol   
and achieving 102% of   
predicted maximal   
heart rate and a   
maximum workload of   
11.7 METS.  
2_no chest pain,   
cardiac arrhythmias   
or ischemic EKG   
changes noted with   
exercise, patient has   
good exercise   
tolerance for age   
with normal heart   
recovery and   
achievement of Blanchard   
treadmill score of   
10+ which is   
favorable.  
[id number] Victoria Pang MD, [facility]  
Electronically signed   
on 2022 at   
6:13:21 PM  
*** Final ***       Normal                                  [facility]  
   
                      Cardiac Stress Test                                  [facility]  
[place]   
[facility]   
600 DO  
Work Phone:   
[phone]  
   
                                                    LIPID PANEL, STANDARDon 11-   
   
                      Cholesterol [Mass/Vol] 225 mg/dL  High       <200       [facility]  
[facility]   
[facility]  
   
                                        Comment on above:   Order Comment: FASTI  
NG:YES  
FASTING: YES   
   
                                                            Performed By: #### 7  
600 ####  
[facility] [address], [address] [place] [address]  
Medical Director: Enrique Alcantar MD   
   
                                                    Cholesterol in HDL   
[Mass/Vol]      71 mg/dL        Normal          > OR = 50       [facility]  
   
                                        Comment on above:   Order Comment: FASTI  
NG:YES  
FASTING: YES   
   
                                                            Performed By: #### 7  
600 ####  
[facility] [address], [address] [place] [address]  
Medical Director: Enrique Alcantar MD   
   
                                                    Cholesterol in LDL   
[Mass/Vol]      136 mg/dL       High                            [facility]  
   
                                        Comment on above:   Order Comment: FASTI  
NG:YES  
FASTING: YES   
   
                                                            Result Comment: Refe  
rence range: <100  
Desirable range <100 mg/dL for primary prevention;  
<70 mg/dL for patients with CHD or diabetic patients  
with > or = 2 CHD risk factors.  
LDL-C is now calculated using the Santy-Franko  
calculation, which is a validated novel method providing  
better accuracy than the Friedewald equation in the  
estimation of LDL-C.  
Santy SHOEMAKER et al. ECTOR. 2013;310(19): 3944-0756  
(http://education.iPierian/faq/YUW620)   
   
                                                            Performed By: #### 7  
600 ####  
[facility] [address], [address] [place] [address]  
Medical Director: Enrique Alcantar MD   
   
                                                    Cholesterol.total/Choles  
terol in HDL [Mass   
ratio]          3.2 {ratio}     Normal          <5.0            [facility]  
   
                                        Comment on above:   Order Comment: FASTI  
NG:YES  
FASTING: YES   
   
                                                            Performed By: #### 7  
600 ####  
[facility] [address], [address] [place] [address]  
Medical Director: Enrique Alcantar MD   
   
                      NON HDL CHOLESTEROL 154 mg/dL (calc) High       <130        
 [facility]  
   
                                        Comment on above:   Order Comment: FASTI  
NG:YES  
FASTING: YES   
   
                                                            Result Comment: For   
patients with diabetes plus 1 major ASCVD   
risk  
factor, treating to a non-HDL-C goal of <100 mg/dL  
(LDL-C of <70 mg/dL) is considered a therapeutic  
option.   
   
                                                            Performed By: #### 7  
600 ####  
[facility] [address], [address] [place] [address]  
Medical Director: Enrique Alcantar MD   
   
                      Triglyceride [Mass/Vol] 82 mg/dL   Normal     <150       [facility]  
[facility]  
   
                                        Comment on above:   Order Comment: FASTI  
NG:YES  
FASTING: YES   
   
                                                            Performed By: #### 7  
600 ####  
[facility] [address], [address] [place] [address]  
Medical Director: Enrique Alcantar MD   
   
                                                    Radiologyon 2022   
   
                      XR Chest 2 Views            Normal                [facility]   
[facility]   
600 DO  
Work Phone:   
[phone]  
0  
   
                                                    Office Visit (Cardiology)on   
11-   
   
                                        Follow-up visit     Diagnoses/Problems  
Assessed  
Chest pain (786.50)   
(R07.9)  
Former smoker   
(V15.82) (Z87.891)  
quit 2019  
Hyperlipidemia   
(272.4) (E78.5)  
Premature ventricular   
contraction (427.69)   
(I49.3)  
Shortness of breath   
on exertion (786.05)   
(R06.02)  
Body mass index (BMI)   
of 23.0 to 23.9 in   
adult (V85.1)   
(Z68.23)  
Orders  
Chest pain,   
Hyperlipidemia,   
Shortness of breath   
on exertion  
Lipid Panel;   
Status:Active -   
Retrospective   
Authorization;   
Requested   
for:2022;  
Chest pain, Shortness   
of breath on exertion  
Cardiac Stress Test;   
Status:Hold For -   
Scheduling,Retrospect  
dixon Authorization;  
Requested   
for:2022;  
IO EKG   
Electrocardiogram- 12   
Lead;   
Status:Complete;   
Done: 2022  
Shortness of breath   
on exertion  
Xray Chest 2 View PA   
+ Lateral;   
Status:Hold For -   
Scheduling,Retrospect  
dixon  
Authorization;   
Requested   
for:2022;  
Radiologist to   
Determine Optimal   
Study : Y  
What are the   
patient's signs and   
symptoms? : dyspnea  
SocHx: Former smoker  
Tobacco Use   
Screening;   
Status:Complete;   
Done: 2022  
Patient Instructions  
Please bring all   
medicines, vitamins,   
and herbal   
supplements with you   
when you come to the   
office.  
Prescriptions will   
not be filled unless   
you are compliant   
with your follow up   
appointments or have   
a follow up   
appointment scheduled   
as per instruction of   
your physician.   
Refills should be   
requested at the time   
of your visit.  
GXT  
Chest x ray  
Lipid  
Follow up in 2 months  
  
The provider reviewed   
the following test(s)   
and result(s) with   
the patient: ECG  
  
Chief Complaint  
KATHI SELF is being   
seen for a   
cardiovascular   
evaluation.  
KATHI SELF is being   
seen for chest pain.  
  
History of Present   
Illness  
Patient is here for   
cardiovascular   
evaluation for   
symptoms of chest   
pain I documented   
that were similar   
presentation.   
Previous medical   
work-up included a   
stress test and   
echocardiogram which   
was negative. Her   
symptoms felt to be   
either GI in nature   
or musculoskeletal.   
The patient had a   
previous history of   
resection of the lung   
Bleb  
By Dr. Simons .   
She report recent   
increase in frequency   
of her chest pain.   
She will few days ago   
she had an episode   
lasted almost the   
whole day. That when   
she put her hand on   
her chest and pressed   
against the area. She   
intermittent   
shortness of breath.   
She is described   
functional class I.   
There is no clear   
associated,   
precipitating or   
alleviating factors.  
ASSESSMENT:  
1. Atypical chest   
pain appears   
musculoskeletal or   
pleuritic  
2. reformed smoker  
3. hyperlipidemia.   
Does not meet   
criteria for   
treatment  
4. minor shortness   
breath related to   
prior tobacco use  
5. Documentation of   
few premature   
ventricular   
contractions, felt to   
be benign. In the   
past with no   
recurrence  
RECOMMENDATION:  
1. The patient was   
advised to proceed   
with GXT.  
2. I advised her to   
have a chest x-ray   
and lipid profile  
3. I recommended a   
trial of metoprolol  
4. I advised her to   
notify me with   
changes of cardiac   
status or symptoms I   
will see her back in   
2-3  
Follow-up  
  
Active Problems  
Problems  
Chest pain (786.50)   
(R07.9)  
Former smoker   
(V15.82) (Z87.891)  
quit 2019  
Hyperlipidemia   
(272.4) (E78.5)  
Premature ventricular   
contraction (427.69)   
(I49.3)  
Shortness of breath   
on exertion (786.05)   
(R06.02)  
Surgical History  
Problems  
History of Complete   
colonoscopy  
History of Lung   
surgery  
History of   
Tonsillectomy with   
adenoidectomy  
Current Meds  
  
Medication   
NameInstruction  
Acetaminophen-Codeine   
#3 300-30 MG Oral   
TabletTAKE 1 TABLET   
BY MOUTH THREE TIMES   
A DAY AS NEEDED  
ALPRAZolam 0.25 MG   
Oral TabletTAKE   
TABLET PRN  
Cyclobenzaprine HCl -   
10 MG Oral TabletTAKE   
TABLET PRN  
Omeprazole 20 MG Oral   
Capsule Delayed   
ReleaseTAKE 1 CAPSULE   
Daily  
Allergies  
Medication  
No Known Drug   
Allergies  
Recorded By: Melody Kaminski; 2022   
3:53:25 PM  
Social History  
Problems  
Caffeine use (V49.89)   
(Z78.9)  
2 cups coffee daily,   
couple sodas daily  
Consumes alcohol   
(V49.89) (Z78.9)  
social  
Former smoker   
(V15.82) (Z87.891)  
quit 2019  
No illicit drug use  
Review of Systems  
Constitutional: not   
feeling tired.  
Eyes: no eyesight   
problems.  
ENT: no hearing loss   
and no nosebleeds.  
Cardiovascular: chest   
pain and   
palpitations, but no   
intermittent leg   
claudication and as   
noted in HPI.  
Respiratory:   
shortness of breath,   
but no chronic cough.  
Gastrointestinal: no   
change in bowel   
habits and no blood   
in stools.  
Genitourinary: no   
urinary frequency.  
Skin: no skin rashes.  
Neurological:   
dizziness, but no   
seizures and no   
frequent falls.  
Psychiatric: no   
depression and not   
suicidal.  
All other systems   
have been reviewed   
and are negative for   
complaint.  
  
Vitals  
Vital Signs  
Recorded: 2022   
01:31PMRecorded:   
2022 01:30PM  
Heart Rate81,   
Jvzzcu70, Apical  
Msfkammt577, LUE,   
Nttczfj106, RUE,   
Sitting  
Dmhmjozod86, LUE,   
Yrkxnht12, RUE,   
Sitting  
Height5 ft 4 in5 ft 4   
in  
Zuscog263 lb 139 lb  
BMI Vlgykmypvo76.86   
kg/m223.86 kg/m2  
BSA   
Calculated1.681.68  
Tobacco Useb) No  
PHQ-2 #1. Over the   
last 2 weeks have you   
felt down, depre   
(more content not   
included)...        Normal                                   [facility]  
   
                                                    Tobacco Screening.on 11-10-2  
022   
   
                                                    Adult depression   
screening assessment No                                              [facility]   
[facility]   
600 DO  
Work Phone:   
[phone]  
0  
   
                      Tobacco use status CP b) No                            M  
[facility]   
600 DO  
Work Phone:   
[phone]  
0  
   
                                                    ASYMPTOMATIC COVID-19 ANTIGE  
Non 10-   
   
                      EUA Statement SEE BELOW  Normal                [facility]  
   
                                        Comment on above:   Result Comment: This  
 test has not been FDA cleared or   
approved, but has been authorized by the FDA under an   
Emergency Use Authorization (EUA) for use by authorized   
laboratories certified under CLIA that meet the requirements   
to perform moderate or high complexity testing. This test has   
been authorized only for the detection of proteins from   
SARS-CoV-2, not for any other viruses or pathogens. The   
emergency use of this test is authorized for the duration of   
the declaration that circumstances exist justifying the   
authorization of emergency use of in vitro diagnostic tests   
for detection and/or diagnosis of Covid-19 under section   
564(b)(1) of the Act, 21 U.S.C. 360bbb-3(b)(1), unless the   
declaration is terminated or authorization is revoked sooner.   
   
                                                            Performed By: #### C  
VDAGA ####  
[facility]  
[address] [place] [address]  
Dr. Darci Luo   
   
                                                    SARS-CoV-2 (COVID-19)   
RNA SVITLANA+probe Ql (Unsp   
spec)                     Positive                  Critically   
abnormal                  NEGATIVE                  [facility]  
   
                                        Comment on above:   Result Comment: SARS  
-CoV-2 antigen present; does not rule out   
coinfection with other pathogens.   
   
                                                            Performed By: #### C  
VDAGA ####  
[facility]  
[address] [place] [address]  
Dr. Darci Luo   
   
                                                    [facility] 10[facility]0  
6-2022   
   
                                                    SARS-CoV-2 (COVID-19)   
RNA SVITLANA+probe Ql (Unsp   
spec)                     Detected                  Critically   
abnormal                                NOT   
DETECTED                                [facility]  
   
                                        Comment on above:   Result Comment: This  
 test is not yet approved or cleared by   
the [place] FDA. When there are no FDA-approved or   
cleared tests available, and other criteria are met, FDA can   
make tests available under an emergency access mechanism   
called an Emergency Use Authorization (EUA). The EUA for this   
test is supported by the [place] of Health and Human   
Service's declaration that circumstances exist to justify the   
emergency use of in vitro diagnostics for the detection   
and/or diagnosis of the virus that causes COVID-19. This EUA   
will remain in effect for the duration of the COVID-19   
declaration justifying emergency of IVDs, unless it is   
terminated or revoked by the FDA (after which the test may no   
longer be used).   
   
                                                            Performed By: #### C  
[facility] ####  
[facility]  
[address] [place], [place] [address]  
Dr. Darci Luo   
   
                                                    Coding Summary.on 2020  
   
   
                                        Coding Summary.     CODING DATE:   
2020 FINAL  
[facility] STATUS:  
Home (Routine DC)  
PAYOR:  
Self Pay  
APC DESCRIPTION  
5571 Level 1 Imaging   
with Contrast  
5693 Level 3 Drug   
Administration  
5025 Level 5 Type A   
ED Visits  
ADMIT DX:  
REASON FOR VISIT DX:  
R07.9 Chest pain,   
unspecified  
M54.9 Dorsalgia,   
unspecified  
R11.0 Nausea  
FINAL DX:  
PRINCIPAL:  
R09.81 Nasal   
congestion  
SECONDARY:  
M54.9 Dorsalgia,   
unspecified  
PYMT  
PROC APC STAT   
DESCRIPTION DOCTOR   
NAME DATE  
NOTE: The code number   
assigned matches the   
documented diagnosis   
and / or  
procedure in the   
patient's chart.   
However, the   
narrative phrase   
printed from  
the coding software   
may appear   
abbreviated, or   
result in slightly   
different  
terminology.  
Revised Coded By:   
Karlie Alex  
Revised Date Saved:   
2020 04:36 pm Normal                                  [facility]  
   
                                                    Auto Diffon 2020   
   
                      Basophils/100 WBC (Bld) 0.8 %      Normal     0.0-2.0    F  
[facility]  
   
                                        Comment on above:   Order Comment: Order  
 Added by Discern Expert.   
   
                                                            Performed By: #### 2  
[id number], [id number], [id number], 2377108,   
3074766, 37327843, 10946702, 25639617, 45846536, 6125596 ####  
[facility]  
[address] [place] [address]   
   
                                                    Basophils/Leukocytes   
Auto (Bld) [Pure #   
fraction]       0.1 E9/L        Normal          0.0-0.2         [facility]  
   
                                        Comment on above:   Order Comment: Order  
 Added by Discern Expert.   
   
                                                            Performed By: #### 2  
[id number], [id number], 3150795, 5709581,   
9905434, 90905145, 36824945, 79414812, 02702483, 4070431 ####  
[facility]  
[address] [place] [address]   
   
                                                    Eosinophils/100 WBC   
(Bld)           1.1 %           Normal          0.0-8.0         [facility]  
   
                                        Comment on above:   Order Comment: Order  
 Added by Discern Expert.   
   
                                                            Performed By: #### 2  
153106, 0465908, 9506736, 6554063,   
6524964, 88422580, 02174763, 15379530, 31619992, 1249516 ####  
[facility]  
[address] [place] [address]   
   
                                                    Eosinophils/Leukocytes   
Auto (Bld) [Pure #   
fraction]       0.1 E9/L        Normal          0.0-0.5         [facility]  
   
                                        Comment on above:   Order Comment: Order  
 Added by Discern Expert.   
   
                                                            Performed By: #### 2  
689120, 8988113, 1647884, 3956375,   
2452593, 29938454, 19562792, 48454500, 05504231, 0845321 ####  
[facility]  
[address] [place] [address]   
   
                                                    Lymphocytes/100 WBC   
(Bld)           29.5 %          Normal          14.0-50.0       [facility]  
   
                                        Comment on above:   Order Comment: Order  
 Added by Discern Expert.   
   
                                                            Performed By: #### 2  
429261, 1129001, 1232422, 2722237,   
4388627, 83145525, 37893017, 43231645, 10463412, 1344561 ####  
[facility]  
[address] [place] [address]   
   
                                                    Lymphocytes/Leukocytes   
Auto (Bld) [Pure #   
fraction]       2.1 E9/L        Normal          1.0-4.0         [facility]  
   
                                        Comment on above:   Order Comment: Order  
 Added by Discern Expert.   
   
                                                            Performed By: #### 2  
971485, 2067407, 8314574, 8950992,   
8683320, 81525976, 33592251, 76326929, 08646886, 2854291 ####  
[facility]  
[address] [place] [address]   
   
                      Monocytes/100 WBC (Bld) 10.5 %     Normal     4.0-14.0   [facility]  
   
                                        Comment on above:   Order Comment: Order  
 Added by Discern Expert.   
   
                                                            Performed By: #### 2  
046330, 8621346, 2807874, 3867874,   
0581098, 65759770, 15953114, 51741912, 38991739, 9224180 ####  
[facility]  
[address] [place] [address]   
   
                                                    Monocytes/Leukocytes   
Auto (Bld) [Pure #   
fraction]       0.7 E9/L        Normal          0.2-1.0         [facility]  
   
                                        Comment on above:   Order Comment: Order  
 Added by Discern Expert.   
   
                                                            Performed By: #### 2  
694833, 3681001, 0080460, 3933665,   
5583387, 09334036, 86245720, 73594681, 24733755, 8964954 ####  
[facility]  
[address] [place] [address]   
   
                                                    Neutrophils/100 WBC   
(Bld)           58.1 %          Normal          36.0-75.0       [facility]  
   
                                        Comment on above:   Order Comment: Order  
 Added by Discern Expert.   
   
                                                            Performed By: #### 2  
130793, 1603966, 1607206, 8383875,   
0788784, 41742151, 01064348, 47025526, 53900691, 4770080 ####  
[facility]  
[address] [place] [address]   
   
                                                    Neutrophils/Leukocytes   
Auto (Bld) [Pure #   
fraction]       4.1 E9/L        Normal          2.0-7.5         [facility]  
   
                                        Comment on above:   Order Comment: Order  
 Added by Discern Expert.   
   
                                                            Performed By: #### 2  
703518, 5217686, 2175526, 9618737,   
3057883, 75868920, 23028265, 58912946, 10425154, 5010910 ####  
[facility]  
[address] [place] [address]   
   
                                                    [facility] 2020   
   
                      Creatinine [Mass/Vol] 0.9 mg/dL  Normal     0.5-1.3    [facility]  
   
                                        Comment on above:   Performed By: #### 2  
901233, 8679743, 5451427, 0857224,   
6738402, 58646003, 36600647, 71120448, 77672660, 7143915 ####  
[facility]  
[address] [place] [address]   
   
                      Urea nitrogen [Mass/Vol] 14 mg/dL   Normal     5-21         
[facility]  
   
                                        Comment on above:   Performed By: #### 2  
677683, 8322344, 9422655, 5006850,   
3290397, 57235961, 22105342, 93646969, 30895147, 2623420 ####  
[facility]  
[address] [place] [address]   
   
                                                    Urea nitrogen/Creatinine   
[Mass ratio]    16 No Units     Normal          10-20           [facility]  
   
                                        Comment on above:   Performed By: #### 2  
012273, 9694977, 0354555, 5811683,   
4194309, 03203568, 63411383, 35644466, 88917017, 6416653 ####  
[facility]  
[address] [place] [address]   
   
                      Anion gap [Moles/Vol] 13 mmol/L  Normal     6-16       [facility]  
   
                                        Comment on above:   Performed By: #### 2  
457338, 0191703, 7508072, 5609482,   
3891468, 34143050, 91667934, 48164463, 35785264, 2760519 ####  
[facility]  
[address] [place] [address]   
   
                      Calcium [Mass/Vol] 8.8 mg/dL  Low        8.9-11.1   [facility]  
   
                                        Comment on above:   Performed By: #### 2  
748437, 1589294, 1363784, 2602709,   
2596971, 03821571, 80047218, 38854231, 88273284, 0146473 ####  
[facility]  
[address] [place] [address]   
   
                      Chloride [Moles/Vol] 102 mmol/L Normal     101-111    [facility]  
   
                                        Comment on above:   Performed By: #### 2  
552475, 1807844, 8578983, 9512327,   
1113581, 13465499, 16760329, 99040866, 62253878, 4878518 ####  
[facility]  
[address] [place] [address]   
   
                      CO2 [Moles/Vol] 24 mmol/L  Normal     21-31      [facility]  
   
                                        Comment on above:   Performed By: #### 2  
868329, 3241389, 5009247, 8659328,   
0518487, 72753467, 50278164, 52664387, 27172615, 2934964 ####  
[facility]  
[address] [place] [address]   
   
                      Glucose [Mass/Vol] 106 mg/dL  Normal          [facility]  
   
                                        Comment on above:   Result Comment: If t  
his glucose result represents a fasting   
glucose, interpretation should refer to the following   
reference range: 55-99 mg/dL   
   
                                                            Performed By: #### 2  
862500, 8645025, 3366504, 5985878,   
2644580, 10974336, 61454439, 12689298, 32316275, 7358299 ####  
[facility]  
[address] [place] [address]   
   
                      Potassium [Moles/Vol] 4.0 mmol/L Normal     3.5-5.3    [facility]  
   
                                        Comment on above:   Performed By: #### 2  
117884, 0403872, 2411135, 5082722,   
7310565, 63303481, 64732986, 75868099, 88793458, 6143301 ####  
[facility]  
[address] [place] [address]   
   
                      Sodium [Moles/Vol] 135 mmol/L Normal     135-145    [facility]  
   
                                        Comment on above:   Performed By: #### 2  
821373, 6164367, 3759500, 5326434,   
0501715, 92345394, 98819563, 04283765, 88495839, 3633991 ####  
[facility]  
[address] [place] [address]   
   
                                                    CBC w/ Auto Diffon   
0   
   
                                                    Erythrocyte distribution   
width (RBC) [Ratio] 14.0 %          Normal          10.9-14.2       [facility]  
   
                                        Comment on above:   Performed By: #### 2  
570659, 1821660, 1595166, 5637381,   
5823615, 40821250, 03122807, 35692528, 64824557, 7755216 ####  
[facility]  
[address] [place] [address]   
   
                                                    Hematocrit (Bld) [Volume   
fraction]       36.5 %          Normal          34.0-46.0       [facility]  
   
                                        Comment on above:   Performed By: #### 2  
622815, 6680941, 0197833, 5957645,   
8478860, 38677487, 00173346, 88504432, 76015083, 0127893 ####  
[facility]  
[address] [place] [address]   
   
                                                    Hemoglobin (Bld)   
[Mass/Vol]      12.4 g/dL       Normal          12.0-16.0       [facility]  
   
                                        Comment on above:   Performed By: #### 2  
802022, 8329373, 5904899, 0273168,   
0523643, 76144558, 69987978, 59400548, 71042100, 3612172 ####  
[facility]  
[address] [place] [address]   
   
                      MCH (RBC) [Entitic mass] 30.3 pg    Normal     27.0-34.0    
[facility]  
   
                                        Comment on above:   Performed By: #### 2  
410831, 7080699, 2207586, 7710280,   
6788706, 40737226, 28387542, 13507141, 18989133, 7917550 ####  
[facility]  
[address] [place] [address]   
   
                      MCHC (RBC) [Mass/Vol] 34.0 g/dL  Normal     31.4-36.0  [facility]  
   
                                        Comment on above:   Performed By: #### 2  
955436, 8406972, 4638853, 4632566,   
7136840, 11449375, 65108091, 63970647, 32021785, 0231009 ####  
[facility]  
[address] [place] [address]   
   
                      MCV (RBC) [Entitic vol] 89.1 fL    Normal     80.0-100.0 F  
[facility]  
   
                                        Comment on above:   Performed By: #### 2  
181286, 6555617, 6144394, 6794343,   
1855205, 28576827, 68907120, 50739116, 45233620, 8318059 ####  
[facility]  
[address] [place] [address]   
   
                                                    Platelet mean volume   
(Bld) [Entitic vol] 8.3 fL          Normal          6.4-10.8        [facility]  
   
                                        Comment on above:   Performed By: #### 2  
712936, 2630351, 7480345, 8021042,   
6448335, 56019230, 72320693, 93037473, 46690688, 5048597 ####  
[facility]  
[address] [place] [address]   
   
                      Platelets (Bld) [#/Vol] 207.0 E9/L Normal     150.0-500.0   
[facility]  
   
                                        Comment on above:   Performed By: #### 2  
663896, 8830139, 0752561, 8315346,   
7609906, 99405481, 85083503, 13560707, 34568738, 3065972 ####  
[facility]  
[address] [place] [address]   
   
                      RBC (Bld) [#/Vol] 4.1 E12/L  Low        4.3-5.9    [facility]  
   
                                        Comment on above:   Performed By: #### 2  
158601, 4144457, 2725207, 1238168,   
7467244, 73440692, 62278993, 33588963, 93247619, 4487626 ####  
[facility]  
[address] [place] [address]   
   
                                                    WBC corrected for nucl   
RBC Auto (Bld) [#/Vol] 7.1 E9/L        Normal          4.0-11.0        [facility]  
   
                                        Comment on above:   Performed By: #### 2  
196691, 6908434, 7366473, 1354764,   
8541803, 89880480, 61312706, 75477709, 13758803, 9022399 ####  
[facility]  
[address] [place] [address]   
   
                                                    CRPon 2020   
   
                      CRP [Mass/Vol] mg/L       Normal     <=1.9      [facility]  
   
                                        Comment on above:   Performed By: #### 2  
257686, 8264904, 9215432, 3000268,   
4024995, 38791908, 01191111, 77260789, 51955932, 2370790 ####  
[facility]  
[address] [place] [address]   
   
                                                    CTA Cheston 2020   
   
                                        CTA Chest           Exam Date/Time:  
2020 13:55 EDT  
Reason for Exam:  
PE suspected, high   
prob;Other (please   
specify)  
Report  
IMPRESSION: NO   
EVIDENCE OF PULMONARY   
EMBOLI OR ACUTE   
FINDINGS IN THE   
CHEST.  
COPD, WHICH IS BEST   
EVALUATED CLINICALLY.  
EXAM: CTA Chest  
DATE: 2020  
CLINICAL HISTORY: PE   
suspected, high prob.  
COMPARISON: Chest   
pain. History of left   
pneumothorax with   
surgery.  
TECHNIQUE: Spiral   
enhanced images were   
obtained of the chest   
after the infusion of  
approximately 58 mL   
of Isovue 370   
contrast with   
pulmonary artery CTA   
protocol.  
Routine and volume   
rendered images were   
performed on a   
three-dimensional   
workstation.  
All CT scans at this   
facility use dose   
modulation, iterative   
reconstruction,   
and/or  
weight based dosing   
when appropriate to   
reduce radiation dose   
to as low as   
reasonably  
achievable.  
FINDINGS:  
There are no filling   
defects identified   
within the pulmonary   
arterial vasculature   
to  
suggest pulmonary   
emboli.  
Moderately advanced   
centrilobular   
emphysematous changes   
are present,   
predominantly of  
the mid and upper   
lung fields without   
significant labs or   
bowel.  
Postoperative changes   
within the medial   
left lung apex are   
otherwise   
unremarkable.  
Mild scarring and/or   
atelectasis is   
present in the lung   
apices and posterior   
bases.  
There are no other   
significant   
infiltrates,   
worrisome nodules,   
lymphadenopathy,  
pleural or   
pericardial   
effusions.  
There are no   
fractures identified.   
The limited imaging   
of the included upper   
abdomen  
  
Report  
is noncontributory.  
***** FINAL REPORT   
*****  
  
Dictated: 2020   
2:10 pm Renato Ashraf MD  
  
Signed (Electronic   
Signature):   
2020 2:10 pm  
Signed by: Renato Ashraf MD  
Transcribed by: ZIYAD   
Technologist: UMANG  
Technical Comments  
GFR (mL/min/1/73m2)   
60  
Contrast: Isovue 300  
Contrast amount in   
ml's: 58            Normal                                  [facility]  
   
                                                    Consent for Treatmenton    
   
                                        Consent for Treatment [id number]  
008  
[id number]  
.00CD:127           Normal                                  [facility]  
   
                                                    Discharge Instructionson    
   
                                        Discharge Instructions [id number]  
0080  
[id number]  
1.00CD:127          Normal                                  [facility]  
   
                                                    ED Clinical Summaryon 2020   
   
                                        ED Clinical Summary (Inserted Image.   
Unable to display)   
[place] [id number] [phone]  
ED Clinical Summary  
Person Information  
Name: KATHI SELF/[facility]ateus[facility] Age:   
49 Years :   
1970  
Sex: Female Language:   
English PCP: BRITTNEY MEZA MD  
Marital Status:   
 Phone: [phone]  
MRN: [id number]   
Id: FIN: [id number]  
Visit Reason: Chest   
pain; cp Speciality:   
Acuity: 3  
Enc Type: Emergency   
Med Service:   
Emergency  
Arrival: 2020   
12:14:46 Discharge:   
2020 15:17:00   
LOS: 000 03:03  
Checkin: 2020   
12:14:46 Checkout:   
2020 15:17:00   
Dispo Type: Home   
(Routine DC)  
  
EVENTS:  
Event Name Event   
Status Request   
Date/Time Start   
Date/Time Complete   
Date/Time  
Arrive Complete   
2020 12:14:46   
Document Home Meds   
Request 2020   
12:14:46  
Triage Complete   
2020 12:14:46   
2020 12:23:44   
2020 12:23:44  
Dr Exam Complete   
2020 12:16:44   
Registration Complete   
2020 12:16:44   
2020 12:18:02   
2020 13:02:59  
Bed Assign Complete   
2020 12:18:02   
RN Exam Complete   
2020 12:18:02   
2020 12:31:30   
2020 12:31:30  
EKG Complete   
2020 12:18:42   
2020 12:24:00  
Meds Admin Request   
2020 12:31:14  
Pending Labs Request   
2020 12:31:14  
Lab Complete   
2020 12:31:14   
2020 13:12:58  
Patient Care Request   
2020 12:31:14  
Pending Labs Complete   
2020 12:50:26   
2020 12:50:26   
2020 13:12:59  
Lab Complete   
2020 12:50:26   
2020 12:50:26   
2020 13:12:59  
Pending Labs Complete   
2020 12:58:47   
2020 12:58:47   
2020 12:58:57  
Lab Complete   
2020 12:58:47   
2020 12:58:47   
2020 12:58:57  
Reg Complete Request   
2020 13:02:59  
Reg Bed Request   
Complete 2020   
13:02:59 2020   
13:02:59 2020   
13:02:59  
CT Complete 2020   
13:16:31 2020   
13:25:58 2020   
13:55:27  
Pending Labs Cancel   
2020 13:19:32   
2020 14:09:40  
Lab Cancel 2020   
13:19:32 2020   
14:09:40  
Pending Labs Complete   
2020 14:04:59   
2020 14:04:59   
2020 14:20:03  
Pending Labs Complete   
2020 14:10:17   
2020 14:10:17   
2020 14:21:02  
Lab Complete   
2020 14:10:17   
2020 14:10:17   
2020 14:21:02  
Discharge Complete   
2020 14:40:32   
2020 15:17:05   
2020 15:17:05  
Transfer Complete   
2020 15:17:05   
ADDRESS:  
[facility] [place] [address]  
PHYS DOC NOTES:  
MEDICAL INFORMATION:  
Prescriptions Given:  
PATIENT EDUCATION   
INFORMATION:  
Instructions:  
Smoking Cessation;   
Chest Pain   
(Nonspecific);   
Pleurisy  
  
Follow up:  
With: Address: When:  
BRITTNEY MEZA [address] [place] [address] [phone]   
Business (1) Within 2   
to 3 days  
Comments:  
Return to ED if   
symptoms worsen.  
Call Dr. Meza for a   
follow up   
appointment.  
DIAGNOSIS:  
1:Back pain;   
2:Pleurisy          Normal                                  [facility]  
   
                                                    ED Note-Physicianon 20   
   
                                        ED Note-Physician   Basic Information  
Time Seen:  
Diamond GARBERChelsie   
2020 12:16  
  
Chief Complaint  
pt arrived via   
private vehicle, c/o   
chest pain that   
radiates up her neck   
for a week, +nausea   
denies vomiting,   
cough, fever.  
History of Present   
Illness  
Pt 48 yo female   
presents with back   
pain and sensation   
that her lungs are   
burning. Onset of   
symptoms about three   
weeks ago. She had   
pain and swelling in   
her right leg, pain   
behind her right   
knee. Seen by her PCP   
and had outpatient   
D-dimer that she   
reports was less than   
250 four days ago.   
Her leg pain and   
swelling then   
resolved. During that   
time she had some   
mild to moderate   
discomfort to the mid   
back region on the   
right side more than   
the left side. Two   
days ago she noted   
that her back was   
hurting more and her   
lungs felt like they   
were burning. She   
does not feel short   
of breath and has not   
been coughing. Two   
days ago she noted   
blue discoloration to   
the tips of the four   
fingers in her right   
hand only, she was   
able to feel a pulse   
in her hand and her   
fingers did not feel   
cold. She took a   
shower, had dinner   
and her fingers   
looked better.   
Yesterday she did not   
feel too bad. Today   
her back pain   
continued and she   
developed nausea. No   
vomiting or diarrhea.   
No abdominal pain. No   
current leg pain or   
swelling. No   
headache, neck pain,   
focal extremity   
weakness, numbness,   
tingling, fevers,   
sore throat,   
dizziness, syncope,   
rashes, problems   
urinating, changes in   
activity level or   
falls. Previous   
spontaneous   
pneumothorax x3 on   
the left side that   
require surgery.   
Reports negative   
cardiac echo within   
the past few years.   
Smoker. Recent   
stressors with death   
of a pet, starting a   
new job and youngest   
child left for   
college.  
Review of Systems  
All organ systems are   
reviewed. Pertinent   
positive and negative   
findings as mentioned   
in the HPI.  
Physical Exam  
Vitals & Measurements  
T: 37.2 ?C (Oral) HR:   
66(Monitored) RR: 16   
BP: 145/97 SpO2: 100%  
HT: 160 cm HT: 160.0   
cm WT: 53.3 kg WT:   
53.3 kg BMI: 20.82  
Appropriate   
healthcare PPE was   
used in evaluating   
this patient. The   
patient was placed in   
a mask. The   
healthcare provider   
was wearing N95 mask,   
gloves, eye   
protection and   
utilizing proper hand   
hygiene. All   
equipment was   
properly cleansed.  
General: alert, no   
acute distress, here   
alone  
Skin: warm, dry,   
intact, no rashes, no   
petechiae, no   
cyanosis, no bruises  
Head: atraumatic,   
normocephalic  
Neck: full ROM, no   
tenderness to   
palpation  
Eye: vision grossly   
intact, clear   
conjunctiva  
ENT: moist mucous   
membranes, nares   
clear, voice normal  
Cardiovascular:   
regular rate and   
rhythm, no murmur,   
symmetric distal   
pulses palpated, no   
edema  
Respiratory: Lungs   
CTA, no   
wheezes/rales/rhonchi  
, non-labored  
Chest wall: no   
tenderness with   
palpation  
Gastrointestinal:   
soft, non-tender,   
normal bowel sounds,   
no guarding/rebound   
tenderness/rigidity,   
thin build  
Back: full ROM, no   
tenderness with   
palpation  
Extremities: no   
deformity, full ROM,   
normal strength,   
ambulatory  
Neurological: alert   
and oriented, normal   
speech, normal   
sensory and motor   
exam  
Medical Decision   
Making  
1317: Re-examined,   
nausea improved.   
Discussed ED tests.  
1435: CT results   
discussed with Pt.   
She is feeling much   
relieved at this   
time. Pt with   
symptoms for the past   
few days, constant   
and constant all day   
today. Negative work   
up in the ED with   
normal troponin, EKG   
with no ischemic   
changes, CTA with no   
acute findings. Pt   
was offered hospital   
observation but she   
feels well enough to   
go home. She will   
call her PCP for a   
follow up   
appointment.  
Assessment/Plan  
1. Back pain (M54.9:   
Dorsalgia,   
unspecified)  
2. Pleurisy (R09.1:   
Pleurisy)  
Orders:  
aspirin, 162 mg = 2   
tab(s), Tab-Chew,   
Oral, Once, Stop date   
20 12:30:00   
EDT, STAT, Start date   
20 12:30:00 EDT  
ondansetron, 4 mg = 2   
mL, Injection, IV   
Push, Once, Stop date   
20 12:30:00   
EDT, STAT, Start date   
20 12:30:00 EDT  
Sodium Chloride 0.9%   
intravenous solution   
1,000 mL, 1,000 mL,   
IV, 75 mL/hr, STAT,   
Start date 20   
12:30:00 EDT, 13.3   
hour(s), Total volume   
(mL): 1,000, 53.3 kg,   
1.54, m2  
Automated Diff  
Basic Metabolic Panel  
C-Reactive Protein  
CBC w/ Auto Diff  
CTA Chest  
ECG 12 Lead Adult  
ED Cardiac Monitoring  
eGFR  
Hepatic Function   
Panel  
Lipase Level  
Oxygen Saturation  
PT & PTT  
Saline Lock Insert  
Sedimentation Rate   
Automated  
Troponin 0 Hr.  
Troponin 3 Hr.  
Medications   
Administered  
Given  
Sodium Chloride 0.9%   
IV Sol 1000 mL 1,000   
mL, 1000 mL, IV  
aspirin 81 mg Chew   
Tab, 162 mg, Oral  
Zofran 4 mg/2 mL   
Injection, 4 mg, IV   
Push  
Disposition Plan  
Patient Discharge   
Condition  
Stable  
Discharge Disposition  
Home  
Discharge   
Prescription List  
Prescriptions  
No active   
prescription   
medications  
Follow-up  
With When Contact   
Information  
BRITTNEY MEZA Within 2   
to 3 days [address] [place] [address]   
[phone]- [phone]   
Business (1)  
Additional   
Instructions: Return   
to ED if symptoms   
worsen. Call Dr. Meza for a follow up   
appointment.  
Patient Education  
Smoking Cessation  
Chest Pain   
(Nonspecific)  
Pleurisy  
Problem List/Past   
Medical History  
Ongoing  
Smoker  
Historical  
No qualifying data  
Medications  
Inpatient  
Sodium Chloride 0.9%   
IV Sol 1000 mL 1,000   
mL, 1000 mL, IV  
Home  
No active home   
medications  
Allergies  
No Known Medication   
Allergies  
Social History  
Alcohol - Denies   
Alcohol Use,   
2020  
Substance Abuse -   
Denies Substance   
Abuse, 2020  
Tobacco - High Risk,   
2020  
Lab Results  
WBC: 7.1 E9/L   
(20 12:41:00)  
RBC: 4.1 E12/L Low   
(20 12:41:00)  
Hgb: 12.4 gm/dL   
(20 12:41:00)  
Hct: 36.5 % (20   
12:41:00)  
MCV: 89.1 fL   
(20 12:41:00)  
MCH: 30.3 pg   
(20 12:41:00)  
MCHC: 34 gm/dL   
(20 12:41:00)  
RDW: 14 % (20   
12:41:00)  
Platelet: 207 E9/L   
(20 12:41:00)  
MPV: 8.3 fL (20   
12:41:00)  
Neutro Auto: 58.1 %   
(20 12:41:00)  
Lymph Auto: 29.5 %   
(20 12:41:00)  
Mono Auto: 10.5 %   
(20 12:41:00)  
Eos Auto: 1.1 %   
(20 12:41:00)  
Basophil Auto: 0.8 %   
(20 12:41:00)  
Neutro Absolute: 4.1   
E9/L (20   
12:41:00)  
Lymph Absolute: 2.1   
E9/L (20   
12:41:00)  
Mono Absolute: 0.7   
E9/L (20   
12:41:00)  
Eos Absolute: 0.1   
E9/L (20   
12:41:00)  
Basophil Absolute:   
0.1 E9/L (20   
12:41:00)  
Sed Rate Automated: 9   
mm/hr (20   
12:41:00)  
PT: 11.7 second(s)   
(20 12:41:00)  
INR: 1 (20:41:00)  
PTT: 26.6 second(s)   
(20 12:41:00)  
Glucose Lvl: 106   
mg/dL (20   
12:41:00)  
BUN: 14 mg/dL   
(20 12:41:00)  
Creatinine: 0.9 mg/dL   
(20 12:41:00)  
eGFR: >60 (20   
12:41:00)  
eGFR AA: >60   
(20 12:41:00)  
BUN/Creat Ratio: 16   
(20 12:41:00)  
Sodium Lvl: 135   
mmol/L (20   
12:41:00)  
Potassium Lvl: 4   
mmol/L (20   
12:41:00)  
Chloride: 102 mmol/L   
(20 12:41:00)  
CO2: 24 mmol/L   
(20 12:41:00)  
AGAP: 13 mEq/L   
(20 12:41:00)  
Calcium Lvl: 8.8   
mg/dL Low (20   
12:41:00)  
Alk Phos: 44   
Int._Unit/L (20   
12:41:00)  
ALT: 14 Int._Unit/L   
(20 12:41:00)  
AST: 18 Int._Unit/L   
(20 12:41:00)  
Total Protein: 6.6   
gm/dL (20   
12:41:00)  
Albumin Lvl: 4 gm/dL   
(20 12:41:00)  
Globulin: 2.6 gm/dL   
(20 12:41:00)  
A/G Ratio: 1.5   
(20 12:41:00)  
Bili Total: 0.6 mg/dL   
(20 12:41:00)  
Bili Direct: 0.1   
mg/dL (20   
12:41:00)  
Bili Indirect: 0.5   
mg/dL (20   
12:41:00)  
Lipase Lvl: 23 unit/L   
(20 12:41:00)  
CRP: <0.5 (20   
12:41:00)  
Troponin: 2.4 pg/mL   
Low (20   
12:41:00)  
Diagnostic Results  
CTA Chest  
  
20 13:55:27  
GFR (mL/min/1/73m2)   
60  
Contrast: Isovue 300  
Contrast amount in   
ml?s: 58  
  
Signed By: Renato Ashraf MD  
  
20 14:13:22  
IMPRESSION: NO   
EVIDENCE OF PULMONARY   
EMBOLI OR ACUTE   
FINDINGS IN THE   
CHEST.  
COPD, WHICH IS BEST   
EVALUATED CLINICALLY.  
EXAM: CTA Chest  
DATE: 2020  
CLINICAL HISTORY: PE   
suspected, high prob.  
COMPARISON: Chest   
pain. History of left   
pneumothorax with   
surgery.  
TECHNIQUE: Spiral   
enhanced images were   
obtained of the chest   
after the infusion of  
approximately 58 mL   
of Isovue 370   
contrast with   
pulmonary artery CTA   
protocol.  
Routine and volume   
rendered images were   
performed on a   
three-dimensional   
workstation.  
All CT scans at this   
facility use dose   
modulation, iterative   
reconstruction,   
and/or  
weight based dosing   
when appropriate to   
reduce radiation dose   
to as low as   
reasonably  
achievable.  
FINDINGS:  
There are no filling   
defects identified   
within the pulmonary   
arterial vasculature   
to  
suggest pulmonary   
emboli.  
Moderately advanced   
centrilobular   
emphysematous changes   
are present,   
predominantly of  
the mid and upper   
lung fields without   
significant labs or   
bowel.  
Postoperative changes   
within the medial   
left lung apex are   
otherwise   
unremarkable.  
Mild scarring and/or   
atelectasis is   
present in the lung   
apices and posterior   
bases.  
There are no other   
significant   
infiltrates,   
worrisome nodules,   
lymphadenopathy,  
pleural or   
pericardial   
effusions.  
There are no   
fractures identified.   
The limited imaging   
of the included upper   
abdomen  
is noncontributory.  
  
Signed By: Renato Ashraf MD  
EKG Results  
EC20: SINUS   
RHYTHM  
POSSIBLE LEFT ATRIAL   
ENLARGEMENT  
POSSIBLE RIGHT   
VENTRICULAR   
CONDUCTION DELAY  
RATE 90, NORMAL TX   
AND QRS, NORMAL AXIS,   
NO PREVIOUS  
  
BORDERLINE ECG  
Signed By: Chelsie Fields DO 2020   
12:31:43            Normal                                  [facility]  
   
                                        Comment on above:   Result Comment: Elec  
tronically Signed By: Chelsie Fields DO\.br\Date and Time Signed: 20 14:43 EDT   
   
                                                    ED Patient Education Noteon   
2020   
   
                                                    ED Patient Education   
Note                                    Family Medicine  
Smoking Cessation  
Quitting smoking is   
important to your   
health and has many   
advantages. However,   
it is not always easy   
to quit since   
nicotine is a very   
addictive drug.   
Oftentimes, people   
try 3 times or more   
before being able to   
quit. This document   
explains the best   
ways for you to   
prepare to quit   
smoking. Quitting   
takes hard work and a   
lot of effort, but   
you can do it.  
ADVANTAGES OF   
QUITTING SMOKING  
? You will live   
longer, feel better,   
and live better.  
? Your body will feel   
the impact of   
quitting smoking   
almost immediately.  
? Within 20 minutes,   
blood pressure   
decreases. Your pulse   
returns to its normal   
level.  
? After 8 hours,   
carbon monoxide   
levels in the blood   
return to normal.   
Your oxygen level   
increases.  
? After 24 hours, the   
chance of having a   
heart attack starts   
to decrease. Your   
breath, hair, and   
body stop smelling   
like smoke.  
? After 48 hours,   
damaged nerve endings   
begin to recover.   
Your sense of taste   
and smell improve.  
? After 72 hours, the   
body is virtually   
free of nicotine.   
Your bronchial tubes   
relax and breathing   
becomes easier.  
? After 2 to 12   
weeks, lungs can hold   
more air. Exercise   
becomes easier and   
circulation improves.  
? The risk of having   
a heart attack,   
stroke, cancer, or   
lung disease is   
greatly reduced.  
? After 1 year, the   
risk of coronary   
heart disease is cut   
in half.  
? After 5 years, the   
risk of stroke falls   
to the same as a   
nonsmoker.  
? After 10 years, the   
risk of lung cancer   
is cut in half and   
the risk of other   
cancers decreases   
significantly.  
? After 15 years, the   
risk of coronary   
heart disease drops,   
usually to the level   
of a nonsmoker.  
? If you are   
pregnant, quitting   
smoking will improve   
your chances of   
having a healthy   
baby.  
? The people you live   
with, especially any   
children, will be   
healthier.  
? You will have extra   
money to spend on   
things other than   
cigarettes.  
QUESTIONS TO THINK   
ABOUT BEFORE   
ATTEMPTING TO QUIT  
You may want to talk   
about your answers   
with your health care   
provider.  
? Why do you want to   
quit?  
? If you tried to   
quit in the past,   
what helped and what   
did not?  
? What will be the   
most difficult   
situations for you   
after you quit? How   
will you plan to   
handle them?  
? Who can help you   
through the tough   
times? Your family?   
Friends? A health   
care provider?  
? What pleasures do   
you get from smoking?   
What ways can you   
still get pleasure if   
you quit?  
Here are some   
questions to ask your   
health care provider:  
? How can you help me   
to be successful at   
quitting?  
? What medicine do   
you think would be   
best for me and how   
should I take it?  
? What should I do if   
I need more help?  
? What is smoking   
withdrawal like? How   
can I get information   
on withdrawal?  
GET READY  
? Set a quit date.  
? Change your   
environment by   
getting rid of all   
cigarettes, ashtrays,   
matches, and lighters   
in your home, car, or   
work. Do not let   
people smoke in your   
home.  
? Review your past   
attempts to quit.   
Think about what   
worked and what did   
not.  
GET SUPPORT AND   
ENCOURAGEMENT  
You have a better   
chance of being   
successful if you   
have help. You can   
get support in many   
ways.  
? Tell your family,   
friends, and   
coworkers that you   
are going to quit and   
need their support.   
Ask them not to smoke   
around you.  
? Get individual,   
group, or telephone   
counseling and   
support. Programs are   
available at local   
hospitals and health   
centers. Call your   
local health   
department for   
information about   
programs in your   
area.  
? Spiritual beliefs   
and practices may   
help some smokers   
quit.  
? Download a  quit   
meter  on your   
computer to keep   
track of quit   
statistics, such as   
how long you have   
gone without smoking,   
cigarettes not   
smoked, and money   
saved.  
? Get a self-help   
book about quitting   
smoking and staying   
off tobacco.  
LEARN NEW SKILLS AND   
BEHAVIORS  
? Distract yourself   
from urges to smoke.   
Talk to someone, go   
for a walk, or occupy   
your time with a   
task.  
? Change your normal   
routine. Take a   
different route to   
work. Drink tea   
instead of coffee.   
Eat breakfast in a   
different place.  
? Reduce your stress.   
Take a hot bath,   
exercise, or read a   
book.  
? Plan something   
enjoyable to do every   
day. Reward yourself   
for not smoking.  
? Explore interactive   
web-based programs   
that specialize in   
helping you quit.  
GET MEDICINE AND USE   
IT CORRECTLY  
Medicines can help   
you stop smoking and   
decrease the urge to   
smoke. Combining   
medicine with the   
above behavioral   
methods and support   
can greatly increase   
your chances of   
successfully quitting   
smoking.  
? Nicotine   
replacement therapy   
helps deliver   
nicotine to your body   
without the negative   
effects and risks of   
smoking. Nicotine   
replacement therapy   
includes nicotine   
gum, lozenges,   
inhalers, nasal   
sprays, and skin   
patches. Some may be   
available   
over-the-counter and   
others require a   
prescription.  
? Antidepressant   
medicine helps people   
abstain from smoking,   
but how this works is   
unknown. This   
medicine is available   
by prescription.  
? Nicotinic receptor   
partial agonist   
medicine simulates   
the effect of   
nicotine in your   
brain. This medicine   
is available by   
prescription.  
Ask your health care   
provider for advice   
about which medicines   
to use and how to use   
them based on your   
health history. Your   
health care provider   
will tell you what   
side effects to look   
out for if you choose   
to be on a medicine   
or therapy. Carefully   
read the information   
on the package. Do   
not use any other   
product containing   
nicotine while using   
a nicotine   
replacement product.  
RELAPSE OR DIFFICULT   
SITUATIONS  
Most relapses occur   
within the first 3   
months after   
quitting. Do not be   
discouraged if you   
start smoking again.   
Remember, most people   
try several times   
before finally   
quitting. You may   
have symptoms of   
withdrawal because   
your body is used to   
nicotine. You may   
crave cigarettes, be   
irritable, feel very   
hungry, cough often,   
get headaches, or   
have difficulty   
concentrating. The   
withdrawal symptoms   
are only temporary.   
They are strongest   
when you first quit,   
but they will go away   
within 10?14 days.  
To reduce the chances   
of relapse, try to:  
? Avoid drinking   
alcohol. Drinking   
lowers your chances   
of successfully   
quitting.  
? Reduce the amount   
of caffeine you   
consume. Once you   
quit smoking, the   
amount of caffeine in   
your body increases   
and can give you   
symptoms, such as a   
rapid heartbeat,   
sweating, and   
anxiety.  
? Avoid smokers   
because they can make   
you want to smoke.  
? Do not let weight   
gain distract you.   
Many smokers will   
gain weight when they   
quit, usually less   
than 10 pounds. Eat a   
healthy diet and stay   
active. You can   
always lose the   
weight gained after   
you quit.  
? Find ways to   
improve your mood   
other than smoking.  
FOR MORE INFORMATION  
www.smokefree.gov  
Document Released:   
2002 Document   
Revised: 2015   
Document Reviewed:   
2013  
ExitCare? Patient   
Information ?2015   
[employer]. This   
information is not   
intended to replace   
advice given to you   
by your health care   
provider. Make sure   
you discuss any   
questions you have   
with your health care   
provider. Chest Pain   
(Nonspecific)  
It is often hard to   
give a specific   
diagnosis for the   
cause of chest pain.   
There is always a   
chance that your pain   
could be related to   
something serious,   
such as a heart   
attack or a blood   
clot in the lungs.   
You need to follow up   
with your health care   
provider for further   
evaluation.  
(Inserted Image.   
Unable to display)  
CAUSES  
? Heartburn.  
? Pneumonia or   
bronchitis.  
? Anxiety or stress.  
? Inflammation around   
your heart   
(pericarditis) or   
lung (pleuritis or   
pleurisy).  
? A blood clot in the   
lung.  
? A collapsed lung   
(pneumothorax). It   
can develop suddenly   
on its own   
(spontaneous   
pneumothorax) or from   
trauma to the chest.  
? Shingles infection   
(herpes zoster   
virus).  
The chest wall is   
composed of bones,   
muscles, and   
cartilage. Any of   
these can be the   
source of the pain.  
? The bones can be   
bruised by injury.  
? The muscles or   
cartilage can be   
strained by coughing   
or overwork.  
? The cartilage can   
be affected by   
inflammation and   
become sore   
(costochondritis).  
DIAGNOSIS  
Lab tests or other   
studies may be needed   
to find the cause of   
your pain. Your   
health care provider   
may have you take a   
test called an   
ambulatory   
electrocardiogram   
(ECG). An ECG records   
your heartbeat   
patterns over a   
24-hour period. You   
may also have other   
tests, such as:  
? Transthoracic   
echocardiogram (TTE).   
During   
echocardiography,   
sound waves are used   
to evaluate how blood   
flows through your   
heart.  
? Transesophageal   
echocardiogram (DENNISE).  
? Cardiac monitoring.   
This allows your   
health care provider   
to monitor your heart   
rate and rhythm in   
real time.  
? Holter monitor.   
This is a portable   
device that records   
your heartbeat and   
can help diagnose   
heart arrhythmias. It   
allows your health   
care provider to   
track your heart   
activity for several   
days, if needed.  
? Stress tests by   
exercise or by giving   
medicine that makes   
the heart beat   
faster.  
TREATMENT  
? Treatment depends   
on what may be   
causing your chest   
pain. Treatment may   
include:  
? Acid blockers for   
heartburn.  
? Anti-inflammatory   
medicine.  
? Pain medicine for   
inflammatory   
conditions.  
? Antibiotics if an   
infection is present.  
? You may be advised   
to change lifestyle   
habits. This includes   
stopping smoking and   
avoiding alcohol,   
caffeine, and   
chocolate.  
? You may be advised   
to keep your head   
raised (elevated)   
when sleeping. This   
reduces the chance of   
acid going backward   
from your stomach   
into your esophagus.  
Most of the time,   
nonspecific chest   
pain will improve   
within 2?3 days with   
rest and mild pain   
medicine.  
HOME CARE   
INSTRUCTIONS  
? If antibiotics were   
prescribed, take them   
as directed. Finish   
them even if you   
start to feel better.  
? For the next few   
days, avoid physical   
activities that bring   
on chest pain.   
Continue physical   
activities as   
directed.  
? Do not use any   
tobacco products,   
including cigarettes,   
chewing tobacco, or   
electronic   
cigarettes.  
? Avoid drinking   
alcohol.  
? Only take medicine   
as directed by your   
health care provider.  
? Follow your health   
care provider's   
suggestions for   
further testing if   
your chest pain does   
not go away.  
? Keep any follow-up   
appointments you   
made. If you do not   
go to an appointment,   
you could develop   
lasting (chronic)   
problems with pain.   
If there is any   
problem keeping an   
appointment, call to   
reschedule.  
SEEK MEDICAL CARE IF:  
? Your chest pain   
does not go away,   
even after treatment.  
? You have a rash   
with blisters on your   
chest.  
? You have a fever.  
SEEK IMMEDIATE   
MEDICAL CARE IF:  
? You have increased   
chest pain or pain   
that spreads to your   
arm, neck, jaw, back,   
or abdomen.  
? You have shortness   
of breath.  
? You have an   
increasing cough, or   
you cough up blood.  
? You have severe   
back or abdominal   
pain.  
? You feel nauseous   
or vomit.  
? You have severe   
weakness.  
? You faint.  
? You have chills.  
This is an emergency.   
Do not wait to see if   
the pain will go   
away. Get medical   
help at once. Call   
your local emergency   
services (911 in   
[place] Do not drive   
yourself to the   
hospital.  
MAKE SURE YOU:  
? Understand these   
instructions.  
? Will watch your   
condition.  
? Will get help right   
away if you are not   
doing well or get   
worse.  
Document Released:   
2006 Document   
Revised: 2014   
Document Reviewed:   
2009  
ExitCare? Patient   
Information ?2015   
[employer]. This   
information is not   
intended to replace   
advice given to you   
by your health care   
provider. Make sure   
you discuss any   
questions you have   
with your health care   
provider. Pleurisy  
Pleurisy is an   
inflammation and   
swelling of the   
lining of the lungs   
(pleura). Because of   
this inflammation, it   
hurts to breathe. It   
can be aggravated by   
coughing, laughing,   
or deep breathing.   
Pleurisy is often   
caused by an   
underlying infection   
or disease.  
HOME CARE   
INSTRUCTIONS  
Monitor your pleurisy   
for any changes. The   
following actions may   
help to alleviate any   
discomfort you are   
experiencing:  
? Medicine may help   
with pain. Only take   
over-the-counter or   
prescription   
medicines for pain,   
discomfort, or fever   
as directed by your   
health care provider.  
? Only take   
antibiotic medicine   
as directed. Make   
sure to finish it   
even if you start to   
feel better.  
SEEK MEDICAL CARE IF:  
? Your pain is not   
controlled with   
medicine or is   
increasing.  
? You have an   
increase in pus-like   
(purulent) secretions   
brought up with   
coughing.  
SEEK IMMEDIATE   
MEDICAL CARE IF:  
? You have blue or   
dark lips,   
fingernails, or   
toenails.  
? You are coughing up   
blood.  
? You have increased   
difficulty breathing.  
? You have continuing   
pain unrelieved by   
medicine or pain   
lasting more than 1   
week.  
? You have pain that   
radiates into your   
neck, arms, or jaw.  
? You develop   
increased shortness   
of breath or   
wheezing.  
? You develop a   
fever, rash,   
vomiting, fainting,   
or other serious   
symptoms.  
MAKE SURE YOU:  
? Understand these   
instructions. ?  
? Will watch your   
condition. ?  
? Will get help right   
away if you are not   
doing well or get   
worse.  
?  
Document Released:   
2006 Document   
Revised: 2014   
Document Reviewed:   
2014  
ExitCare? Patient   
Information ?2015   
[employer]. This   
information is not   
intended to replace   
advice given to you   
by your health care   
provider. Make sure   
you discuss any   
questions you have   
with your health care   
provider.           Normal                                  [facility]  
   
                                                    ED Patient Summaryon   
020   
   
                                        ED Patient Summary  (Inserted Image.   
Unable to display)   
[address] [address] [phone]  
  
Patient Discharge   
Instructions  
  
Person Information  
Name: KATHI SELF Age:   
49 Years  
MRN: [id number] FIN:   
[id number]  
Arrival Date:   
2020 12:14:46  
Discharge Diagnosis:   
1:Back pain;   
2:Pleurisy  
Primary Care   
Physician: BRITTNEY MEZA MD  
  
Provider Information  
Primary Provider:  
Chelsie Fields DO  
Advanced Practice   
Clinician:None  
The exam and   
treatment you   
received in the   
Emergency Department   
were for an urgent   
problem and are not   
intended as complete   
care. It is important   
that you follow up   
with a doctor, nurse   
practitioner, or   
physician?s assistant   
for ongoing care. If   
your symptoms become   
worse or you do not   
improve as expected   
and you are unable to   
reach your usual   
health care provider,   
you should return to   
the Emergency   
Department. We are   
available 24 hours a   
day.  
  
CLAIRKATHI has been   
given the following   
list of patient   
education materials,   
prescriptions and   
follow-up   
instructions:  
  
Follow-up   
Instructions:  
With: Address: When:  
BRITTNEY MEZA [address] [place], [place] [id number] [phone]   
Business (1) Within 2   
to 3 days  
Comments:  
Return to ED if   
symptoms worsen.  
Call Dr. Meza for a   
follow up   
appointment.  
  
In the event that   
this physician does   
not participate in   
your insurance   
network, please   
consult with your   
insurance company to   
find a nearby   
participating   
provider.  
  
Patient Education   
Materials:  
Smoking Cessation;   
Chest Pain   
(Nonspecific);   
Pleurisy  
  
A MESSAGE TO ALL   
PATIENTS REGARDING   
OPIOIDS  
  
PRESCRIPTION OPIOIDS:   
WHAT YOU NEED TO KNOW  
  
Prescription opioids   
can be used to help   
relieve   
moderate-to-severe   
pain and are often   
prescribed following   
a surgery or injury,   
or for certain health   
conditions. These   
medications can be an   
important part of the   
treatment but also   
come with serious   
risks. It is   
important to work   
with your healthcare   
provider to make sure   
you are getting the   
safest, most   
effective care.  
  
WHAT ARE THE RISKS   
AND SIDE EFFECTS OF   
OPIOID USE?  
Prescription opioids   
carry serious risks   
of addiction and   
overdose, especially   
with prolonged use.   
An opioid overdose,   
often marked by   
slowed breathing, can   
cause sudden death.   
The use of   
prescription opioids   
can have a number of   
side effects as well,   
even when taken as   
directed:  
? Tolerance?meaning   
you might need to   
take more of the   
medication for the   
same pain relief  
? Physical   
dependence?meaning   
you have symptoms of   
withdrawal when a   
medication is stopped  
? Increased   
sensitivity to pain  
? Constipation  
? Nausea, vomiting,   
and dry mouth  
? Sleepiness and   
dizziness  
? Confusion  
? Depression  
? Low levels of   
testosterone that can   
result in lower sex   
drive, energy, and   
strength  
? Itching and   
sweating  
  
RISKS ARE GREATER   
WITH:  
? History of drug   
misuse, substance use   
disorder, or overdose  
? Mental health   
conditions (such as   
depression or   
anxiety)  
? Sleep apnea  
? Older age (65 years   
and older)  
? Pregnancy  
  
Avoid alcohol while   
taking prescription   
opioids. Also, unless   
specifically advised   
by your health care   
provider, medications   
to avoid include:  
? Benzodiazepines   
(such as Xanax or   
Valium)  
? Muscle relaxants   
(such as Soma or   
Flexeril)  
? Hypnotics (such as   
Ambien or Lunesta)  
? Other prescription   
opioids  
  
KNOW YOUR OPTIONS  
Talk to your health   
care provider about   
ways to manage your   
pain that don?t   
involve prescription   
opioids. Some of   
these options may   
actually work better   
and have fewer risks   
and side effects.   
Options may include:  
? Pain relievers such   
as acetaminophen,   
ibuprofen, and   
naproxen  
? Some medication   
that are also used   
for depression or   
seizures  
? Physical therapy   
and exercise  
? Cognitive   
behavioral therapy, a   
psychological,   
goal-directed   
approach, in which   
patients learn how to   
modify physical,   
behavioral, and   
emotional triggers of   
pain and stress.  
  
IF YOU ARE PRESCRIBED   
OPIOIDS FOR PAIN:  
? Never take opioids   
in greater amounts or   
more often than   
prescribed.  
? Follow up with your   
primary health care   
provider.  
o Work together to   
create a plan on how   
to manage your pain.  
o Talk about ways to   
help manage your pain   
that don?t involve   
prescription opioids.  
o Talk about any and   
all concerns and side   
effects.  
? Help prevent misuse   
and abuse  
o Never sell or share   
prescription opioids.  
o Never use another   
person?s prescription   
opioids.  
? Store prescription   
opioids in a secure   
place and out of   
reach of others (this   
may include visitors,   
children, friends,   
and family).  
? Safely dispose of   
unused prescription   
opioids: Find your   
community drug   
take-back program or   
your pharmacy   
mail-back program, or   
flush them down the   
toilet, following   
guidance from the   
Food and Drug   
Administration   
(www.fda.gov/Drugs/Re  
sourcesForYou).  
? Visit   
www.cdc.gov/drugoverd  
ose to learn about   
the risks of opioids   
abuse and overdose.  
? If you believe you   
may be struggling   
with addiction, tell   
your health care   
professional and ask   
for guidance or call   
[facility]?S National   
Helpline at   
[phone].  
  
  
r Source: [place]   
Department of Health   
and Human   
Services/Center for   
Disease Control &   
Prevention [employer]   
[employer] [employer]  
  
Medications Given:  
Medication Dose Route  
Sodium Chloride 0.9%   
intravenous solution   
1000.00 mL Initial   
Volume  
75.00 mL/hr IV Right   
Antecubit Alyssa  
aspirin 162.00 mg   
Oral  
ondansetron 4.00 mg   
IV Push Right   
Antecubit [place]  
  
Medication   
Information:  
Comment:  
Pharmacy Information:  
*********************  
*********************  
********************  
Thank you for   
choosing [facility]  
*********************  
*********************  
********************  
Patient Education   
Materials:  
Smoking Cessation  
Quitting smoking is   
important to your   
health and has many   
advantages. However,   
it is not always easy   
to quit since   
nicotine is a very   
addictive drug.   
Oftentimes, people   
try 3 times or more   
before being able to   
quit. This document   
explains the best   
ways for you to   
prepare to quit   
smoking. Quitting   
takes hard work and a   
lot of effort, but   
you can do it.  
ADVANTAGES OF   
QUITTING SMOKING  
? You will live   
longer, feel better,   
and live better.  
? Your body will feel   
the impact of   
quitting smoking   
almost immediately.  
? Within 20 minutes,   
blood pressure   
decreases. Your pulse   
returns to its normal   
level.  
? After 8 hours,   
carbon monoxide   
levels in the blood   
return to normal.   
Your oxygen level   
increases.  
? After 24 hours, the   
chance of having a   
heart attack starts   
to decrease. Your   
breath, hair, and   
body stop smelling   
like smoke.  
? After 48 hours,   
damaged nerve endings   
begin to recover.   
Your sense of taste   
and smell improve.  
? After 72 hours, the   
body is virtually   
free of nicotine.   
Your bronchial tubes   
relax and breathing   
becomes easier.  
? After 2 to 12   
weeks, lungs can hold   
more air. Exercise   
becomes easier and   
circulation improves.  
? The risk of having   
a heart attack,   
stroke, cancer, or   
lung disease is   
greatly reduced.  
? After 1 year, the   
risk of coronary   
heart disease is cut   
in half.  
? After 5 years, the   
risk of stroke falls   
to the same as a   
nonsmoker.  
? After 10 years, the   
risk of lung cancer   
is cut in half and   
the risk of other   
cancers decreases   
significantly.  
? After 15 years, the   
risk of coronary   
heart disease drops,   
usually to the level   
of a nonsmoker.  
? If you are   
pregnant, quitting   
smoking will improve   
your chances of   
having a healthy   
baby.  
? The people you live   
with, especially any   
children, will be   
healthier.  
? You will have extra   
money to spend on   
things other than   
cigarettes.  
QUESTIONS TO THINK   
ABOUT BEFORE   
ATTEMPTING TO QUIT  
You may want to talk   
about your answers   
with your health care   
provider.  
? Why do you want to   
quit?  
? If you tried to   
quit in the past,   
what helped and what   
did not?  
? What will be the   
most difficult   
situations for you   
after you quit? How   
will you plan to   
handle them?  
? Who can help you   
through the tough   
times? Your family?   
Friends? A health   
care provider?  
? What pleasures do   
you get from smoking?   
What ways can you   
still get pleasure if   
you quit?  
Here are some   
questions to ask your   
health care provider:  
? How can you help me   
to be successful at   
quitting?  
? What medicine do   
you think would be   
best for me and how   
should I take it?  
? What should I do if   
I need more help?  
? What is smoking   
withdrawal like? How   
can I get information   
on withdrawal?  
GET READY  
? Set a quit date.  
? Change your   
environment by   
getting rid of all   
cigarettes, ashtrays,   
matches, and lighters   
in your home, car, or   
work. Do not let   
people smoke in your   
home.  
? Review your past   
attempts to quit.   
Think about what   
worked and what did   
not.  
GET SUPPORT AND   
ENCOURAGEMENT  
You have a better   
chance of being   
successful if you   
have help. You can   
get support in many   
ways.  
? Tell your family,   
friends, and   
coworkers that you   
are going to quit and   
need their support.   
Ask them not to smoke   
around you.  
? Get individual,   
group, or telephone   
counseling and   
support. Programs are   
available at local   
hospitals and health   
centers. Call your   
local health   
department for   
information about   
programs in your   
area.  
? Spiritual beliefs   
and practices may   
help some smokers   
quit.  
? Download a  quit   
meter  on your   
computer to keep   
track of quit   
statistics, such as   
how long you have   
gone without smoking,   
cigarettes not   
smoked, and money   
saved.  
? Get a self-help   
book about quitting   
smoking and staying   
off tobacco.  
LEARN NEW SKILLS AND   
BEHAVIORS  
? Distract yourself   
from urges to smoke.   
Talk to someone, go   
for a walk, or occupy   
your time with a   
task.  
? Change your normal   
routine. Take a   
different route to   
work. Drink tea   
instead of coffee.   
Eat breakfast in a   
different place.  
? Reduce your stress.   
Take a hot bath,   
exercise, or read a   
book.  
? Plan something   
enjoyable to do every   
day. Reward yourself   
for not smoking.  
? Explore interactive   
web-based programs   
that specialize in   
helping you quit.  
GET MEDICINE AND USE   
IT CORRECTLY  
Medicines can help   
you stop smoking and   
decrease the urge to   
smoke. Combining   
medicine with the   
above behavioral   
methods and support   
can greatly increase   
your chances of   
successfully quitting   
smoking.  
? Nicotine   
replacement therapy   
helps deliver   
nicotine to your body   
without the negative   
effects and risks of   
smoking. Nicotine   
replacement therapy   
includes nicotine   
gum, lozenges,   
inhalers, nasal   
sprays, and skin   
patches. Some may be   
available   
over-the-counter and   
others require a   
prescription.  
? Antidepressant   
medicine helps people   
abstain from smoking,   
but how this works is   
unknown. This   
medicine is available   
by prescription.  
? Nicotinic receptor   
partial agonist   
medicine simulates   
the effect of   
nicotine in your   
brain. This medicine   
is available by   
prescription.  
Ask your health care   
provider for advice   
about which medicines   
to use and how to use   
them based on your   
health history. Your   
health care provider   
will tell you what   
side effects to look   
out for if you choose   
to be on a medicine   
or therapy. Carefully   
read the information   
on the package. Do   
not use any other   
product containing   
nicotine while using   
a nicotine   
replacement product.  
RELAPSE OR DIFFICULT   
SITUATIONS  
Most relapses occur   
within the first 3   
months after   
quitting. Do not be   
discouraged if you   
start smoking again.   
Remember, most people   
try several times   
before finally   
quitting. You may   
have symptoms of   
withdrawal because   
your body is used to   
nicotine. You may   
crave cigarettes, be   
irritable, feel very   
hungry, cough often,   
get headaches, or   
have difficulty   
concentrating. The   
withdrawal symptoms   
are only temporary.   
They are strongest   
when you first quit,   
but they will go away   
within 10?14 days.  
To reduce the chances   
of relapse, try to:  
? Avoid drinking   
alcohol. Drinking   
lowers your chances   
of successfully   
quitting.  
? Reduce the amount   
of caffeine you   
consume. Once you   
quit smoking, the   
amount of caffeine in   
your body increases   
and can give you   
symptoms, such as a   
rapid heartbeat,   
sweating, and   
anxiety.  
? Avoid smokers   
because they can make   
you want to smoke.  
? Do not let weight   
gain distract you.   
Many smokers will   
gain weight when they   
quit, usually less   
than 10 pounds. Eat a   
healthy diet and stay   
active. You can   
always lose the   
weight gained after   
you quit.  
? Find ways to   
improve your mood   
other than smoking.  
FOR MORE INFORMATION  
www.smokefree.gov  
Document Released:   
2002 Document   
Revised: 2015   
Document Reviewed:   
2013  
ExitCare? Patient   
Information ?2015   
[employer]. This   
information is not   
intended to replace   
advice given to you   
by your health care   
provider. Make sure   
you discuss any   
questions you have   
with your health care   
provider.  
Chest Pain   
(Nonspecific)  
It is often hard to   
give a specific   
diagnosis for the   
cause of chest pain.   
There is always a   
chance that your pain   
could be related to   
something serious,   
such as a heart   
attack or a blood   
clot in the lungs.   
You need to follow up   
with your health care   
provider for further   
evaluation.  
(Inserted Image.   
Unable to display)  
CAUSES  
? Heartburn.  
? Pneumonia or   
bronchitis.  
? Anxiety or stress.  
? Inflammation around   
your heart   
(pericarditis) or   
lung (pleuritis or   
pleurisy).  
? A blood clot in the   
lung.  
? A collapsed lung   
(pneumothorax). It   
can develop suddenly   
on its own   
(spontaneous   
pneumothorax) or from   
trauma to the chest.  
? Shingles infection   
(herpes zoster   
virus).  
The chest wall is   
composed of bones,   
muscles, and   
cartilage. Any of   
these can be the   
source of the pain.  
? The bones can be   
bruised by injury.  
? The muscles or   
cartilage can be   
strained by coughing   
or overwork.  
? The cartilage can   
be affected by   
inflammation and   
become sore   
(costochondritis).  
DIAGNOSIS  
Lab tests or other   
studies may be needed   
to find the cause of   
your pain. Your   
health care provider   
may have you take a   
test called an   
ambulatory   
electrocardiogram   
(ECG). An ECG records   
your heartbeat   
patterns over a   
24-hour period. You   
may also have other   
tests, such as:  
? Transthoracic   
echocardiogram (TTE).   
During   
echocardiography,   
sound waves are used   
to evaluate how blood   
flows through your   
heart.  
? Transesophageal   
echocardiogram (DENNISE).  
? Cardiac monitoring.   
This allows your   
health care provider   
to monitor your heart   
rate and rhythm in   
real time.  
? Holter monitor.   
This is a portable   
device that records   
your heartbeat and   
can help diagnose   
heart arrhythmias. It   
allows your health   
care provider to   
track your heart   
activity for several   
days, if needed.  
? Stress tests by   
exercise or by giving   
medicine that makes   
the heart beat   
faster.  
TREATMENT  
? Treatment depends   
on what may be   
causing your chest   
pain. Treatment may   
include:  
? Acid blockers for   
heartburn.  
? Anti-inflammatory   
medicine.  
? Pain medicine for   
inflammatory   
conditions.  
? Antibiotics if an   
infection is present.  
? You may be advised   
to change lifestyle   
habits. This includes   
stopping smoking and   
avoiding alcohol,   
caffeine, and   
chocolate.  
? You may be advised   
to keep your head   
raised (elevated)   
when sleeping. This   
reduces the chance of   
acid going backward   
from your stomach   
into your esophagus.  
Most of the time,   
nonspecific chest   
pain will improve   
within 2?3 days with   
rest and mild pain   
medicine.  
HOME CARE   
INSTRUCTIONS  
? If antibiotics were   
prescribed, take them   
as directed. Finish   
them even if you   
start to feel better.  
? For the next few   
days, avoid physical   
activities that bring   
on chest pain.   
Continue physical   
activities as   
directed.  
? Do not use any   
tobacco products,   
including cigarettes,   
chewing tobacco, or   
electronic   
cigarettes.  
? Avoid drinking   
alcohol.  
? Only take medicine   
as directed by your   
health care provider.  
? Follow your health   
care provider's   
suggestions for   
further testing if   
your chest pain does   
not go away.  
? Keep any follow-up   
appointments you   
made. If you do not   
go to an appointment,   
you could develop   
lasting (chronic)   
problems with pain.   
If there is any   
problem keeping an   
appointment, call to   
reschedule.  
SEEK MEDICAL CARE IF:  
? Your chest pain   
does not go away,   
even after treatment.  
? You have a rash   
with blisters on your   
chest.  
? You have a fever.  
SEEK IMMEDIATE   
MEDICAL CARE IF:  
? You have increased   
chest pain or pain   
that spreads to your   
arm, neck, jaw, back,   
or abdomen.  
? You have shortness   
of breath.  
? You have an   
increasing cough, or   
you cough up blood.  
? You have severe   
back or abdominal   
pain.  
? You feel nauseous   
or vomit.  
? You have severe   
weakness.  
? You faint.  
? You have chills.  
This is an emergency.   
Do not wait to see if   
the pain will go   
away. Get medical   
help at once. Call   
your local emergency   
services (911 in   
[place] Do not drive   
yourself to the   
hospital.  
MAKE SURE YOU:  
? Understand these   
instructions.  
? Will watch your   
condition.  
? Will get help right   
away if you are not   
doing well or get   
worse.  
Document Released:   
2006 Document   
Revised: 2014   
Document Reviewed:   
2009  
ExitCare? Patient   
Information ?2015   
[employer]. This   
information is not   
intended to replace   
advice given to you   
by your health care   
provider. Make sure   
you discuss any   
questions you have   
with your health care   
provider.  
Pleurisy  
Pleurisy is an   
inflammation and   
swelling of the   
lining of the lungs   
(pleura). Because of   
this inflammation, it   
hurts to breathe. It   
can be aggravated by   
coughing, laughing,   
or deep breathing.   
Pleurisy is often   
caused by an   
underlying infection   
or disease.  
HOME CARE   
INSTRUCTIONS  
Monitor your pleurisy   
for any changes. The   
following actions may   
help to alleviate any   
discomfort you are   
experiencing:  
? Medicine may help   
with pain. Only take   
over-the-counter or   
prescription   
medicines for pain,   
discomfort, or fever   
as directed by your   
health care provider.  
? Only take   
antibiotic medicine   
as directed. Make   
sure to finish it   
even if you start to   
feel better.  
SEEK MEDICAL CARE IF:  
? Your pain is not   
controlled with   
medicine or is   
increasing.  
? You have an   
increase in pus-like   
(purulent) secretions   
brought up with   
coughing.  
SEEK IMMEDIATE   
MEDICAL CARE IF:  
? You have blue or   
dark lips,   
fingernails, or   
toenails.  
? You are coughing up   
blood.  
? You have increased   
difficulty breathing.  
? You have continuing   
pain unrelieved by   
medicine or pain   
lasting more than 1   
week.  
? You have pain that   
radiates into your   
neck, arms, or jaw.  
? You develop   
increased shortness   
of breath or   
wheezing.  
? You develop a   
fever, rash,   
vomiting, fainting,   
or other serious   
symptoms.  
MAKE SURE YOU:  
? Understand these   
instructions. ?  
? Will watch your   
condition. ?  
? Will get help right   
away if you are not   
doing well or get   
worse.  
?  
Document Released:   
2006 Document   
Revised: 2014   
Document Reviewed:   
2014  
ExitCare? Patient   
Information ?   
[employer]. This   
information is not   
intended to replace   
advice given to you   
by your health care   
provider. Make sure   
you discuss any   
questions you have   
with your health care   
provider.  
CLAIR TINSLEY AMY , have   
received the   
following patient   
education   
materials/instruction  
s and have verbalized   
understanding:  
Patient Education   
Materials: Smoking   
Cessation; Chest Pain   
(Nonspecific);   
Pleurisy  
  
Follow-up   
Instructions:  
With: Address: When:  
BRITTNEY MEZA [address] [place] [address]  
[phone]   
Business ([employer] Within 2   
to 3 days  
Comments:  
Return to ED if   
symptoms worsen.  
Call Dr. Meza for a   
follow up   
appointment.  
  
  
Patient   
Signature____________  
_____________________  
____________   
Date___________  
  
Clinician/Nurse   
Signature____________  
_____________________  
_____ Date___________  
2020 15:17:07  Normal                                  [facility]  
   
                                                    Hep Func Panelon 2020   
   
                      Albumin [Mass/Vol] 4.0 g/dL   Normal     3.3-5.0    [facility]  
   
                                        Comment on above:   Performed By: #### 2  
283227, 3172804, 1026626, 8273002,   
8578089, 42066425, 88317038, 78946229, 74029363, 7423714 ####  
[facility]  
[address] [place] [address]   
   
                      Albumin [Mass/Vol] 1.5 g/dL   Normal     1.1-2.2    [facility]  
   
                                        Comment on above:   Performed By: #### 2  
759861, 2456260, 2432226, 4620203,   
0184822, 36482960, 60883116, 32276591, 08224899, 0037771 ####  
[facility]  
[address] [place] [address]   
   
                                                    ALP [Catalytic   
activity/Vol]   44 Int._Unit/L  Normal          21-98           [facility]  
   
                                        Comment on above:   Performed By: #### 2  
466523, 3575806, 1960341, 6593448,   
3170143, 57322914, 50275974, 76254097, 04713282, 0976037 ####  
[facility]  
[address] [id number]   
   
                                                    ALT No additional P-5'-P   
[Catalytic activity/Vol] 14 Int._Unit/L  Normal          6-46            [facility]  
   
                                        Comment on above:   Performed By: #### 2  
376812, 5544589, 7467253, 8975704,   
0295738, 68471800, 82265236, 85546567, 43929725, 6544743 ####  
[facility]  
[address] [place], [place] [address]   
   
                                                    AST [Catalytic   
activity/Vol]   18 Int._Unit/L  Normal          5-43            [facility]  
   
                                        Comment on above:   Performed By: #### 2  
004782, 2996364, 5971058, 7459197,   
7802604, 61047402, 59756252, 86789688, 85527466, 4036448 ####  
[facility]  
[address] [place] [address]   
   
                      Bilirubin [Mass/Vol] 0.6 mg/dL  Normal     0.0-1.1    [facility]  
   
                                        Comment on above:   Performed By: #### 2  
847578, 6094854, 0150137, 1926378,   
6522762, 80290400, 97933118, 18204926, 11334143, 3226127 ####  
[facility]  
[address] [place] [address]   
   
                                                    Bilirubin.direct   
[Mass/Vol]      0.1 mg/dL       Normal          0.1-0.4         [facility]  
   
                                        Comment on above:   Performed By: #### 2  
837436, 4448698, 5033990, 8605066,   
4554295, 67140840, 48003927, 01181123, 27159567, 5105456 ####  
[facility]  
[address] [place] [address]   
   
                                                    Bilirubin.direct   
[Mass/Vol]      0.5 mg/dL       Normal          0.1-0.9         [facility]  
   
                                        Comment on above:   Performed By: #### 2  
082664, 5128368, 8574864, 0577047,   
2904156, 03845244, 80507780, 77555666, 66336989, 0467376 ####  
[facility]  
[address] [place] [address]   
   
                      Globulin (S) [Mass/Vol] 2.6 g/dL   Normal     1.4-4.0    F  
[facility]  
   
                                        Comment on above:   Performed By: #### 2  
150597, 7478898, 2749678, 7083456,   
0426625, 30801474, 05930155, 64772335, 38565451, 9972897 ####  
[facility]  
[address] [place] [address]   
   
                      Protein [Mass/Vol] 6.6 g/dL   Normal     6.0-7.8    [facility]  
   
                                        Comment on above:   Performed By: #### 2  
211449, 4821770, 2380101, 0312301,   
9149329, 84416566, 98314965, 55907003, 49228741, 4338501 ####  
[facility]  
[address] [place] [address]   
   
                                                    Lipase Levelon 2020   
   
                                                    Lipase [Catalytic   
activity/Vol]   23 unit/L       Normal          13-58           [facility]  
   
                                        Comment on above:   Performed By: #### 2  
808363, 1915780, 1628289, 8231908,   
5463440, 70350387, 00536165, 06178542, 39176467, 2378653 ####  
[facility]  
[address] [place] [address]   
   
                                                    PT & PTTon 2020   
   
                      aPTT Coag (PPP) [Time] 26.6 second(s) Normal     25.1-36.5  
  [facility]  
   
                                        Comment on above:   Result Comment: Hepa  
rin therapeutic range (represented by   
Anti-Factor Xa activity of 0.2 - 0.4  
U/mL) corresponds to PTT of 56.6 - 109.0 sec.   
   
                                                            Performed By: #### 2  
879258, 7186079, 2248652, 6195331,   
1048277, 17174205, 13732476, 43372383, 40411853, 2174602 ####  
[facility]  
[address] [place] [address]   
   
                                                    INR Coag (PPP) [Relative   
time]           1.0 {INR}                                       [facility]  
   
                                        Comment on above:   Result Comment: INR   
results are specifically intended to   
assess patients stabilized on long-term  
Anticoagulation therapy suggested INR?s  
?Less Intensive Anticoagulation? 2.0 ? 3.0  
Conventional Range 3.0 ? 4.5   
   
                                                            Performed By: #### 2  
594118, 0377764, 4993579, 6479521,   
3461928, 13553926, 95788199, 73420762, 99478431, 6943751 ####  
[facility]  
[address] [place] [address]   
   
                      PT Coag (PPP) [Time] 11.7 second(s) Normal     10.2-12.9    
[facility]  
   
                                        Comment on above:   Performed By: #### 2  
506937, 0518230, 9370117, 8207489,   
7068455, 35874640, 78004154, 13743640, 34992401, 8230764 ####  
[facility]  
[address] [place] [address]   
   
                                                    Sed Rate Automatedon   
020   
   
                      ESR (Bld) [Velocity] 9 mm/h     Normal     0-34       [facility]  
   
                                        Comment on above:   Performed By: #### 2  
702062, 6842274, 2219510, 8104448,   
1346936, 20611677, 66901301, 17368325, 72723567, 7097882 ####  
[facility]  
[address] [place] [address]   
   
                                                    Troponin 0 Hr.on 2020   
   
                                                    Troponin I.cardiac   
[Mass/Vol]      2.40 pg/mL      Low             10.10-27.10     [facility]  
   
                                        Comment on above:   Result Comment: The   
95% CI (Confidence Interval) PPV   
(Positive Predictive Value) for myocardial infarction in   
females is 38 pg/mL, in males 51 pg/mL. The results should be   
used in conjunction with clinical conditions of myocardial   
infarction.  
(Access High Sensitivity Troponin I Instructions For Use,   
[employer], 2018)   
   
                                                            Performed By: #### 2  
431119, 6827061, 6181317, 4457755,   
0112297, 76689670, 40100836, 59567439, [id number] ####  
[facility]  
[address] [place] [address]   
   
                                                    eGFRon 2020   
   
                                                    GFR/1.73 sq M predicted   
among blacks MDRD   
(S/P/Bld) [Vol   
rate/Area]      mL/min/{1.73_m2} Normal          >=59            [facility]  
   
                                        Comment on above:   Order Comment: Order  
 added by Discern Expert.   
   
                                                            Result Comment: eGFR  
 is race adjusted. AA=.   
   
                                                            Performed By: #### 2  
[id number],   
[id number], [id number] ####  
[facility]  
[address] [place] [address]   
   
                                                    GFR/1.73 sq M predicted   
among non-blacks MDRD   
(S/P/Bld) [Vol   
rate/Area]      mL/min/{1.73_m2} Normal          >=59            [facility]  
   
                                        Comment on above:   Order Comment: Order  
 added by Discern Expert.   
   
                                                            Result Comment:   
sven kidney disease could be indicated at   
eGFR's of less than 60 mL/min/1.73m2. Kidney failure is   
indicated at less than 15 mL/min/1.73m2.   
   
                                                            Performed By: #### 2  
[id number],   
[id number] ####  
[facility]  
[address] [place] [address]   
  
  
  
Vital Signs  
  
  
                          Date Time    Vital Sign   Value        Performing   
Clinician                               Facility  
   
                                                    2025   
14:130400          Body height         161.3 cm            Lorenzo Kirby MD  
Work Phone:   
[phone]                          [facility]  
   
                                                    2025   
14:                              Body mass index   
(BMI) [Ratio]             21.05 kg/m2               Lorenzo Kirby MD  
Work Phone:   
[phone]                          [facility]  
   
                                                    2025   
14:          Body weight         54.75 kg            Lorenzo Kirby MD  
Work Phone:   
[phone]                          [facility]  
   
                                                    2025   
14:                              Diastolic blood   
pressure                  84 mm[Hg]                 Lorenzo Kirby MD  
Work Phone:   
[phone]                          [facility]  
   
                                                    2025   
14:          Heart rate          84 /min             Lorenzo Kirby MD  
Work Phone:   
[phone]                          [facility]  
   
                                                    2025   
14:                              Systolic blood   
pressure                  134 mm[Hg]                Lorenzo Kirby MD  
Work Phone:   
[phone]                          [facility]  
   
                                                    2025   
15:          Body height         160 cm              Roque Hartman MD  
Work Phone:   
[phone]                          [facility]  
   
                                                    2025   
15:                              Body mass index   
(BMI) [Ratio]             21.26 kg/m2               Roque Hartman MD  
Work Phone:   
[phone]                          [facility]  
   
                                                    2025   
15:          Body weight         54.43 kg            Roque Hartman MD  
Work Phone:   
[phone]                          [facility]  
   
                                                    2025   
15:                              Diastolic blood   
pressure                  74 mm[Hg]                 Roque Hartman MD  
Work Phone:   
[phone]                          [facility]  
   
                                                    2025   
15:          Heart rate          76 /min             Roque Hartman MD  
Work Phone:   
[phone]                          [facility]  
   
                                                    2025   
15:                              Systolic blood   
pressure                  124 mm[Hg]                Roque Hartman MD  
Work Phone:   
[phone]                          [facility]  
   
                                                    2025   
13:      Body height     160.02 cm                       [facility]  
   
                                                    2025   
13:                              Body mass index   
(BMI) [Ratio]       21.2 kg/m2                              [facility]  
   
                                                    2025   
13:      Body weight     54.43 kg                        [facility]  
   
                                                    2025   
13:                              Diastolic blood   
pressure            67 mm[Hg]                               [facility]  
   
                                                    2025   
13:      Heart rate      86 /min                         [facility]  
   
                                                    2025   
13:                              Systolic blood   
pressure            96 mm[Hg]                               [facility]  
   
                                                    2025   
08:      Body height     160.02 cm                       [facility]  
   
                                                    2025   
08:                              Body mass index   
(BMI) [Ratio]       21.8 kg/m2                              [facility]  
   
                                                    2025   
08:      Body weight     55.84 kg                        [facility]  
   
                                                    2025   
08:                              Diastolic blood   
pressure            88 mm[Hg]                               [facility]  
   
                                                    2025   
08:      Heart rate      73 /min                         [facility]  
   
                                                    2025   
08:                              Systolic blood   
pressure            143 mm[Hg]                              [facility]  
   
                                                    2025   
14:                              Body mass index   
(BMI) [Ratio]             21.62 kg/m2               Bree Visci DO  
Work Phone:   
[phone]                          [facility]  
   
                                                    2025   
14:          Body weight         55.79 kg            Bree Visci DO  
Work Phone:   
[phone]                          [facility]  
   
                                                    2025   
14:                              Diastolic blood   
pressure                  70 mm[Hg]                 Bree Visci DO  
Work Phone:   
[phone]                          [facility]  
   
                                                    2025   
14:                              Systolic blood   
pressure                  120 mm[Hg]                Bree Visci DO  
Work Phone:   
[phone]                          [facility]  
   
                                                    2024   
11:      Body height     160.02 cm                       [facility]  
   
                                                    2024   
11:                              Body mass index   
(BMI) [Ratio]       21.7 kg/m2                              [facility]  
   
                                                    2024   
11:120500      Body weight     55.79 kg                        [facility]  
   
                                                    2024   
11:                              Diastolic blood   
pressure            84 mm[Hg]                               [facility]  
   
                                                    2024   
11:      Heart rate      76 /min                         [facility]  
   
                                                    2024   
11:                              Systolic blood   
pressure            120 mm[Hg]                              [facility]  
   
                                                    10-   
10:                              Body mass index   
(BMI) [Ratio]             21.97 kg/m2               Kathi KELLER  
Work Phone:   
[phone]                          [facility]  
   
                                                    10-   
10:          Body weight         56.7 kg             Kathi Carrasco PA  
Work Phone:   
[phone]                          [facility]  
   
                                                    10-   
10:                              Diastolic blood   
pressure                  80 mm[Hg]                 Kathi Romeey PA  
Work Phone:   
[phone]                          [facility]  
   
                                                    10-   
10:                              Systolic blood   
pressure                  140 mm[Hg]                Kathi Carrasco PA  
Work Phone:   
[phone]                          [facility]  
   
                                                    10-   
09:          Body height         160.02 cm           MD Brittney Meza  
Work Phone:   
[phone]                          [facility]  
   
                                                    10-   
09:                              Body mass index   
(BMI) [Ratio]             22 kg/m2                  MD Brittney Meza  
Work Phone:   
[phone]                          [facility]  
   
                                                    10-   
09:          Body weight         56.41 kg            MD Brittney Meza  
Work Phone:   
[phone]                          [facility]  
   
                                                    10-   
09:                              Diastolic blood   
pressure                  80 mm[Hg]                 MD Brittney Meza  
Work Phone:   
[phone]                          [facility]  
   
                                                    10-   
09:          Heart rate          96 /min             MD Brittney Meza  
Work Phone:   
[phone]                          [facility]  
   
                                                    10-   
09:          Respiratory rate    18 /min             MD Brittney Meza  
Work Phone:   
[phone]                          [facility]  
   
                                                    10-   
09:                              SaO2% (BldA) [Mass   
fraction]                 99 %                      MD Brittney Meza  
Work Phone:   
[phone]                          [facility]  
   
                                                    10-   
09:                              Systolic blood   
pressure                  122 mm[Hg]                MD Brittney Meza  
Work Phone:   
[phone]                          [facility]  
   
                                                    10-   
15:                              Diastolic blood   
pressure                  82 mm[Hg]                 Roque Hartman MD  
Work Phone:   
[phone]                          [facility]  
   
                                                    10-   
15:                              Systolic blood   
pressure                  131 mm[Hg]                Roque Hartman MD  
Work Phone:   
[phone]                          [facility]  
   
                                                    10-   
15:          Body height         160 cm              Roque Hartman MD  
Work Phone:   
[phone]                          [facility]  
   
                                                    10-   
15:                              Body mass index   
(BMI) [Ratio]             22.6 kg/m2                Roque Hartman MD  
Work Phone:   
[phone]                          [facility]  
   
                                                    10-   
15:          Body weight         57.88 kg            Roque Hartman MD  
Work Phone:   
[phone]                          [facility]  
   
                                                    10-   
15:          Heart rate          84 /min             Roque Hartman MD  
Work Phone:   
[phone]                          [facility]  
   
                                                    2024   
14:          Body height         160.02 cm           MD Brittney Meza  
Work Phone:   
[phone]                          [facility]  
   
                                                    2024   
14:                              Body mass index   
(BMI) [Ratio]             22.4 kg/m2                MD Brittney Meza  
Work Phone:   
[phone]                          [facility]  
   
                                                    2024   
14:          Body weight         57.6 kg             MD Brittney Meza  
Work Phone:   
[phone]                          [facility]  
   
                                                    2024   
14:                              Diastolic blood   
pressure                  78 mm[Hg]                 MD Brittney Meza  
Work Phone:   
[phone]                          [facility]  
   
                                                    2024   
14:          Heart rate          82 /min             MD Brittney Meza  
Work Phone:   
[phone]                          [facility]  
   
                                                    2024   
14:                              Systolic blood   
pressure                  135 mm[Hg]                MD Brittney Meza  
Work Phone:   
[phone]                          [facility]  
   
                                                    2024   
13:                              Body mass index   
(BMI) [Ratio]             22.39 kg/m2               Kathi KELLER  
Work Phone:   
[phone]                          [facility]  
   
                                                    2024   
13:          Body weight         57.79 kg            Kathi KELLER  
Work Phone:   
[phone]                          [facility]  
   
                                                    2024   
10:                              Body mass index   
(BMI) [Ratio]             22.5 kg/m2                Bree Visci DO  
Work Phone:   
[phone]                          [facility]  
   
                                                    2024   
10:          Body weight         58.06 kg            Bree Visci DO  
Work Phone:   
[phone]                          [facility]  
   
                                                    2024   
10:                              Diastolic blood   
pressure                  86 mm[Hg]                 Bree Visci DO  
Work Phone:   
[phone]                          [facility]  
   
                                                    2024   
10:                              Systolic blood   
pressure                  152 mm[Hg]                Bree Visci DO  
Work Phone:   
[phone]                          [facility]  
   
                                                    2024   
11:          Body height         160.02 cm           MD Brittney Meza  
Work Phone:   
[phone]                          [facility]  
   
                                                    2024   
11:                              Body mass index   
(BMI) [Ratio]             22.4 kg/m2                MD Brittney Meza  
Work Phone:   
[phone]                          [facility]  
   
                                                    2024   
11:          Body weight         57.6 kg             MD Brittney Meza  
Work Phone:   
[phone]                          [facility]  
   
                                                    2024   
11:                              Diastolic blood   
pressure                  79 mm[Hg]                 MD Brittney Meza  
Work Phone:   
[phone]                          [facility]  
   
                                                    2024   
11:          Heart rate          88 /min             MD Brittney Meza  
Work Phone:   
[phone]                          [facility]  
   
                                                    2024   
11:                              Systolic blood   
pressure                  121 mm[Hg]                MD Brittney Meza  
Work Phone:   
[phone]                          [facility]  
   
                                                    2024   
11:          Body height         160.02 cm           MD Brittney Meza  
Work Phone:   
[phone]                          [facility]  
   
                                                    2024   
11:                              Body mass index   
(BMI) [Ratio]             23 kg/m2                  MD Brittney Meza  
Work Phone:   
[phone]                          [facility]  
   
                                                    2024   
11:          Body weight         58.96 kg            MD Brittney Meza  
Work Phone:   
[phone]                          [facility]  
   
                                                    2024   
11:                              Diastolic blood   
pressure                  84 mm[Hg]                 MD Brittney Meza  
Work Phone:   
[phone]                          [facility]  
   
                                                    2024   
11:          Heart rate          86 /min             MD Brittney Meza  
Work Phone:   
[phone]                          [facility]  
   
                                                    2024   
11:                              Systolic blood   
pressure                  125 mm[Hg]                MD Brittney Meza  
Work Phone:   
[phone]                          [facility]  
   
                                                    2024   
07:          Body height         160.02 cm           MD Brittney Meza  
Work Phone:   
[phone]                          [facility]  
   
                                                    2024   
07:          Body temperature    97.2 [degF]         MD Brittney Meza  
Work Phone:   
[phone]                          [facility]  
   
                                                    2024   
07:          Body weight         57.6 kg             MD Brittney Meza  
Work Phone:   
[phone]                          [facility]  
   
                                                    2024   
07:                              Diastolic blood   
pressure                  90 mm[Hg]                 MD Brittney Meza  
Work Phone:   
[phone]                          [facility]  
   
                                                    2024   
07:          Heart rate          96 /min             MD Brittney Meza  
Work Phone:   
[phone]                          [facility]  
   
                                                    2024   
07:          Respiratory rate    20 /min             MD Brittney Meza  
Work Phone:   
[phone]                          [facility]  
   
                                                    2024   
07:                              SaO2% (BldA) [Mass   
fraction]                 100 %                     MD Brittney Meza  
Work Phone:   
[phone]                          [facility]  
   
                                                    2024   
07:                              Systolic blood   
pressure                  144 mm[Hg]                MD Brittney Meza  
Work Phone:   
[phone]                          [facility]  
   
                                                    2024   
11:      Body height     162.56 cm                       [facility]  
   
                                                    2024   
11:                              Body mass index   
(BMI) [Ratio]       22.3 kg/m2                              [facility]  
   
                                                    2024   
11:      Body weight     58.96 kg                        [facility]  
   
                                                    2024   
11:                              Diastolic blood   
pressure            86 mm[Hg]                               [facility]  
   
                                                    2024   
11:      Heart rate      85 /min                         [facility]  
   
                                                    2024   
11:                              Systolic blood   
pressure            136 mm[Hg]                              [facility]  
   
                                                    2024   
11:      Body height     162.56 cm                       [facility]  
   
                                                    2024   
11:                              Body mass index   
(BMI) [Ratio]       23 kg/m2                                [facility] [facility]  
   
                                                    2024   
11:      Body weight     60.78 kg                        [facility]  
   
                                                    2024   
11:                              Diastolic blood   
pressure            81 mm[Hg]                               [facility]  
   
                                                    2024   
11:      Heart rate      92 /min                         [facility]  
   
                                                    2024   
11:                              Systolic blood   
pressure            112 mm[Hg]                              [facility]  
   
                                                    2024   
09:          Body height         162.56 cm           Brittney Meza  
Other Phone:   
[phone]                           [facility]   
[employer]   
[employer]  
Other Phone:   
[phone]  
   
                                                    2024   
09:                              Body mass index   
(BMI) [Ratio]             23.34 kg/m2               Brittney Meza  
Other Phone:   
[phone]                           [facility]   
[employer]   
[employer]  
Other Phone:   
[phone]  
   
                                                    2024   
09:          Body weight         61.69 kg            Brittney Meza  
Other Phone:   
[phone]                           [facility]   
[employer]   
[employer]  
Other Phone:   
[phone]  
   
                                                    2024   
09:                              Diastolic blood   
pressure                  83 mm[Hg]                 Brittney Meza  
Other Phone:   
[phone]                           [facility]   
[employer]   
[employer]  
Other Phone:   
[phone]  
   
                                                    2024   
09:                              Systolic blood   
pressure                  123 mm[Hg]                Brittney Meza  
Other Phone:   
[phone]                           [facility]   
[employer]   
[employer]  
Other Phone:   
[phone]  
   
                                                    2023   
10:          Body height         162.56 cm           Brittney Meza  
Other Phone:   
[phone]                           [facility]   
[employer]   
[employer]  
Other Phone:   
[phone]  
   
                                                    2023   
10:                              Body mass index   
(BMI) [Ratio]             23.17 kg/m2               Brittney Meza  
Other Phone:   
[phone]                           [facility]   
[employer]   
[employer]  
Other Phone:   
[phone]  
   
                                                    2023   
10:          Body weight         61.24 kg            Brittney Meza  
Other Phone:   
[phone]                           [facility]   
[employer]   
[employer]  
Other Phone:   
[phone]  
   
                                                    2023   
10:                              Diastolic blood   
pressure                  83 mm[Hg]                 Brittney Meza  
Other Phone:   
[phone]                           [facility]   
[employer]   
[employer]  
Other Phone:   
[phone]  
   
                                                    2023   
10:                              Systolic blood   
pressure                  145 mm[Hg]                Brittney Meza  
Other Phone:   
[phone]                           [facility]   
[employer]   
[employer]  
Other Phone:   
[phone]  
   
                                                    2023   
11:          Body height         162.56 cm           Brittney Meza  
Other Phone:   
[phone]                           [facility]   
[employer]   
[employer]  
Other Phone:   
[phone]  
   
                                                    2023   
11:                              Body mass index   
(BMI) [Ratio]             22.31 kg/m2               Brittney Meza  
Other Phone:   
[phone]                           [facility]   
[employer]   
[employer]  
Other Phone:   
[phone]  
   
                                                    2023   
11:          Body weight         58.97 kg            Brittney Meza  
Other Phone:   
[phone]                           [facility]   
[employer]   
[employer]  
Other Phone:   
[phone]  
   
                                                    2023   
11:                              Diastolic blood   
pressure                  81 mm[Hg]                 Brittney Meza  
Other Phone:   
[phone]                           [facility]   
[employer]   
[employer]  
Other Phone:   
[phone]  
   
                                                    2023   
11:                              Systolic blood   
pressure                  125 mm[Hg]                Brittney Meza  
Other Phone:   
[phone]                           [facility]   
[employer]   
[employer]  
Other Phone:   
[phone]  
   
                                                    2023   
14:          Body height         162.56 cm           Fiona Bland  
Other Phone:   
[phone]                           [facility]   
[employer]   
[employer]  
Other Phone:   
[phone]  
   
                                                    2023   
14:                              Body mass index   
(BMI) [Ratio]             22.31 kg/m2               Fiona Bland  
Other Phone:   
[phone]                           [facility]   
[employer]   
[employer]  
Other Phone:   
[phone]  
   
                                                    2023   
14:          Body weight         58.97 kg            Fiona Bland  
Other Phone:   
[phone]                           [facility]   
[employer]   
[employer]  
Other Phone:   
[phone]  
   
                                                    2023   
14:                              Diastolic blood   
pressure                  82 mm[Hg]                 Fiona Bland  
Other Phone:   
[phone]                           [facility]   
[employer]   
[employer]  
Other Phone:   
[phone]  
   
                                                    2023   
14:                              Systolic blood   
pressure                  120 mm[Hg]                Fiona Bland  
Other Phone:   
[phone]                           [facility]   
[employer]   
[employer]  
Other Phone:   
[phone]  
   
                                                    2023   
15:          Body height         162.56 cm           Brittney Meza  
Work Phone:   
[phone]                          [facility]   
[facility] [address]   
DO  
Work Phone:   
[phone]  
   
                                                    2023   
15:                              Body mass index   
(BMI) [Ratio]             22.66 kg/m2               Brittney Meza  
Work Phone:   
[phone]                          [facility]   
[facility] [address]   
DO  
Work Phone:   
[phone]  
   
                                                    2023   
15:                              Body surface area   
Derived from   
formula                   1.64 m2                   Brittney Meza  
Work Phone:   
[phone]                          [facility]   
[facility] [address]   
DO  
Work Phone:   
[phone]  
   
                                                    2023   
15:          Body weight         59.88 kg            Brittney Meza  
Work Phone:   
[phone]                          [facility]   
[facility]   
DO  
Work Phone:   
[phone]  
   
                                                    2023   
15:                              Diastolic blood   
pressure                  66 mm[Hg]                 Brittney Meza  
Work Phone:   
[phone]                          [facility]   
[facility] [address]   
DO  
Work Phone:   
[phone]  
   
                                                    2023   
15:          Heart rate          88 /min             Brittney Meza  
Work Phone:   
[phone]                          [facility]   
[facility] [address]   
DO  
Work Phone:   
[phone]  
   
                                                    2023   
15:                              Systolic blood   
pressure                  100 mm[Hg]                Brittney Meza  
Work Phone:   
[phone]                          [facility]   
[facility] [address]   
DO  
Work Phone:   
[phone]  
   
                                                    2022   
00:                              136 1               Brittney Meza  
Work Phone:   
[phone]                          [facility]   
[facility] [address] DO  
Work Phone:   
[phone]  
   
                                                    Comment on   
above:                                  [facility]   
   
                                                    11-   
13:          Body height         162.56 cm           Brittney Meza  
Work Phone:   
[phone]                          [facility]   
[facility] [address] DO  
Work Phone:   
[phone]  
   
                                                    11-   
13:                              Body mass index   
(BMI) [Ratio]             23.86 kg/m2               Brittney Meza  
Work Phone:   
[phone]                          [facility]   
[facility] [address] DO  
Work Phone:   
[phone]  
   
                                                    11-   
13:                              Body surface area   
Derived from   
formula                   1.68 m2                   Brittney Meza  
Work Phone:   
[phone]                          [facility] [address] DO  
Work Phone:   
[phone]  
   
                                                    11-   
13:          Body weight         63.05 kg            Brittney Meza  
Work Phone:   
[phone]                          [facility]   
[facility] [address] DO  
Work Phone:   
[phone]  
   
                                                    11-   
13:                              Diastolic blood   
pressure                  74 mm[Hg]                 Brittney Meza  
Work Phone:   
[phone]                          [facility] [address] DO  
Work Phone:   
[phone]  
   
                                                    11-   
13:          Heart rate          81 /min             Brittney Meza  
Work Phone:   
[phone]                          [facility]   
[facility] [address] DO  
Work Phone:   
[phone]  
   
                                                    11-   
13:                              Systolic blood   
pressure                  126 mm[Hg]                Brittney Meza  
Work Phone:   
[phone]                          [facility]   
[facility] [address] DO  
Work Phone:   
[phone]  
   
                                                    11-   
13:          Body height         162.56 cm           Brittney Meza  
Work Phone:   
[phone]                          [facility] [address] DO  
Work Phone:   
[phone]  
   
                                                    11-   
13:                              Body mass index   
(BMI) [Ratio]             23.86 kg/m2               Brittney Meza  
Work Phone:   
[phone]                          [facility]   
[facility] [address] DO  
Work Phone:   
[phone]  
   
                                                    11-   
13:                              Body surface area   
Derived from   
formula                   1.68 m2                   Brittney Meza  
Work Phone:   
[phone]                          [facility]   
[employer] [address] DO  
Work Phone:   
[phone]  
   
                                                    11-   
13:          Body weight         63.05 kg            Brittney BARRON Kwabena  
Work Phone:   
[phone]                          [facility]   
[facility] [address] [facility]  
Work Phone:   
[phone]  
   
                                                    11-   
13:                              Diastolic blood   
pressure                  84 mm[Hg]                 Brittney BARRON Kwabena  
Work Phone:   
[phone]                          [facility]   
[employer] [address] DO  
Work Phone:   
[phone]  
   
                                                    11-   
13:          Heart rate          81 /min             Brittney E Kwabena  
Work Phone:   
[phone]                          [facility]   
[employer] [address] DO  
Work Phone:   
[phone]  
   
                                                    11-   
13:                              Systolic blood   
pressure                  126 mm[Hg]                Brittney E Kwabena  
Work Phone:   
[phone]                          [facility]   
[place] [address] DO  
Work Phone:   
[phone]  
  
  
  
Encounters  
  
  
                          Encounter Date Encounter Type Care Provider Facility  
   
                                                    Start: 2025  
End: 2025                         Office outpatient new 45   
minutes                                 Lorenzo Kirby MD  
Work Phone:   
[phone]                          [facility]  
   
                                        Comment on above:   Hyperlipidemia, unsp  
ecified hyperlipidemia type;  
Shortness of breath on exertion;  
Chest pain, unspecified type;  
Family history of coronary artery disease;  
Palpitations;  
BMI 21.0-21.9, adult;  
Dizziness;  
Former smoker;  
New-onset angina ([facility])   
   
                                                    Start: 2025  
End: 2025           [facility]                [facility]   
[facility]  
   
                                                    Start: 2025  
End: 2025                         Office outpatient visit   
25 minutes                              Roque Hartman MD  
Work Phone:   
[phone]                          [facility]  
   
                                        Comment on above:   Chest pain, unspecif  
ied type (Primary Dx);  
Shortness of breath on exertion;  
Premature ventricular contractions;  
Hyperlipidemia, unspecified hyperlipidemia type;  
Former smoker;  
BMI 21.0-21.9, adult   
   
                                                    Start: 2025  
End: 2025     [facility]          [facility]  
   
Ambulatory  
   
                                                    Start: 2025  
End: 2025     ambulatory                              [facility]  
Work Phone:   
[phone]  
   
                                                    Start: 2025  
End: 2025                         Patient encounter   
procedure                                           [facility]  
Work Phone:   
[phone]  
   
                                                    Start: 03-  
End: 03-           ambulatory                Jyoti Chris MD                           Facility:[facility]  
   
                                                    Start: 2025  
End: 2025     ambulatory                              [facility]  
Work Phone:   
[phone]  
   
                                                    Start: 2025  
End: 2025                         Patient encounter   
procedure                                           [facility]  
Work Phone:   
[phone]  
   
                                                    Start: 2025  
End: 2025           Patient encounter status  Bree A Visci DO  
Work Phone:   
[phone]                          [facility]  
Work Phone:   
[phone]  
   
                                                    Start: 2025  
End: 2025                         Periodic preventive med   
est patient 40-64yrs                    Bree A Visci DO  
Work Phone:   
[phone]                          [facility]  
   
                                        Comment on above:   Encounter for gyneco  
logical examination with abnormal finding   
(Primary Dx);  
Encounter for screening mammogram for malignant neoplasm of breast;  
Screening for malignant neoplasm of cervix;  
Screening for HPV (human papillomavirus);  
Postmenopausal HRT (hormone replacement therapy);  
Vaginal irritation;  
Dyspareunia in female;  
Vaginal yeast infection   
   
                                                    Start: 2025  
End: 2025     ambulatory          BREE A VISCI     Not Available  
   
                                                    Start: 2024  
End: 2024                         Patient encounter   
procedure                                           [facility]   
[facility]  
Work Phone:   
[phone]  
   
                          Start: 2024 Non-patient / Non-visit               
 [facility]  
Work Phone:   
[phone]  
   
                                                    Start: 2024  
End: 2024                         Patient encounter   
procedure                               Brittney Meza MD  
Work Phone:   
[phone]                          [facility]   
[address] [place]  
Work Phone:   
[phone]  
   
                                                    Start: 2024  
End: 2024           ambulatory                Brittney Meza MD  
Work Phone:   
[phone]                          [facility]  
Work Phone:   
[phone]  
   
                                                    Start: 2024  
End: 2024                         Patient encounter   
procedure                               Brittney Meza MD  
Work Phone:   
[phone]                          [facility]-CT Scan   
[address] [place]  
Work Phone:   
[phone]  
   
                                                    Start: 2024  
End: 2024           ambulatory                Brittney Meza MD  
Work Phone:   
[phone]                          [facility]  
Work Phone:   
[phone]  
   
                                                    Start: 10-  
End: 10-                         Patient encounter   
procedure                               MD Brittney Meza  
Work Phone:   
[phone]                          [facility]-Lab   
[facility]  
   
                                                    Start: 10-  
End: 10-           ambulatory                MD Brittney Meza  
Work Phone:   
[phone]                          [facility]  
Work Phone:   
[phone]  
   
                                                    Start: 10-  
End: 10-           Bamboo flowsheet          Kathi KELLER  
Work Phone:   
[phone]                          [facility]  
   
                                                    Start: 10-  
End: 10-           Bamboo flowsheet          Kathi Carrasco PA  
Work Phone:   
[phone]                          [facility]  
   
                                                    Start: 10-  
End: 10-                         Office outpatient visit   
15 minutes                              Kathi KELLER  
Work Phone:   
[phone]                          [facility]  
   
                                        Comment on above:   Urine troubles;  
Diarrhea, unspecified type   
   
                                                    Start: 10-  
End: 10-     ambulatory          KATHI CARRASCO           Not Available  
   
                                                    Start: 10-  
End: 10-                         Patient encounter   
procedure                               MD Brittney Meza  
Work Phone:   
[phone]                          [facility]   
[facility]  
   
                                                    Start: 10-  
End: 10-           ambulatory                MD Brittney Meza  
Work Phone:   
[phone]                          [facility]  
Work Phone:   
[phone]  
   
                                                    Start: 10-  
End: 10-           ambulatory                MD Brittney Meza  
Work Phone:   
[phone]                          [facility]  
Work Phone:   
[phone]  
   
                                                    Start: 10-  
End: 10-                         Patient encounter   
procedure                               MD Brittney Meza  
Work Phone:   
[phone]                          [facility]  
Work Phone:   
[phone]  
   
                                                    Start: 10-  
End: 10-           Departed Referred         MD Brittney Meza  
Work Phone:   
[phone]                          [facility] Main   
[place]  
Work Phone:   
[phone]  
   
                                                    Start: 10-  
End: 10-           ambulatory                MD Brittney Meza  
Work Phone:   
[phone]                          [facility]  
Work Phone:   
[phone]  
   
                                                    Start: 10-  
End: 10-                         Patient encounter   
procedure                               MD Brittney Meza  
Work Phone:   
[phone]                          [facility]  
Work Phone:   
[phone]  
   
                                                    Start: 10-  
End: 10-     ambulatory          [facility]  
   
Ambulatory  
   
                                                    Start: 10-  
End: 10-                         Office outpatient visit   
15 minutes                              Roque Hartman MD  
Work Phone:   
[phone]                          [facility]  
   
                                        Comment on above:   Chest pain, unspecif  
ied type (Primary Dx);  
Premature ventricular contractions;  
Shortness of breath on exertion;  
Hyperlipidemia, unspecified hyperlipidemia type;  
Former smoker;  
Body mass index (BMI) 22.0-22.9, adult   
   
                                                    Start: 2024  
End: 2024           ambulatory                MD Brittney Meza  
Work Phone:   
[phone]                          [facility]  
Work Phone:   
[phone]  
   
                                                    Start: 2024  
End: 2024                         Patient encounter   
procedure                               MD Brittney Meza  
Work Phone:   
[phone]                          [facility]  
Work Phone:   
[phone]  
   
                                                    Start: 2024  
End: 09-           Bamboo flowsheet          Kathi KELLER  
Work Phone:   
[phone]                          [facility]  
   
                                                    Start: 2024  
End: 2024           External Result Encounter Ozzie Que DO  
Work Phone:   
[phone]                          [facility]   
Department Unsolicited  
   
                                                    Start: 2024  
End: 2024           External Result Encounter Ozzie Que DO  
Work Phone:   
[phone]                          [facility] External   
Department Unsolicited  
   
                                                    Start: 2024  
End: 2024                         Office outpatient visit   
15 minutes                              Kathi KELLER  
Work Phone:   
[phone]                          [facility]  
   
                                        Comment on above:   Vaginal pain   
   
                                                    Start: 2024  
End: 2024     ambulatory          KATHI CARRASCO           Not Available  
   
                                                    Start: 2024  
End: 2024           Bamboo flowsheet          Bree A Visci DO  
Work Phone:   
[phone]                          [facility]  
   
                                                    Start: 2024  
End: 2024           Bamboo flowsheet          Bree A Visci DO  
Work Phone:   
[phone]                          [facility]  
   
                                                    Start: 2024  
End: 2024                         Office outpatient visit   
15 minutes                              Bree A Visci DO  
Work Phone:   
[phone]                          [facility]  
   
                                        Comment on above:   Vulvar itching;  
Vaginal discharge;  
BV (bacterial vaginosis);  
Vaginal yeast infection   
   
                                                    Start: 2024  
End: 2024     ambulatory          BREE A VISCI     Not Available  
   
                                                    Start: 2024  
End: 2024           ambulatory                MD Brittney Meza  
Work Phone:   
[phone]                          [facility]  
Work Phone:   
[phone]  
   
                                                    Start: 2024  
End: 2024                         Patient encounter   
procedure                               MD Brittney Meza  
Work Phone:   
[phone]                          [facility]   
[facility]  
Work Phone:   
[phone]  
   
                                                    Start: 2024  
End: 2024                         Patient encounter   
procedure                               MD Brittney Meza  
Work Phone:   
[phone]                          [facility]   
[facility]   
Ctr  
   
                                                    Start: 2024  
End: 2024           ambulatory                MD Brittney Meza  
Work Phone:   
[phone]                          [facility]  
Work Phone:   
[phone]  
   
                                                    Start: 2024  
End: 2024           ambulatory                MD Brittney Meza  
Work Phone:   
[phone]                          [facility]  
Work Phone:   
[phone]  
   
                                                    Start: 2024  
End: 2024                         Patient encounter   
procedure                               MD Brittney Meza  
Work Phone:   
[phone]                          [facility] Physician   
[facility]  
Work Phone:   
[phone]  
   
                                                    Start: 2024  
End: 2024                         Emergency department   
patient visit                           MD Brittney Meza  
Work Phone:   
[phone]                          [facility]   
Room  
Work Phone:   
[phone]  
   
                                                    Start: 2024  
End: 2024     ambulatory                              [facility]  
Work Phone:   
[phone]  
   
                                                    Start: 2024  
End: 2024                         Patient encounter   
procedure                                           [facility] Phys[facility]   
[facility]  
Work Phone:   
[phone]  
   
                                                    Start: 2024  
End: 2024     ambulatory                              [facility]  
Work Phone:   
[phone]  
   
                                                    Start: 2024  
End: 2024                         Patient encounter   
procedure                                           [facility]   
[facility]  
Work Phone:   
[phone]  
   
                          Start: 2024 Non-patient / Non-visit               
 [facility] Physician   
[facility]   
Professional Co  
Work Phone:   
[phone]  
   
                                                    Start: 2024  
End: 2024           ambulatory                Brittney Meza  
Other Phone:   
[phone]                           [facility]   
Professional   
[employer]  
Other Phone:   
[phone]  
   
                          Start: 2024 Telephone encounter Brittney Meza [facility]  
   
                                                    Start: 2024  
End: 2024           ambulatory                Brittney Meza  
Other Phone:   
[phone]                           [facility]   
Professional   
[employer]poration  
Other Phone:   
[phone]  
   
                                        Start: 2024   Office outpatient vi  
sit   
15 minutes                Brittney Meza              [facility]  
   
                                                    Start: 12-  
End: 12-           ambulatory                Drew Mejia  
Other Phone:   
[phone]                           [facility]   
Professional   
[employer]poration  
Other Phone:   
[phone]  
   
                                        Start: 12-   Nursing evaluation o  
f   
patient and report        Drew Mejia             [facility]  
   
                          Start: 12- Telephone encounter Drew Mejia FP  
[facility]  
   
                                                    Start: 2023  
End: 2023           ambulatory                Brittney Meza  
Other Phone:   
[phone]                           [facility]   
Professional   
Corporation  
Other Phone:   
[phone]  
   
                          Start: 2023 Telephone encounter Brittney Meza [facility]  
   
                                                    Start: 2023  
End: 2023           ambulatory                Brittney Meza  
Other Phone:   
[phone]                           [facility]   
Professional   
[employer]poration  
Other Phone:   
[phone]  
   
                          Start: 2023 Telephone encounter Brittney Meza [facility]  
   
                                                    Start: 2023  
End: 2023           ambulatory                Brittney Meza  
Other Phone:   
[phone]                           [facility]   
Professional   
[employer]poration  
Other Phone:   
[phone]  
   
                                        Start: 2023   Office outpatient vi  
sit   
15 minutes                Brittney Meza              [facility]  
   
                                                    Start: 2023  
End: 2023           ambulatory                Brittney Meza  
Other Phone:   
[phone]                           [facility]   
Professional   
[employer]poration  
Other Phone:   
[phone]  
   
                          Start: 2023 Telephone encounter Brittney Meza [facility]  
   
                                                    Start: 2023  
End: 2023           ambulatory                Brittney Meza  
Other Phone:   
[phone]                           [facility]   
Professional   
Corporation  
Other Phone:   
[phone]  
   
                          Start: 2023 Telephone encounter Brittney Meza [facility]  
   
                                                    Start: 2023  
End: 2023           ambulatory                Brittney Meza  
Other Phone:   
[phone]                           [facility]   
Professional   
[employer]poration  
Other Phone:   
[phone]  
   
                                        Start: 2023   Encounter for genera  
l   
adult medical examination   
without abnormal findings Brittney Meza              [facility]  
   
                                        Start: 2023   Office outpatient vi  
sit   
15 minutes                Brittney Meza              [facility]  
   
                                                    Start: 2023  
End: 2023           ambulatory                Fiona Bland  
Other Phone:   
[phone]                           [facility]   
Professional   
Corporation  
Other Phone:   
[phone]  
   
                                        Start: 2023   Office outpatient vi  
sit   
15 minutes                [place] Bland       [facility]  
   
                                Start: 2023 Chart Update    Brittney Meza  
Work Phone:   
[phone]                          [facility] [address] DO  
Work Phone:   
[phone]  
   
                                Start: 2023 ambulatory      Dr. Brittney Meza                                   Facility:[id number]  
   
                                        Start: 2023   Office outpatient vi  
sit   
25 minutes                              Brittney Meza  
Work Phone:   
[phone]                          [facility] [address] DO  
Work Phone:   
[phone]  
   
                                Start: 2023 ambulatory      Dr. Brittney Meza                                   Facility:  
   
                                Start: 12- Chart Update    Brittney Meza  
Work Phone:   
[phone]                          [facility] DO  
Work Phone:   
[phone]  
   
                                Start: 2022 ambulatory      Dr. Roque Hartman                              Facility:9844  
   
                                Start: 2022 Adult health examination Chelsi Bland  
Other Phone:   
[phone]                           [facility]   
Professional   
Corporation  
Other Phone:   
[phone]  
   
                                Start: 2022 Chart Update    Brittney Meza  
Work Phone:   
[phone]                          [facility]  
Work Phone:   
[phone]  
   
                                                    Start: 2022  
End: 2022           ambulatory                MD Brittney Meza  
Work Phone:   
[phone]                          [facility]  
Work Phone:   
[phone]  
   
                                                    Start: 2022  
End: 2022                         Patient encounter   
procedure                               MD Brittney Meza  
Work Phone:   
[phone]                          [facility] [address]  
   
                                        Start: 11-   Office outpatient ne  
w 60   
minutes                                 Brittney Meza  
Work Phone:   
[phone] [place] [address]  
Work Phone:   
[phone]  
   
                                Start: 11- ambulatory      Dr. Roqeu Hartman                              Facility:[id number]  
   
                                                    Start: 10-  
End: 10-     ambulatory          RADHA MONROE     Facility:H1  
   
                                                    Start: 10-  
End: 10-     ambulatory          RADHA MONROE     Facility:H1  
   
                                                    Start: 10-  
End: 10-     ambulatory          MIKAELA SUAREZ     Facility:H1  
   
                          Start: 10- Ambulatory   ROQUE TAMEZATULMAHESH Faci  
lity:1532  
  
  
  
Procedures  
  
  
                          Date         Procedure    Procedure Detail Performing   
Clinician  
   
                                        Start: 2025   [facility] prim src wet roma  
nt nfct   
agt                                                 Bree A Visci DO  
Work Phone:   
[phone]  
   
                          Start: 2025 Mammography               Bree Vi  
sci DO  
Work Phone:   
[phone]  
   
                          Start: 2024 US scan of thyroid              Pedro Meza MD  
Work Phone:   
[phone]  
   
                                        Start: 2024   Computed tomography   
of   
abdomen and pelvis with   
contrast                                            Brittney Meza MD  
Work Phone:   
[phone]  
   
                          Start: 2024 CT of thorax with contrast            
    Brittney Meza MD  
Work Phone:   
[phone]  
   
                                        Start: 10-   Urnls dip stick/tabl  
et rgnt   
non-auto w/o micrscp                                Kathi KELLER  
Work Phone:   
[phone]  
   
                                        Start: 10-   Bacteria identified   
in   
Urine by Culture                                    MD Brittney Meza  
Work Phone:   
[phone]  
   
                          Start: 10- Urine culture              Britteny brandon MD  
Work Phone:   
[phone]  
   
                                        Start: 2024   URETHRITIS/DISCHARGE  
 PLUS   
VAGINITIS (HTRX)                                    Ozzie Que DO  
Work Phone:   
[phone]  
   
                                        Start: 2024   [facility] prim src wet roma  
nt nfct   
agt                                                 Bree A Visci DO  
Work Phone:   
[phone]  
   
                                        Start: 2024   X-ray of lumbar spin  
e, two   
or three views                                      MD Brittney Meza  
Work Phone:   
[phone]  
   
                          Start: 2024 Mammography               Bree Vi  
sci DO  
Work Phone:   
[phone]  
   
                          Start: 2022 Plain chest X-ray              MD Giselle Meza  
Work Phone:   
[phone]  
   
                                        Start: 2021   Microscopic observat  
ion   
[Identifier] in Cervix by   
Cyto stain                                          Bree Kamara DO  
Work Phone:   
[phone]  
   
                          Start: 2019 Total colonoscopy              Sara Meza  
Work Phone:   
[phone]  
   
                                        Start: 2014   General examination   
of   
patient                                             [place] Bland  
Other Phone:   
[phone]  
   
                          Start: 2011 Colonoscopy               Bree Vi  
sci DO  
Work Phone:   
[phone]  
   
                                       Operation on lung              Brittney brown  
Work Phone:   
[phone]  
   
                                                            Tonsillectomy and   
adenoidectomy                                       Brittney Meza  
Work Phone:   
[phone]  
  
  
  
Plan of Treatment  
  
  
                          Date         Care Activity Detail       Author  
   
                                                    Start: 2026  
End: 2026                         Patient encounter   
procedure                               2026 2:30 PM EST   
Office Visit [facility]   
[address]   
[place] [address] [phone] Bree Kamara, DO [address] [place]   
[address] [phone] (Work)   
[phone] (Fax)                      [facility]  
   
                                                    Start: 2026  
End: 2026                         DBT Breast - bilateral   
screening                               Bilateral screening   
mammogram with   
tomosynthesis Imaging   
Routine Encounter for   
screening mammogram for   
malignant neoplasm of   
breast Expected:   
2026, Expires:   
2026                              [facility]  
   
                                        Comment on above:   Expected: 2026  
, Expires: 2026   
   
                                        Start: 2026   Screening for malign  
ant   
neoplasm of breast        Mammogram                 [facility]  
   
                                                    Start: 10-  
End: 10-                         Patient encounter   
procedure                               10/21/2025 2:30 PM EDT   
Office Visit [facility]   
[address]   
[place] [address]   
[phone] Roque Hartman  [address] [place] [address] [phone]   
(Work) [phone] (Fax)               [facility]  
   
                                                    Start: 2025  
End: 2025                         Patient encounter   
procedure                               2025 10:45 AM EDT   
[facility]ment [facility]   
[facility] [address]   
[address] [place]   
[address] [phone]                 [facility]  
   
                                                    Start: 2025  
End: 2025                         Patient encounter   
procedure                               2025 11:45 AM EDT   
Office Visit [facility] [address] [place]   
[address] [phone]   
Lorenzo Kirby  [address] [facility]   
Medical Office [facility]   
[address] [place] [address]   
[phone] (Work)   
[phone] (Fax)                      [facility]  
   
                                                    Start: 2025  
End: 2025                         Patient encounter   
procedure                               2025 8:30 AM EDT   
Appointment [address] [place] [address] [phone]              [facility]  
   
                                                    Start: 2025  
End: 2025                         Patient encounter   
procedure                               2025 10:30 AM EDT   
Appointment [address] [place]   
[address] [phone]                 [facility]  
   
                                                    Start: 2025  
End: 2026                         CBC panel - Blood by   
Automated count                         CBC Lab Routine Shortness   
of breath on exertion   
Chest pain, unspecified   
type Expected: 2025   
(Approximate), Expires:   
2026                              [facility]  
Work Phone:   
[phone]  
   
                                        Comment on above:   Expected: 2025  
 (Approximate), Expires: 2026   
   
                                                    Start: 2025  
End: 2026                         Comprehensive metabolic   
2000 panel - Serum or   
Plasma                                  Comprehensive Metabolic   
Panel Lab Routine   
Shortness of breath on   
exertion Chest pain,   
unspecified type Expected:   
2025 (Approximate),   
Expires: 2026                     [facility]  
Work Phone:   
[phone]  
   
                                        Comment on above:   Expected: 2025  
 (Approximate), Expires: 2026   
   
                                                    Start: 2025  
End: 2026                         CTA Heart and Coronary   
arteries WO and W   
contrast IV                             CT angio coronary art with   
heartflow if score >30%   
Imaging Routine New-onset   
angina (CMS-HCC) Expected:   
2025, Expires:   
2026                              [facility]  
Work Phone:   
[phone]  
   
                                        Comment on above:   Expected: 2025  
, Expires: 2026   
   
                                                    Start: 2025  
End: 2026                         Lipid 1996 panel -   
Serum or Plasma                         Lipid Panel Lab Routine   
Hyperlipidemia,   
unspecified hyperlipidemia   
type Expected: 2025   
(Approximate), Expires:   
2026                              [facility]  
Work Phone:   
[phone]  
   
                                        Comment on above:   Expected: 2025  
 (Approximate), Expires: 2026   
   
                                                    Start: 2025  
End: 2026                         Cardiac stress study   
Procedure                               Stress Test Cardiac   
Services Routine Chest   
pain, unspecified type   
Shortness of breath on   
exertion Premature   
ventricular contractions   
Expected: 2025,   
Expires: 2026                     [facility]  
Work Phone:   
[phone]  
   
                                        Comment on above:   Expected: 2025  
, Expires: 2026   
   
                                                    Start: 2025  
End: 2027            Heart Transthoracic    Transthoracic Echo   
Complete Echocardiography   
Routine Chest pain,   
unspecified type Shortness   
of breath on exertion   
Premature ventricular   
contractions Expected:   
2025 (Approximate),   
Expires: 2027                     [facility]  
Work Phone:   
[phone]  
   
                                        Comment on above:   Expected: 2025  
 (Approximate), Expires: 2027   
   
                                                    Start: 2025  
End: 2025                         Professional /   
ancillary services   
management                              2025 3:30 PM EST   
Ancillary Procedure [facility]   
 MR [address] [place] [address] [phone]                            [facility]  
   
                                                    Start: 2025  
End: 2025                         Patient encounter   
procedure                               2025 2:15 PM EST   
Office Visit [facility]   
[address]   
[place] [address] [phone] ClaribelBree tinsley, DO [address] [place]   
[address] [phone] (Work)   
[phone] (Fax)                      [facility]  
   
                                        Start: 2025   Screening for malign  
ant   
neoplasm of breast        Mammogram                 [facility]  
   
                                        Start: 2024   Screening for malign  
ant   
neoplasm of cervix                                  [facility]  
   
                          Start: 10-                           [facility]  
   
                                                    Start: 10-  
End: 10-                         Campylobacter culture,   
stool                                   Campylobacter culture,   
stool Microbiology Routine   
Diarrhea, unspecified type   
Expected: 10/30/2024   
(Approximate), Expires:   
10/30/2025                              [facility]  
   
                                        Comment on above:   Expected: 10/30/2024  
 (Approximate), Expires: 10/30/2025   
   
                                                    Start: 10-  
End: 10-                         Ova and parasite   
examination                             Ova and parasite   
examination Microbiology   
Routine Diarrhea,   
unspecified type Expected:   
10/30/2024 (Approximate),   
Expires: 10/30/2025                     [facility]  
   
                                        Comment on above:   Expected: 10/30/2024  
 (Approximate), Expires: 10/30/2025   
   
                                                    Start: 10-  
End: 10-           Stool culture             Stool culture Microbiology   
Routine Diarrhea,   
unspecified type Expected:   
10/30/2024 (Approximate),   
Expires: 10/30/2025                     [facility]  
   
                                        Comment on above:   Expected: 10/30/2024  
 (Approximate), Expires: 10/30/2025   
   
                                                    Start: 10-  
End: 10-           Vibrio culture, stool     Vibrio culture, stool Lab   
Routine Diarrhea,   
unspecified type Expected:   
10/30/2024 (Approximate),   
Expires: 10/30/2025                     [facility]  
   
                                        Comment on above:   Expected: 10/30/2024  
 (Approximate), Expires: 10/30/2025   
   
                                                    Start: 10-  
End: 10-           Yersinia culture, stool   Yersinia culture, stool   
Microbiology Routine   
Diarrhea, unspecified type   
Expected: 10/30/2024   
(Approximate), Expires:   
10/30/2025                              [facility]  
   
                                        Comment on above:   Expected: 10/30/2024  
 (Approximate), Expires: 10/30/2025   
   
                                                    Start: 10-  
End: 10-                         Patient encounter   
procedure                               10/30/2024 10:20 AM EDT   
Office Visit [facility]   
[address] [place] [id number] [phone] Kathi Carrasco   
 [facility] [place], [facility] [phone] (Work)   
[phone] (Fax) Arrived              [facility]  
   
                                        Comment on above:   Arrived   
   
                                        Start: 10-   Bacteria identified   
in   
Urine by Culture          Urine Culture             [facility]  
   
                          Start: 10- Urine culture              [facility]  
   
                                                    Start: 2024  
End: 2024                         Patient encounter   
procedure                               2024 1:10 PM EDT   
Office Visit [facility]   
[address] [place] [id number] [phone] Kathi Carrasco,   
 [facility] [place] [address] [phone] (Work)   
[phone] (Fax) Arrived              [facility]  
   
                                        Comment on above:   Arrived   
   
                                                    Start: 2024  
End: 2024                         Patient encounter   
procedure                               2024 10:00 AM EDT   
Office Visit [facility]   
[address]   
[place] [id number] [phone] Bree Kamara,  [address] [place]   
[address] [phone] (Work)   
[phone] (Fax) Arrived              [facility]  
   
                                        Comment on above:   Arrived   
   
                                        Start: 2024   COVID-19 Vaccine ( season)                         COVID-19 Vaccine (4 -   
2023-24 season)                         [facility]  
   
                                        Start: 2024   COVID-19 Vaccine (4   
-   
2024-25 season)                         COVID-19 Vaccine ( season)                         [facility]  
   
                          Start: 2024 Influenza vaccination Influenza Vacc  
ine (#1) [facility]  
   
                                        Start: 10-   [facility]UV, Provider:   
Roque Hartman,   
Status: Pen, Time: 2:10   
PM                                      [facility], Provider:   
Roque Hartman,   
Status: Pen, Time: 2:10 PM              -[facility]   
[employer]   
[place]  
Work Phone:   
[phone]  
   
                                        Start: 2023   [facility], Provider:   
Roque Hartman,   
Status: Pen, Time: 3:20   
PM                                      [facility], Provider:   
Roque Hartman,   
Status: Pen, Time: 3:20 PM              [facility] [address]   
[place]  
Work Phone:   
[phone]  
   
                                        Start: 2022   STRESS CORINE, Provider  
:   
[place] [facility]   
01,[id number], Status:   
Pen, Time: 2:00 PM                      STRESS CORINE, Provider:   
[place] [facility]   
01,[id number], Status:   
Pen, Time: 2:00 PM                      [facility]   
[place]  
Work Phone:   
[phone]  
   
                                        Start: 2021   Screening for malign  
ant   
neoplasm of colon                                   [facility]  
   
                                        Start: 09-   Pneumococcal   
vaccination                             Pneumococcal Vaccine (1 of   
1 - PCV)                                [facility]  
   
                                        Start: 09-   Zoster Vaccines (1 o  
f   
2)                        Zoster Vaccines (1 of 2)  [facility]  
   
                                        Start: 2009   MMR Vaccines (1 of 1  
 -   
Standard series)                        MMR Vaccines (1 of 1 -   
Standard series)                        [facility]  
   
                                        Start: 09-   Screening for malign  
ant   
neoplasm of cervix        HPV/Cotest                [facility]  
   
                                        Start: 09-   DTaP/Tdap/Td Vaccine  
s   
(1 - Tdap)                              DTaP/Tdap/Td Vaccines (1 -   
Tdap)                                   [facility]  
   
                                        Start: 09-   Screening for malign  
ant   
neoplasm of cervix                                  [facility]  
   
                                        Start: 09-   Pneumococcal   
vaccination                             Pneumococcal Vaccine (1 of   
2 - PCV)                                [facility]  
   
                          Start: 09- Hepatitis C screening Hepatitis C [facility]  
   
                          Start: 1970 HIV screening HIV Screening [facility]  
   
                          Start: 1970 Lipid panel  Lipid Panel  [facility]  
   
                                        Start: 1969   Screening for malign  
ant   
neoplasm of colon                                   [facility]  
   
                          Start: 1970 Yearly Adult Physical Yearly Adult P  
[facility]  
   
                                                            Bacteria identified   
in   
Unspecified specimen by   
Culture                                             [facility]  
   
                                                            Bacteria identified   
in   
Urine by Culture                        Urine culture Microbiology   
Routine Urine troubles   
Ordered: 10/30/2024                     [facility]  
Work Phone:   
[phone]  
   
                                        Comment on above:   Ordered: 10/30/2024   
   
                                                            Comprehensive metabo  
lic   
2000 panel - Serum or   
Plasma                                              [facility]  
   
                                                            CT Abdomen and Pelvi  
s W   
contrast IV                                         [facility]  
   
                                                            CT Chest WO and W   
contrast IV                                         [facility]  
   
                                                            IGP, APT HPV,RFX   
16/18,45                                IGP, APT HPV,RFX 16/18,45   
Lab Routine Screening for   
malignant neoplasm of   
cervix Screening for HPV   
(human papillomavirus)   
Ordered: 2025                     [facility]  
Work Phone:   
[phone]  
   
                                        Comment on above:   Ordered: 2025   
   
                                       Patient Education Sacroiliac Joint Pain (  
DC) [facility]  
Work Phone:   
[phone]  
   
                                       Patient referral              [facility]  
Work Phone:   
[phone]  
   
                                                            XR Lumbar spine 2 or  
 3   
Views                                               [facility]  
   
                                                            Yersinia sp identifi  
ed   
in Unspecified specimen   
by Organism specific   
culture                                             [facility]  
  
  
  
Immunizations  
  
  
                      Immunization Date Immunization Notes      Care Provider Nilda amado  
   
                                        10-          influenza, injectabl  
e,   
madin severiano canine   
kidney, preservative   
free                                                Roque Hartman MD  
Work Phone:   
[phone]                          [facility]  
   
                                        10-          influenza, injectabl  
e,   
quadrivalent,   
preservative free                                   Brittney Meza  
Work Phone:   
[phone]                          [facility]   
[facility]   
[place]  
Work Phone:   
[phone]  
   
                                        Comment on above:   Series:   
   
                                        10-          influenza, seasonal,  
   
injectable                                          Bree Kamara DO  
Work Phone:   
[phone]                          [facility]  
   
                                        10-          influenza virus   
vaccine, unspecified   
formulation                                         Bree Kamara DO  
Work Phone:   
[phone]                          [facility]  
   
                                        10-          Moderna COVID-19   
Vaccine 100 MCG/0.5ML   
Intramuscular   
Suspension                                          Brittney Meza  
Work Phone:   
[phone]                          [facility]   
[place]  
Work Phone:   
[phone]  
   
                                        2021          Moderna COVID-19   
Vaccine 100 MCG/0.5ML   
Intramuscular   
Suspension                                          Brittney BARRON Meza  
Work Phone:   
[phone]                          [facility]   
[place]  
Work Phone:   
[phone]  
   
                                        2020          Moderna COVID-19   
Vaccine 100 MCG/0.5ML   
Intramuscular   
Suspension                                          Brittney BARRON Meza  
Work Phone:   
[phone]                          [facility]   
[place]  
Work Phone:   
[phone]  
   
                                        10-          influenza, seasonal,  
   
injectable,   
preservative free                                   Brittney BARRON Meza  
Work Phone:   
[phone]                          [facility]   
[place]  
Work Phone:   
[phone]  
   
                                        10-          influenza, seasonal,  
   
injectable                                          Brittney BARRON Emza  
Work Phone:   
[phone]                          [facility]  
   
                                        Comment on above:   Series:   
   
                                        10-          novel   
influenza-H1N1-09,   
preservative-free,   
injectable                                          Brittney BARRON Meza  
Work Phone:   
[phone]                          [facility] [address]   
[place]  
Work Phone:   
[phone]  
   
                                        2007          hepatitis A vaccine,  
   
pediatric/adolescent   
dosage, 2 dose schedule                             Brittney BARRON Meza  
Work Phone:   
[phone]                          [facility]   
[place]  
Work Phone:   
[phone]  
   
                                        2007          hepatitis B vaccine,  
   
adult dosage                                        Brittney BEATRICE Meza  
Work Phone:   
[phone]                          [facility]   
[place]  
Work Phone:   
[phone]  
   
                                        2007          meningococcal   
polysaccharide vaccine   
(MPSV4)                                             Brittney Meza  
Work Phone:   
[phone]                          [facility]   
[place]  
Work Phone:   
[phone]  
   
                                        2007          typhoid vaccine, kay  
e,   
oral                                                Brittney BARRON Meza  
Work Phone:   
[phone]                          [facility]   
DO  
Work Phone:   
[phone]  
   
                          2007   yellow fever vaccine              Brittney Meza  
Work Phone:   
[phone]                          [facility]   
[place]  
Work Phone:   
[phone]  
   
                                        2007          hepatitis B vaccine,  
   
adult dosage                                        Brittney BARRON Kwabena  
Work Phone:   
[phone]                          [facility]   
[place]  
Work Phone:   
[phone]  
   
                                        2007          hepatitis A vaccine,  
   
pediatric/adolescent   
dosage, 2 dose schedule                             Brittney BARRON Kwabena  
Work Phone:   
[phone]                          [facility]   
[place]  
Work Phone:   
[phone]  
   
                                        2007          hepatitis B vaccine,  
   
adult dosage                                        Brittney BARRON Kwabena  
Work Phone:   
[phone]                          [facility] [address]   
[place]  
Work Phone:   
[phone]  
  
  
  
Payers  
  
  
                          Date         Payer Category Payer        Policy ID  
   
                          2024   Self-pay                  [id number]  
[phone]  
[id number]  
   
                                2023      [facility]  
er Subscriber   
Plan / Payer (Effective   
2023-Present) Name:   
Kathi Self Member ID:   
[id number] Relation to   
Subscriber: Self Name:   
Kathi Self Subscriber   
ID: [id number] Payer   
ID: Not on file Group ID:   
[id number] Type: Not on   
file Phone: [phone]   
Address: [address]   
[place] [address]                  [id number]  
[id number]  
   
                                        2023          [facility] Member   
Subscriber Plan / Payer   
(Effective   
2023-Present) Name:   
Kathi Self Member ID:   
[id number] Relation to   
Subscriber: Self Name:   
Kathi Self Subscriber   
ID: [id number] Payer   
ID: [id number] (NAIC) Group ID:   
[id number] Type: Not on   
file Address: [address]   
[address] [place]   
[address]                              [id number]  
[id number]  
   
                          2023   Unknown                     
   
                          2023   Unknown                   [id number]  
   
                          2020   Unknown                   [id number]  
   
                          1970   Unknown                   [id number]   
[id number]  
[id number]  
   
                          1969   Unknown                   [id number]   
[id number]  
593  
   
                          1969   Unknown                   [id number]   
[id number]  
[id number]  
   
                          1969   Unknown                   [id number]   
[id number]  
[id number]  
   
                          1969   Unknown                   [id number]   
[id number]  
[id number]  
   
                          1969   Unknown                   [id number]   
[id number]  
[id number]  
   
                          1969   Unknown                   [id number]   
[id number]  
[id number]  
   
                          1969   Unknown                   [id number]   
[id number]  
[id number]  
   
                          1969   Unknown                   [id number]   
[id number]  
[id number]  
   
                          1969   Unknown                   [id number]   
[id number]  
1259  
   
                          1969   Unknown                   [id number]   
[id number]  
1259  
   
                          1969   Unknown                   [id number]   
[id number]  
196  
   
                          1969   Unknown                   [id number]   
[id number]  
[id number]  
   
                          1969   Unknown                   [id number]   
[id number]  
[id number]  
   
                          1969   Unknown                   [id number]   
[id number]  
[id number]  
   
                          1960   Self-pay                  [id number]  
   
                                       Unknown                   [id number]  
   
                                       Unknown                   [id number]   
[id number]  
593  
   
                                       Unknown                   [id number]  
   
                                       Unknown                   [id number]   
[id number]  
531  
   
                                       Unknown                   [id number]   
[id number]  
531  
   
                                       Unknown                   [id number]   
[id number]  
531  
   
                                       Unknown                   [id number]   
[id number]  
[id number]  
   
                                       Unknown                   [id number]   
[id number]  
[id number]  
   
                                       Unknown                   [id number]   
[id number]  
[id number]  
   
                                       Unknown                   [id number]   
[id number]  
531  
  
  
  
Social History  
  
  
                          Date         Type         Detail       Facility  
   
                                                    Start: 10-  
End: 10-     No illicit drug use No illicit drug use [facility]  
Work Phone:   
[phone]  
   
                                        Comment on above:   2 cups coffee daily,  
 couple sodas daily;   
   
                                                            social;   
   
                                                            quit 2019;   
   
                                                    Start: 2020  
End: 2024                         Tobacco smoking   
status NHIS               Smoker (finding)          [facility]  
   
                          Start: 1970 Sex Assigned At Birth Female       F  
[facility]  
   
                                                    Start: 10-  
End: 2025     Sex Assigned At Birth                     [facility]   
[employer]   
[employer]  
Other Phone:   
[phone]  
   
                                        Start: 10-   Tobacco smoking   
status NHIS               Ex-smoker                 [facility]  
   
                                                      
End: 2020                         History of tobacco   
use                       Cigarette Smoker          [facility]  
   
                                                    Start: 2023  
End: 10-                         Tobacco use and   
exposure                                Smokeless tobacco   
non-user                                [facility]  
   
                                                    Start: 10-  
End: 2025                         Alcoholic beverage   
intake                                  Current drinker of   
alcohol (finding)                       [facility]  
Work Phone:   
[phone]  
   
                          Start: 10- Alcohol Comment socially     [facility]  
[facility]  
Work Phone:   
[phone]  
   
                          Start: 1970 Sex assigned at birth Not on file  N  
[facility]  
   
                                                    Start: 2024  
End: 2025                         Exposure to   
SARS-CoV-2 (event)        Not sure                  [facility]  
   
                                        Start: 2023   Tobacco smoking   
status NHIS               Smokes tobacco daily      [facility]  
   
                                                    Start: 2024  
End: 2025                         Alcoholic beverage   
intake                    Ex-drinker (finding)      [facility]  
   
                                                            How often to you hav  
e   
a drink containing   
alcohol?                  Monthly or less           [facility]  
   
                                                            How many standard   
drinks containing   
alcohol do you have   
on a typical day?         1 or 2                    [facility]  
   
                                                            How often do you hav  
e   
6 or more drinks on 1   
occasion?                 Never                     [facility]  
   
                                Start: 2023 Tobacco Comment > Other tobacc  
o use:   
Yes, Vape                               [facility]  
   
                                Start: 2023 Alcohol Comment Caffeine intak  
e: 1-2   
cups per day                            [facility]  
   
                                Start: 03- Gender identity Identifies as   
female   
gender (finding)                        [facility]  
   
                                                    Start: 2024  
End: 2025     Sex                 Female (finding)    [facility]  
   
                                                    NEGATED: Highlighted   
row                                     pregnant            [facility]  
  
  
  
Clinical Notes 2023 to 2025  
Lorenzo Kirby MD - 2025 2:00 PM EDTPatient InstructionsMowillem Hartman MD - 2025 3:20 PM EDTPatient Instructions  
  
                                Note Date & Type Note            Facility  
   
                                                    2025 History of   
Present illness Narrative               Formatting of this note is   
different from the original.  
CARDIOLOGY OFFICE VISIT  
  
CHIEF COMPLAINT  
Chief complaint:  
Chief Complaint  
Patient presents with  
Follow-up  
Second Opinion  
Chest pain  
  
HISTORY OF PRESENT ILLNESS  
The patient is being seen today   
for a second opinion regarding   
her chest pain. The does have a   
past history of surgery for   
resection of lung blebs in   
. She has had atypical   
chest discomfort on and off   
since then. Recently she has   
had a different type of chest   
discomfort which concerns her.   
The chest discomfort is a heavy   
discomfort in her chest and   
below her left breast. There is   
radiation to her left shoulder   
and her left arm. There is   
heaviness in her left arm with   
this. She also has a dull   
tingling sensation left side of   
her neck. The episodes last 15   
minutes to 1 hour. The episodes   
can occur with exertion and at   
rest. This is definitely   
different than the symptoms she   
has had in the past. She has   
been bothered by these for the   
last 4 weeks or so. She does   
have some mild dyspnea at times   
with the discomfort. She has   
some brief palpitations. She   
denies any prolonged   
palpitations. The palpitations   
are a fluttering like   
sensation. She does have some   
brief lightheaded spells she   
denies any syncopal episodes or   
near syncopal episodes. She did   
have graded exercise test in   
 which was normal. She   
exercised for 10 minutes   
according to [facility] protocol.   
She states she had an   
echocardiogram done 5 years ago   
which she states was normal I   
do not have that result. She   
has not had a recent lab work.   
I told her like to obtain CBC,   
chemistry profile, and lipid   
profile. She did recently have   
a CT scan of the chest and   
abdomen by her family doctor.   
There was some note of possible   
thyroid problem. She did have   
ultrasound of thyroid which is   
okay. There was noted to be   
atherosclerotic calcifications   
of the abdominal aorta and   
branch vessels. I explained to   
her what this means.  
  
Impression:  
CAD manifested by coronary   
calcium score of 20, 2023  
Chest discomfort, new onset   
angina pectoris  
Family history of coronary   
artery disease ,mother has had   
PCI with stenting of her LAD  
Former smoker  
  
Plan:  
CT coronary angiogram  
Follow-up after CT coronary   
angiogram to go over results of   
lab work and that study.  
  
Please excuse any errors in   
grammar or translation related   
to this dictation. Voice   
recognition software was   
utilized to prepare this   
document.  
  
Past Medical History:  
She has no past medical history   
on file.  
  
Past Surgical History:  
She has a past surgical history   
that includes Other surgical   
history (2022); Other   
surgical history (2022);   
and Other surgical history   
(11/10/2022).  
  
Social History:  
She reports that she quit   
smoking about 5 years ago. Her   
smoking use included   
cigarettes. She has never used   
smokeless tobacco. She reports   
current alcohol use of about   
3.0 standard drinks of alcohol   
per week. She reports that she   
does not use drugs.  
  
Family History:  
Family History  
Problem Relation Name Age of   
Onset  
Other (chest pain) Mother  
  
  
Allergies:  
Patient has no known allergies.  
  
Outpatient Medications:  
Current Outpatient Medications  
Medication Instructions  
ALPRAZolam (XANAX) 0.25 mg, As   
needed  
famotidine (PEPCID) 20 mg,   
Nightly  
metoprolol tartrate (Lopressor)   
50 mg tablet Take 1 tablet by   
mouth 1 hour prior to CT   
coronary angiogram  
Mimvey 1-0.5 mg tablet 1   
tablet, Daily  
omeprazole (PRILOSEC) 20 mg,   
Daily before breakfast  
  
  
Last Recorded Vitals:  
Vitals:  
25 1413  
BP: 134/84  
BP Location: Left arm  
Patient Position: Sitting  
Pulse: 84  
Weight: 54.7 kg (120 lb 11.2   
oz)  
Height: 1.613 m (5' 3.5 )  
  
  
  
  
Lorenzo Kirby MD   
since then she has been having   
intermittent episodes of chest   
discomfort. She has undergone  
  
Past Medical History  
History reviewed. No pertinent   
past medical history.  
  
Social History  
Social History  
  
Tobacco Use  
Smoking status: Former  
Current packs/day: 0.00  
Types: Cigarettes  
Quit date:   
Years since quittin.2  
Smokeless tobacco: Never  
Substance Use Topics  
Alcohol use: Yes  
Alcohol/week: 3.0 standard   
drinks of alcohol  
Types: 3 Standard drinks or   
equivalent per week  
Comment: socially  
Drug use: Never  
  
Family History  
  
Family History  
Problem Relation Name Age of   
Onset  
Other (chest pain) Mother  
  
  
Allergies:  
No Known Allergies  
  
Outpatient Medications:  
Current Outpatient Medications  
Medication Instructions  
ALPRAZolam (XANAX) 0.25 mg, As   
needed  
famotidine (PEPCID) 20 mg,   
Nightly  
Mimvey 1-0.5 mg tablet 1   
tablet, Daily  
omeprazole (PRILOSEC) 20 mg,   
Daily before breakfast  
  
  
REVIEW OF SYSTEMS  
Review of Systems  
All other systems reviewed and   
are negative.  
  
VITALS  
There were no vitals filed for   
this visit.  
  
PHYSICAL EXAM  
Vitals and nursing note   
reviewed.  
Constitutional:  
Appearance: Healthy appearance.   
Not in distress.  
Eyes:  
Conjunctiva/sclera:   
Conjunctivae normal.  
Pupils: Pupils are equal,   
round, and reactive to light.  
Neck:  
Vascular: No JVR. JVD normal.  
Pulmonary:  
Effort: Pulmonary effort is   
normal.  
Breath sounds: Normal breath   
sounds. No wheezing. No   
rhonchi. No rales.  
Chest:  
Chest wall: Not tender to   
palpatation.  
Cardiovascular:  
PMI at left midclavicular line.   
Normal rate. Regular rhythm.   
Normal S1. Normal S2.  
Murmurs: There is no murmur.  
No gallop. No click. No rub.  
Pulses:  
Intact distal pulses.  
Edema:  
Peripheral edema absent.  
Abdominal:  
General: Bowel sounds are   
normal.  
Palpations: Abdomen is soft.  
Tenderness: There is no   
abdominal tenderness.  
Musculoskeletal: Normal range   
of motion.  
General: No tenderness. Skin:  
General: Skin is warm and dry.  
Neurological:  
General: No focal deficit   
present.  
Mental Status: Alert and   
oriented to person, place and   
time.  
  
ASSESSMENT AND PLAN  
Diagnoses and all orders for   
this visit:  
Hyperlipidemia, unspecified   
hyperlipidemia type  
Shortness of breath on exertion  
Chest pain, unspecified type  
Family history of coronary   
artery disease  
Palpitations  
BMI 21.0-21.9, adult  
Dizziness  
Former smoker  
New-onset angina (CMS-HCC)  
  
[unfilled]  
  
I,Clara Penaloza LPN am   
scribing for, and in the   
presence of Dr. Lorenzo Kirby.  
  
I, Dr. Lorenzo Kirby, personally   
performed the services   
described in the documentation   
as scribed by Clara Penaloza LPN in my presence, and confirm   
it is both accurate and   
complete.  
  
Dr. Lorenzo Restrepo MD  
Thank you for allowing me to   
participate in the care of this   
patient. Please do not hesitate   
to contact me with any further   
questions or concerns.  
Electronically signed by   
Lorenzo Kirby MD at   
2025 5:19 PM EDT  
documented in this encounter            [facility]  
Work Phone: [phone]  
   
                                        2025 Instructions   
  
  
Clara Penaloza LPN -   
2025 2:00 PM   
EDTFormatting of this note   
might be different from the   
original.  
You will be scheduled for a CT   
coronary angiogram in the near   
future  
Please have blood work done   
soon.  
A prescription for Metoprolol   
Tartrate 50 mg has been sent to   
your pharmacy. Please take this   
1 hour prior to your CT scan to   
lower your heart rate for the   
test.  
Patient to follow up after   
testing.  
  
Continue same   
medications/treatment.  
Patient educated on proper   
medication use.  
Patient educated on risk factor   
modification.  
Please bring any lab results   
from other providers /   
physicians to your next   
appointment.  
  
Please bring all medicines,   
vitamins and herbal supplements   
with you when you come to the   
office.  
  
Prescriptions will not be   
filled unless you are compliant   
with your follow up   
appointments or have a follow   
up  
appointment scheduled as per   
instruction of your physician.   
Refills should be requested at   
the time of  
Your visit.  
  
Electronically signed by   
Clara Penaloza LPN at   
2025 2:43 PM EDT  
Electronically signed by   
Clara Penaloza LPN at   
2025 2:44 PM EDT  
  
documented in this encounter            [facility]  
Work Phone: [phone]  
   
                                                    2025 History of   
Present illness Narrative               Formatting of this note is   
different from the original.  
Chief Complaint  
Patient presents with  
Follow-up  
Patient request - CP, left   
shoulder, neck pain  
  
Subjective  
Kathi Self is a 54 y.o. female  
  
HPI  
Patient is here for follow-up   
continue management for   
previous evaluation for chest   
pain and shortness of breath.   
She had remote surgery for   
resection of lung blebs and   
since then has been complaining   
of ongoing intermittent   
episodes of chest pain and   
shortness of breath she had   
undergone several evaluation in   
the past including stress test   
and calcium scoring. She   
recently have increase of   
frequency of left-sided chest   
pain radiated to the left arm.   
Nonexertional. She discussed   
some symptom fatigue and   
tiredness. Occasional   
lightheadedness and palpitation   
she admit the symptoms make her   
anxious. She did undergo CT   
scan of the chest recently   
which I was able to review   
online and showed no acute   
pathology.  
  
ASSESSMENT:  
  
1. Continue complaint of   
atypical chest pain appears   
musculoskeletal or pleuritic   
the patient had a remote   
history of resection of lung   
bleb. Remote stress test is   
reassuring. Calcium scoring was   
low  
2. reformed smoker  
3. hyperlipidemia. Does not   
meet criteria for treatment   
recent lab work noted and   
reviewed with her  
4. minor shortness breath   
related to prior tobacco use   
functional class I with   
excellent exercise tolerance  
5. Documentation of few   
premature ventricular   
contractions, felt to be   
benign. In the past with no   
recurrence  
  
RECOMMENDATION:  
  
1. I advised the patient to   
proceed with GXT and   
echocardiogram  
2. I recommended continue with   
observant approach discussed   
with her risk factor   
modification  
3. I discussed with her risk   
factor modification  
4. I advised her to notify me   
with changes of cardiac status   
or symptoms I will see her back   
in 6 months  
Review of Systems  
Constitutional: Positive for   
malaise/fatigue.  
Cardiovascular: Positive for   
chest pain and palpitations.  
Respiratory: Positive for   
shortness of breath.  
Neurological: Positive for   
light-headedness.  
  
  
Vitals:  
25 1517  
BP: 124/74  
BP Location: Left arm  
Patient Position: Sitting  
Pulse: 76  
Weight: 54.4 kg (120 lb)  
Height: 1.6 m (5' 3 )  
  
  
Objective  
Physical Exam  
Constitutional:  
Appearance: Normal appearance.  
HENT:  
Nose: Nose normal.  
Neck:  
Vascular: No carotid bruit.  
Cardiovascular:  
Rate and Rhythm: Normal rate.  
Pulses: Normal pulses.  
Heart sounds: Normal heart   
sounds.  
Pulmonary:  
Effort: Pulmonary effort is   
normal.  
Abdominal:  
General: Bowel sounds are   
normal.  
Palpations: Abdomen is soft.  
Musculoskeletal:  
General: Normal range of   
motion.  
Cervical back: Normal range of   
motion.  
Right lower leg: No edema.  
Left lower leg: No edema.  
Skin:  
General: Skin is warm and dry.  
Neurological:  
General: No focal deficit   
present.  
Mental Status: She is alert.  
Psychiatric:  
Mood and Affect: Mood normal.  
Behavior: Behavior normal.  
Thought Content: Thought   
content normal.  
Judgment: Judgment normal.  
  
Allergies  
Patient has no known allergies.  
  
Current Medications  
  
Current Outpatient Medications:  
ALPRAZolam (Xanax) 0.25 mg   
tablet, Take 1 tablet (0.25 mg)   
by mouth if needed for   
anxiety., Disp: , Rfl:  
famotidine (Pepcid) 20 mg   
tablet, Take 1 tablet (20 mg)   
by mouth once daily at   
bedtime., Disp: , Rfl:  
Mimvey 1-0.5 mg tablet, Take 1   
tablet by mouth once daily.,   
Disp: , Rfl:  
omeprazole (PriLOSEC) 20 mg DR   
capsule, Take 1 capsule (20 mg)   
by mouth once daily in the   
morning. Take before meals. Do   
not crush or chew., Disp: ,   
Rfl:  
  
  
  
Assessment/Plan  
1. Chest pain, unspecified type   
Stress Test  
Transthoracic Echo Complete  
  
2. Shortness of breath on   
exertion Stress Test  
Transthoracic Echo Complete  
  
3. Premature ventricular   
contractions Stress Test  
Transthoracic Echo Complete  
  
4. Hyperlipidemia, unspecified   
hyperlipidemia type  
  
5. Former smoker  
  
6. BMI 21.0-21.9, adult  
  
  
  
Scribe Attestation  
By signing my name below, I,   
Bettina ALBERTSKelly KOVACS , Scribe  
attest that this documentation   
has been prepared under the   
direction and in the presence   
of Roque Hartman MD.  
  
Provider Attestation - Scribe   
documentation  
  
All medical record entries made   
by the Scribe were at my   
direction and personally   
dictated by me. I have reviewed   
the chart and agree that the   
record accurately reflects my   
personal performance of the   
history, physical exam,   
discussion and plan.  
  
Electronically signed by   
Roque Hartman MD at   
2025 3:51 PM EDT  
documented in this encounter            [facility]  
Work Phone: [phone]  
   
                                        2025 Instructions   
  
  
Bettina Ornelas LPN -   
2025 3:20 PM   
EDTFormatting of this note   
might be different from the   
original.  
Please bring all medicines,   
vitamins, and herbal   
supplements with you when you   
come to the office.  
  
Prescriptions will not be   
filled unless you are compliant   
with your follow up   
appointments or have a follow   
up appointment scheduled as per   
instruction of your physician.   
Refills should be requested at   
the time of your visit.  
Electronically signed by Dulce Romero CMA at 2025 3:18   
PM EDT  
Electronically signed by Bettina Ornelas LPN at 2025   
3:37 PM EDT  
  
documented in this encounter            [facility]  
Work Phone: [phone]  
  
  
  
                                                    2025 Evaluation note   
  
  
  
                                Diagnosis       Onset Date      Resolution  
   
                      Chronic nausea                       acute         
8:26am  
   
                                                    GERD   
(gastroesophageal   
reflux disease)                                 acute           ,   
025   
8:26am  
  
  
[facility]  
Work Phone: [phone]- History of Present illness Narrative* 
  Bree Kamara DO - 2025 2:15 PM ESTFormatting of this note is 
  different from the original.  
Images from the original note were not included.  
  
Bree Kamara,  
Obstetrics and Gynecology Kathi Self 1970  
[phone]  
  
  
  
  
25  
197032  
Yearly Wellness Exam  
  
Chief Complaint  
Patient presents with  
Gynecologic Exam  
Pt presents for pap/yearly. Denies breast,bowel,bladder. Having dryness with 
intercourse. CT scan showed fibroids in her uterus she would like to discuss.  
  
Visit Vitals  
/70  
Wt 123 lb  
BMI 21.62 kg/m  
OB Status Postmenopausal  
Smoking Status Every Day  
BSA 1.58 m  
  
  
  
History of Present Illness  
The patient presents for a yearly checkup.  
  
She has been experiencing significant weight loss, approximately 20 pounds since
 2024, without any identifiable cause. She attributes this weight loss to
 stress and fatigue from her job. She reports overall body pain and perceives 
changes in her body, describing it as feeling tight. She is not experiencing hot
 flashes but reports night sweats. She has undergone blood work, which showed 
normal thyroid function. She has completed her mammogram and colonoscopy 
screenings.  
  
She reports vaginal discomfort and is concerned about potential uterine 
fibroids. She is postmenopausal and does not experience menstrual periods. She 
has been experiencing vaginal dryness, which hasled to irritation during sexual 
intercourse with her . Despite attempts to alleviate the symptoms with 
super condoms and over-the-counter products, the issue persists. She has not 
used any estrogen creams or tablets for vaginal application. She reports no 
bladder issues.  
  
She experienced a severe yeast infection in 2024, which she believes 
was triggered by sexual activity. She has noticed a recurrence of the yeast 
infection, although less severe than the previous episode. She completed a 5-day
 treatment course for the yeast infection, which resolved the issue.  
  
Supplemental Information  
She is scheduled to see an ENT specialist this week.  
  
MEDICATIONS  
Current: estrogen pills  
  
  
Current Outpatient Medications  
Medication Sig Dispense Refill  
ALPRAZolam (Xanax) 0.25 MG tablet TAKE 1 TABLET ORALLY TWICE DAILY AS NEEDED FOR
 ANXIETY FOR 30 DAYS  
estradiol-norethindrone (ActivElla) 1-0.5 MG tablet Take 1 tablet by mouth Daily
 30 tablet 11  
famotidine (Pepcid) 40 MG tablet Take 40 mg by mouth at bedtime  
fluconazole (Diflucan) 150 MG tablet 1 tab now-repeat in 4 days then take 1 pill
 weekly for 8 weeks10 tablet 0  
nystatin (Mycostatin) ointment Apply topically 2 (two) times a day Thin amount 
in combination with Triamcinolone equal parts 30 g 0  
OMEPRAZOLE PO  
ondansetron ODT (Zofran-ODT) 4 MG disintegrating tablet Q8H  
sulfamethoxazole-trimethoprim (Bactrim DS) 800-160 MG per tablet 1 tablet  
triamcinolone (Kenalog) 0.1 % ointment Apply topically 2 (two) times a day 30 g 
0  
  
No current facility-administered medications for this visit.  
  
Allergies  
Allergen Reactions  
Acetaminophen Hives  
Other Reaction(s): Hives  
Propoxyphene Hives  
Other Reaction(s): Hives  
  
Past Medical History:  
Diagnosis Date  
Cervical lesion  
Relies on partner vasectomy for contraception  
Spontaneous pneumothorax   
Dr. Simons  
  
Past Surgical History:  
Procedure Laterality Date  
CHEST TUBE INSERTION  
Lung collapse (Pneumothorax)  
COLONOSCOPY 2020  
Mild sigmoid diverticulosis - [facility] - repeat 10 years  
COLPOSCOPY   
Colpo with biopsy -Cervical lesion  
DILATION AND CURETTAGE OF UTERUS   
THORACOSCOPY W/ TALC PLEURODESIS 2008  
TONSILLECTOMY   
  
OB History  
 Para Term  AB Living  
4 4 4 4  
SAB IAB Ectopic Multiple Live Births  
  
  
# Outcome Date GA Lbr Josué/2nd Weight Sex Type Anes PTL Lv  
4 Term  
3 Term  
2 Term  
1 Term  
  
Obstetric Comments  
Pap 21- Neg, HPV Neg  
Mammogram 1/3/24- Neg  
Colonoscopy 2020- Neg, repeat 10 years ([facility])  
  
  
  
ROS  
General: Denies fevers/chills  
Eyes: Denies vision changes  
ENT: Denies neck stiffness, neck mass  
Endocrine: Denies polydipsia and polyuria  
Respiratory: Denies shortness of breath  
Cardiovascular: Denies chest pain and palpitations  
Gastrointestinal: Denies changes in bowel habits, blood in stool, constipation 
and diarrhea.  
Hematology: Denies easy bruising.  
Women Only: Denies breast masses, skin changes, nipple discharge, abnormal 
bleeding, pelvic pain and dyspareunia  
Genitourinary: Denies dysuria, pelvic pain and nocturia  
Skin: Denies rashes/lesions  
Neurologic: Denies headaches, dizziness, syncope  
Psychiatric: Denies hallucinations, suicidal ideas  
  
EXAM  
GENERAL EXAMINATION: Alert, oriented, well developed, well nourished.  
HEAD: Normocephalic, atraumatic.  
EYES: ERICK, sclera anicteric.  
EARS: No obvious hearing deficit.  
NECK/THYROID: Neck supple no cervical lymphadenopathy no thyromegaly.  
LYMPH NODES: No axillary, supraclavicular or inguinal adenopathy.  
SKIN: Warm and dry. No rashes  
HEART: Regular rate and rhythm. No murmur  
LUNGS: Clear to auscultation bilaterally.  
CHEST: Axillary nodes grossly normal.  
BREASTS: No dominant masses palpable bilaterally, no skin changes, nipple 
discharge, supra-clavicular or axillary adenopathy  
ABDOMEN: Soft, nontender, nondistended, no hernia or masses palpable.  
BACK: No obvious scoliosis/kyphosis.  
FEMALE GENITOURINARY: EFG without sores/lesions, erythematous vaginal mucosa-no 
discharge, no evidence of atrophy, cervix without lesions, uterus RV, upper 
normal size, no adnexal masses, cul-de-sac negative.  
EXTREMITIES No edema.  
NEUROLOGIC: Alert and oriented.  
PSYCH: Cooperative with exam.  
  
ICD-10-CM  
1. Encounter for gynecological examination with abnormal finding Z01.411  
  
2. Encounter for screening mammogram for malignant neoplasm of breast Z12.31 
Bilateral screening mammogram with tomosynthesis  
  
3. Screening for malignant neoplasm of cervix Z12.4 IGP, APT HPV,RFX 16/18,45  
  
4. Screening for HPV (human papillomavirus) Z11.51 IGP, APT HPV,RFX 16/18,45  
  
5. Postmenopausal HRT (hormone replacement therapy) Z79.890 estradiol-
norethindrone (ActivElla) 1-0.5 MG tablet  
  
6. Vaginal irritation N89.8 POCT trichomonas manually resulted  
  
7. Dyspareunia in female N94.10 POCT trichomonas manually resulted  
  
8. Vaginal yeast infection B37.31 fluconazole (Diflucan) 150 MG tablet  
POCT trichomonas manually resulted  
  
  
Assessment & Plan  
1. Uterine fibroids.  
The patient has a 5 cm fibroid, which is causing some discomfort. However, the 
uterus itself does not seem to be overly enlarged. The fibroid is not causing 
significant symptoms such as bleeding or bulk symptoms. No immediate 
intervention is required.  
  
2. Vaginal dryness.  
The patient reports vaginal dryness and irritation, especially during 
intercourse. She is currentlyon estrogen pills. She is advised to use 
Astroglide, an over-the-counter lubricant, for additional relief. I think some 
of her discomfort might be from the yeast infection we identified today. Since s
he seems to be getting them frequently we are going to put her on a prolonged 
treatment course.  
  
3. Recurrent yeast infections.  
The patient has experienced recurrent yeast infections, with the most recent one
 occurring in December. A microscopic examination confirmed the presence of 
yeast. A prescription for Diflucan (fluconazole) tablets will be provided. She 
is instructed to take one tablet now, repeat it in 3 to 4 days, and then 
continue with one tablet weekly for the next 8 weeks.  
  
4. Thyroid nodules.  
The patient has thyroid nodules, with the largest being 11 mm. Previous blood 
work indicated normalthyroid function. No immediate intervention is required, 
but follow-up with an ultrasound may be necessary to monitor the nodules.  
  
5. Health maintenance.  
A Pap smear will be conducted today. She has already completed her mammogram and
 colonoscopy screenings.  
  
PROCEDURE  
Colonoscopy was performed in .  
  
  
  
Electronically signed by Bree Kamara DO at 2025 6:50 PM EST  
  
documented in this encounterBarnes-[facility] [id number]- Evaluation note*   
  
                      Diagnosis  Onset Date Resolution Status     Admit Date  
   
                      Anxiety                          acute      2024 10:54am  
  
  
[facility]  
Work Phone: [phone]- Radiology [facility] [place]  
[address] [place] [address]  
  
Ultrasound Report  
Signed  
  
Patient: Kathi Self MR#: [id number]  
79  
: 1970 Acct:[id number]  
  
Age/Sex: 54 / F ADM Date:   
4  
Loc:  Room: Type: [facility]  
Attending Dr: Brittney Meza MD  
  
Ordering Provider: Brittney Meza MD  
Date of Service: 24  
Accession #: ([id number] US/US thyroid: E04.1 - Nontoxic single thyroid nodule  
  
  
Copies to: Brittney Meza MD~  
  
  
Thyroid Ultrasound  
  
HISTORY: Nontoxic thyroid nodule  
  
COMPARISON: None  
  
The RIGHT lobe measures 4.7 x 1.4 x 1.6cm.  
  
LEFT lobe measures 4.2 x 1.1 x 1.4 cm.  
  
Isthmus has an AP dimension of 0.1cm.  
  
Right mid solid cystic nodule measures up to 11 mm. Left mid medial solid cystic
 nodule measures upto 5 mm. Left superior anechoic nodule with septationmeasures
 up to 6 mm. Left superior anechoic nodule with septation measures up to 4 mm. 
Right portion of the isthmus contains a mixed echogenic nodule measuring up to 7
 mm..  
  
No microcalcifications identified.  
  
Symmetric blood flow of the thyroid gland identified.  
  
  
ORDER #: [id number] US/US thyroid  
IMPRESSION: Bilateral thyroid nodules measuring up to 11 mm.  
  
Impression dictated by: Zach Le M.D.2024 4:37 PM  
  
  
Dictation Location: [place] [address]  
  
Tech: Jo Watkins  
  
Transcribed By: COLBY 247  
Dictated By: Zach Le DO 24 1636  
  
Signed By:  
24 1637  
[facility]11- Radiology Diagnostic study note
[place] [address]  
  
CT Scan Report  
Signed  
  
Patient: Kathi Self MR#: [id number]  
79  
: 1970 Acct:[id number]  
  
Age/Sex: 54 / F ADM Date:   
4  
Loc: CT Room: Type: [facility]  
Attending Dr: Brittney Meza MD  
Copies to: Brittney Meza MD~  
  
  
  
Ordering Provider: Brittney Meza MD  
Date of Service: 24  
Accession #: ([id number] CT/CT abdomen pelvis w con: R07.9 - Chest pain, 
unspecified  
([id number]) CT/CT chest wo/w con: R07.9 - Chest pain, unspecified  
  
  
  
  
CT chest wo/w con, CT abdomen pelvis w con 2024 4:50 PM  
  
SIGN AND SYMPTOMS: Bilateral lower rib pain, generalized abdominal pain, weight 
loss, hematuria  
  
CONTRAST: 90 mL of intravenous Isovue-300  
  
TECHNIQUE: Multidetector CT axial slices of the chest, abdomen and pelvis were 
obtainedwith and without IV contrast. Multiplanar reformats were performed and 
viewed on a separate workstation and reviewed to further define anatomy and 
possible pathology. CT was performed with one or more of the following dose 
reduction techniques: Automated exposure control, adjustment of the mA and/or kV
 according to patient size, or use of iterative reconstruction technique.  
  
COMPARISON: 2008.  
  
FINDINGS:  
Lower neck: There is an 11 mm hypoattenuating structure in the right thyroid 
lobe which appears to be new when compared to the prior exam. Nonemergent 
ultrasound follow-up is recommended as malignancy is not excluded.  
Vessels: Within normal limits. There is no evidence of pulmonary embolism.  
Mediastinum and Marie: Within normal limits.  
Heart: Normal size. No pericardial effusion.  
Airways: Within normal limits  
Lungs: Emphysematous changes are present in the lung parenchyma. There is 
scarring at the lung apices. There is pleural-based scarring at the left lung 
base.  
Pleura: Within normal limits.  
Chest Wall: Within normal limits.  
  
Abdomen:  
Liver: There is an 8 mm focus of hypoattenuation in the right hepatic lobe.  
Bile Ducts: Normal caliber.  
Gallbladder: No calcified gallstones. Normal caliber wall.  
Pancreas: within normal limits.  
Spleen: within normal limits.  
Adrenals: within normal limits.  
Kidneys: within normal limits.  
  
Pelvis:  
Reproductive Organs: The uterine fundus is heterogeneously enlarged presumably 
relating to uterine fibroids. The largest measures 5.6 cm in greatest transverse
 dimension.  
Ureters: within normal limits.  
Bladder: within normal limits.  
  
Bowel: There are uncomplicated colonic diverticula.  
Mesenteric Lymph Nodes: No enlarged mesenteric lymph nodes.  
Peritoneum: No ascites or free air, no fluid collection.  
Vessels: Atherosclerotic changes are noted in the abdominal aorta and its 
branches..  
Retroperitoneum: within normal limits.  
Abdominal Wall: within normal limits.  
Bones: Bilateral L5 pars defects are noted with grade 1 spondylolisthesis of L5 
upon S1.  
  
  
  
ORDER #: [id number] CT/CT chest wo/w con  
IMPRESSION:  
  
No acute cardiopulmonary pathology or intra-abdominal pathology.  
  
There is an 11 mm hypoattenuating structure in the right thyroid lobe which 
appears to be new when compared to the prior exam. Nonemergent ultrasound 
follow-up is recommended as malignancy is not excluded.  
  
Emphysematous changes are present in the lung parenchyma.  
  
There is an 8 mm suspected cyst or hemangioma in the right hepatic lobe.  
  
The uterine fundus is heterogeneously enlarged presumably relating to uterine 
fibroids. The largestmeasures 5.6 cm in greatest transverse dimension.  
  
Uncomplicated colonic diverticula are noted.  
  
Impression dictated by: Aguila Phillip M.D.2024 7:56 PM  
  
  
Dictation Location: [facility]-17  
  
  
  
Transcribed By: [facility] 24  
Dictated By: Aguila Phillip II, MD 24  
  
Signed By:  
24  
[facility]  
Work Phone: [phone]- History of Present illness Narrative* ARIANNA Ellis - 10/30/2024 10:20 AM EDTFormatting of this note is different from 
  the original.  
  
Reason for Appointment:  
  
Patient ID: Kathi Self is a 54 y.o. female who presents for Blood in Urine (Pt 
present today for urinary issues. Pt states she has blood and leuks in her 
urine. Pt had pain in urethra and very dark urine. Pt states she has lost 15 
pounds since August. Wants to discuss a referral to Urology. )  
  
Patient presents today for Acute Visit. Consistent urinary issues.  
  
MEDICATIONS  
Current Outpatient Medications  
Medication Instructions  
acetaminophen-codeine (Tylenol w/ Codeine #3) 300-30 MG tablet 1 tablet, Oral, 3
 times daily PRN  
ALPRAZolam (Xanax) 0.25 MG tablet TAKE 1 TABLET ORALLY TWICE DAILY AS NEEDED FOR
 ANXIETY FOR 30 DAYS  
cyclobenzaprine (Flexeril) 10 MG tablet  
estradiol-norethindrone (ActivElla) 1-0.5 MG tablet 1 tablet, Oral, Daily  
famotidine (PEPCID) 40 mg, Nightly  
nystatin (Mycostatin) ointment Topical, 2 times daily, Thin amount in 
combination with Triamcinolone equal parts  
OMEPRAZOLE PO  
ondansetron ODT (Zofran-ODT) 4 MG disintegrating tablet Q8H  
sulfamethoxazole-trimethoprim (Bactrim DS) 800-160 MG per tablet 1 tablet  
triamcinolone (Kenalog) 0.1 % ointment Topical, 2 times daily  
  
  
ALLERGIES  
Allergies  
Allergen Reactions  
Acetaminophen Hives  
Other Reaction(s): Hives  
Propoxyphene Hives  
Other Reaction(s): Hives  
  
  
  
PROBLEMS  
  
Active Ambulatory Problems  
Diagnosis Date Noted  
Anxiety 2010  
SI (sacroiliac) pain 2024  
Smoking 2024  
  
Resolved Ambulatory Problems  
Diagnosis Date Noted  
No Resolved Ambulatory Problems  
  
Past Medical History:  
Diagnosis Date  
Cervical lesion  
Relies on partner vasectomy for contraception  
Spontaneous pneumothorax 2009  
  
  
HISTORY  
  
PAST MEDICAL HISTORY SOCIAL HISTORY  
Past Medical History:  
Diagnosis Date  
Cervical lesion  
Relies on partner vasectomy for contraception  
Spontaneous pneumothorax 2009  
Dr. Simons  
Social History  
  
Tobacco Use  
Smoking status: Every Day  
Types: Cigarettes  
Smokeless tobacco: Never  
Tobacco comments:  
> Other tobacco use: Yes, Vape  
Vaping Use  
Vaping status: Never Used  
Substance Use Topics  
Alcohol use: Not Currently  
Comment: Caffeine intake: 1-2 cups per day  
Drug use: Never  
  
FAMILY HISTORY  
Family History  
Problem Relation Name Age of Onset  
Heart disease Maternal Grandmother  
Breast cancer Paternal Grandmother 80  
  
  
SURGICAL HISTORY  
Past Surgical History:  
Procedure Laterality Date  
CHEST TUBE INSERTION  
Lung collapse (Pneumothorax)  
COLONOSCOPY 2020  
Mild sigmoid diverticulosis - Hykes - repeat 10 years  
COLPOSCOPY 2010  
Colpo with biopsy -Cervical lesion  
DILATION AND CURETTAGE OF UTERUS   
THORACOSCOPY W/ TALC PLEURODESIS 2008  
TONSILLECTOMY   
  
REVIEW OF SYSTEMS  
Review of Systems:  
Review of Systems  
Constitutional: Negative.  
HENT: Negative.  
Eyes: Negative.  
Respiratory: Negative.  
Cardiovascular: Negative.  
Gastrointestinal: Negative.  
Genitourinary: Negative.  
Musculoskeletal: Negative.  
Skin: Negative.  
Neurological: Negative.  
All other systems reviewed and are negative.  
Hematological: Negative.  
Endocrine: Negative.  
Allergic/Immunologic: Negative.  
  
  
OBJECTIVE  
  
Objective:  
Physical Exam  
Constitutional:  
Appearance: Normal appearance. She is normal weight.  
HENT:  
Head: Normocephalic.  
Cardiovascular:  
Rate and Rhythm: Normal rate.  
Pulses: Normal pulses.  
Pulmonary:  
Effort: Pulmonary effort is normal.  
Breath sounds: Normal breath sounds.  
Abdominal:  
Palpations: Abdomen is soft.  
Musculoskeletal:  
General: Normal range of motion.  
Neurological:  
General: No focal deficit present.  
Mental Status: She is alert and oriented to person, place, and time.  
Psychiatric:  
Mood and Affect: Mood normal.  
Behavior: Behavior normal.  
Thought Content: Thought content normal.  
Judgment: Judgment normal.  
Vitals and nursing note reviewed.  
  
  
  
Vitals:  
Estimated body mass index is 21.97 kg/m as calculated from the following:  
Height as of 2/15/23: 5' 3.25 .  
Weight as of this encounter: 125 lb.  
BP: 140/80  
No LMP recorded. Patient is postmenopausal.  
  
ASSESSMENT & PLAN  
  
ICD-10-CM  
1. Urine troubles R39.89 POCT urinalysis dipstick manually resulted  
Urine culture  
  
Pt states she had pain in the urethra a few days ago and once she urinated pt 
states she seen a form of a parasite. Pt has lost 15 pounds since August and has
 had an abnormal stool. Pt desires to have her urine sent out for cx. Urine was 
sent out at today's visit.  
Patient had been on a camping trip recently and symptoms started shortly after 
trip. Pt states not the cleanist of camping trips and feels she could have 
picked something up from wood. She describes passing of a parasite or worm like 
substance in her urine. We will send for stool culture as she hasalso had 
diarrhea. We will refer to urology to DR Hernandez per patient request.  
  
Documented by Ratna Roblero MA on behalf of: ARIANNA Ellis  
Electronically signed by ARIANNA Ellis at 10/31/2024 9:37 AM EDT  
  
documented in this encounterBarnes-[facility] [id number]- History of Present illness
 Narrative* Roque Hartman MD - 10/09/2024 2:50 PM EDTFormatting of this 
  note is different from the original.  
Subjective  
Kathi Self is a 54 y.o. female  
  
Chief Complaint  
Annual Exam  
  
  
  
HPI  
  
Patient is here for follow-up continue management for previous evaluation for 
atypical chest pain and hyperlipidemia. Since last time I saw her she admit to 
very high level of anxiety. 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 
future of that hospital remains uncertain. The patient denies complaint of chest
 pain recently denies lightheadedness, dizziness or syncope. She remains active.  
  
ASSESSMENT:  
  
1. Atypical chest pain appears musculoskeletal or pleuritic the patient had a 
remote history of resection of lung bleb. Recent stress test is reassuring. 
Patient report complete resolution of her symptoms with after her period: 
Calcium scoring with her stopped  
2. reformed smoker  
3. hyperlipidemia. Does not meet criteria for treatment recent lab work noted 
and reviewed with her  
4. minor shortness breath related to prior tobacco use functional class I with 
excellent exercise tolerance  
5. Documentation of few premature ventricular contractions, felt to be benign. 
In the past with no recurrence  
  
RECOMMENDATION:  
  
1. Reviewed the results of her previous cardiac workup including stress test and
 calcium scoring  
2. I recommended continue with observant approach discussed with her risk factor
 modification  
3. I discussed with her risk factor modification  
4. I advised her to notify me with changes of cardiac status or symptoms I will 
see her back in 1 year  
Review of Systems  
All other systems reviewed and are negative.  
  
  
Vitals:  
10/09/24 1522 10/09/24 1552  
BP: 146/86 131/82  
BP Location: Left arm  
Patient Position: Sitting  
Pulse: 84  
Weight: 57.9 kg (127 lb 9.6 oz)  
Height: 1.6 m (5' 3 )  
  
  
Objective  
Physical Exam  
Constitutional:  
Appearance: Normal appearance.  
HENT:  
Nose: Nose normal.  
Neck:  
Vascular: No carotid bruit.  
Cardiovascular:  
Rate and Rhythm: Normal rate.  
Pulses: Normal pulses.  
Heart sounds: Normal heart sounds.  
Pulmonary:  
Effort: Pulmonary effort is normal.  
Abdominal:  
General: Bowel sounds are normal.  
Palpations: Abdomen is soft.  
Musculoskeletal:  
General: Normal range of motion.  
Cervical back: Normal range of motion.  
Right lower leg: No edema.  
Left lower leg: No edema.  
Skin:  
General: Skin is warm and dry.  
Neurological:  
General: No focal deficit present.  
Mental Status: She is alert.  
Psychiatric:  
Mood and Affect: Mood normal.  
Behavior: Behavior normal.  
Thought Content: Thought content normal.  
Judgment: Judgment normal.  
  
Allergies  
Patient has no known allergies.  
  
Current Medications  
  
Current Outpatient Medications:  
acetaminophen-codeine (Tylenol w/ Codeine #3) 300-30 mg tablet, Take 1 tablet by
 mouth 3 times a day as needed for severe pain (7 - 10)., Disp: , Rfl:  
ALPRAZolam (Xanax) 0.25 mg tablet, Take 1 tablet (0.25 mg) by mouth if needed 
for anxiety., Disp: ,Rfl:  
cyclobenzaprine (Flexeril) 10 mg tablet, Take 1 tablet (10 mg) by mouth if 
needed., Disp: , Rfl:  
famotidine (Pepcid) 20 mg tablet, Take 1 tablet (20 mg) by mouth once daily at 
bedtime., Disp: , Rfl:  
omeprazole (PriLOSEC) 20 mg DR capsule, Take 1 capsule (20 mg) by mouth once 
daily in the morning. Take before meals. Do not crush or chew., Disp: , Rfl:  
  
  
  
Assessment/Plan  
1. Chest pain, unspecified type  
  
2. Premature ventricular contractions Follow Up In Cardiology  
Follow Up In Cardiology  
  
3. Shortness of breath on exertion  
  
4. Hyperlipidemia, unspecified hyperlipidemia type  
  
5. Former smoker  
  
6. Body mass index (BMI) 22.0-22.9, adult  
  
  
  
Scribe Attestation  
By signing my name below, Maria Teresa TINSLEY LPN  
, Scribbeatrice  
attest that this documentation has been prepared under the direction and in the 
presence of MD Jin.  
  
Provider Attestation - Scribe documentation  
  
All medical record entries made by the Scribe were at my direction and 
personally dictated by me. Farrah reviewed the chart and agree that the record 
accurately reflects my personal performance of the history, physical exam, 
discussion and plan.  
  
Electronically signed by Roque Hartman MD at 10/09/2024 3:55 PM EDT  
  
documented in this [facility]  
Work Phone: [phone]- Instructions* Patient Instructions*   
  
Maria Teresa Gillis LPN - 10/09/2024 2:50 PM EDT  
  
Formatting of this note might be different from the original.  
Please bring all medicines, vitamins, and herbal supplements with you when you 
come to the office.  
  
Prescriptions will not be filled unless you are compliant with your follow up 
appointments or have a follow up appointment scheduled as per instruction of 
your physician. Refills should be requested at the time of your visit.  
  
One year  
Same meds  
Electronically signed by Maria Teresa Gillis LPN at 10/09/2024 3:52 PM EDT  
  
  
  
documented in this [facility]Bl[facility]  
Work Phone: [phone]- Evaluation note*   
  
                      Diagnosis  Onset Date Resolution Status     Admit Date  
   
                      Anxiety                          acute       2:11pm  
   
                      Vaginal irritation                       acute      2024 2:11pm  
   
                      Urinary frequency                       acute      2024 11:36am  
   
                      Chest pain                       acute      2024 11:07am  
   
                      Generalized abdominal pain                       acute      
  2024 11:07am  
   
                      Nausea                           acute      2024 11:07am  
   
                      Pleurisy                         acute      2024 11:07am  
   
                      Weight loss, abnormal                       acute      Oct  
shasta 15th, 2024 11:07am  
  
  
[facility]  
Work Phone: [phone]- History of Present illness Narrative* ARIANNA Ellis - 2024 1:10 PM EDTFormatting of this note is different from 
  the original.  
  
Reason for Appointment:  
  
Patient ID: Kathi ROQUE Clair is a 54 y.o. female who presents for Vaginal Pain  
  
Patient presents today for Acute Visit.  
  
MEDICATIONS  
Current Outpatient Medications  
Medication Instructions  
acetaminophen-codeine (Tylenol w/ Codeine #3) 300-30 MG tablet 1 tablet, Oral, 3
 times daily PRN  
ALPRAZolam (Xanax) 0.25 MG tablet TAKE 1 TABLET ORALLY TWICE DAILY AS NEEDED FOR
 ANXIETY FOR 30 DAYS  
cyclobenzaprine (Flexeril) 10 MG tablet  
estradiol-norethindrone (ActivElla) 1-0.5 MG tablet 1 tablet, Oral, Daily  
nystatin (Mycostatin) ointment Topical, 2 times daily, Thin amount in 
combination with Triamcinolone equal parts  
OMEPRAZOLE PO  
triamcinolone (Kenalog) 0.1 % ointment Topical, 2 times daily  
  
  
ALLERGIES  
No Known Allergies  
  
  
PROBLEMS  
  
Active Ambulatory Problems  
Diagnosis Date Noted  
Anxiety 2010  
SI (sacroiliac) pain 2024  
Smoking 2024  
  
Resolved Ambulatory Problems  
Diagnosis Date Noted  
No Resolved Ambulatory Problems  
  
Past Medical History:  
Diagnosis Date  
Cervical lesion  
Relies on partner vasectomy for contraception  
Spontaneous pneumothorax 2009  
  
  
HISTORY  
  
PAST MEDICAL HISTORY SOCIAL HISTORY  
Past Medical History:  
Diagnosis Date  
Cervical lesion  
Relies on partner vasectomy for contraception  
Spontaneous pneumothorax 2009  
Dr. Simons  
Social History  
  
Tobacco Use  
Smoking status: Every Day  
Types: Cigarettes  
Smokeless tobacco: Never  
Tobacco comments:  
> Other tobacco use: Yes, Vape  
Vaping Use  
Vaping status: Never Used  
Substance Use Topics  
Alcohol use: Not Currently  
Comment: Caffeine intake: 1-2 cups per day  
Drug use: Never  
  
FAMILY HISTORY  
Family History  
Problem Relation Name Age of Onset  
Heart disease Maternal Grandmother  
Breast cancer Paternal Grandmother 80  
  
  
SURGICAL HISTORY  
Past Surgical History:  
Procedure Laterality Date  
CHEST TUBE INSERTION  
Lung collapse (Pneumothorax)  
COLONOSCOPY 2020  
Mild sigmoid diverticulosis - [facility] - repeat 10 years  
COLPOSCOPY   
Colpo with biopsy -Cervical lesion  
DILATION AND CURETTAGE OF UTERUS   
THORACOSCOPY W/ TALC PLEURODESIS 2008  
TONSILLECTOMY 1976  
  
REVIEW OF SYSTEMS  
Review of Systems:  
Review of Systems  
Constitutional: Negative.  
HENT: Negative.  
Eyes: Negative.  
Respiratory: Negative.  
Cardiovascular: Negative.  
Gastrointestinal: Negative.  
Genitourinary: Negative.  
Musculoskeletal: Negative.  
Skin: Negative.  
Neurological: Negative.  
All other systems reviewed and are negative.  
Hematological: Negative.  
Endocrine: Negative.  
Allergic/Immunologic: Negative.  
  
  
OBJECTIVE  
  
Objective:  
Physical Exam  
Constitutional:  
Appearance: Normal appearance. She is normal weight.  
Genitourinary:  
Left Labia: rash.  
  
Vulva exam comments: Small red raised excoriated region.  
HENT:  
Head: Normocephalic.  
Cardiovascular:  
Rate and Rhythm: Normal rate.  
Pulses: Normal pulses.  
Pulmonary:  
Effort: Pulmonary effort is normal.  
Breath sounds: Normal breath sounds.  
Abdominal:  
Palpations: Abdomen is soft.  
Musculoskeletal:  
General: Normal range of motion.  
Neurological:  
General: No focal deficit present.  
Mental Status: She is alert and oriented to person, place, and time.  
Psychiatric:  
Mood and Affect: Mood normal.  
Behavior: Behavior normal.  
Thought Content: Thought content normal.  
Judgment: Judgment normal.  
Vitals and nursing note reviewed.  
  
  
  
Vitals:  
Estimated body mass index is 22.39 kg/m as calculated from the following:  
Height as of 2/15/23: 5' 3.25 .  
Weight as of this encounter: 127 lb 6.4 oz.  
BP:  
No LMP recorded. Patient is postmenopausal.  
  
ASSESSMENT & PLAN  
  
ICD-10-CM  
1. Vaginal pain R10.2  
  
Patient presents for pain and rash to left labial area. Patient states she had 
been on a camping trip recently with her  and developed sore post camping
 trip. Uncertain if came into contact with something in the woods. She is 
monagamous with her  and no concern for sti. She was seen last week and 
started on cream and flagyl which have not seemed to help from another GYN. Exam
 today shows small painful lesion to the left labia, cultures obtained. Patient 
given a new prescription ofclobetasole and premarin cream. Patient will follow 
up for biopsy if symptoms do not improve  
  
Documented by ARIANNA Ellis on behalf of: ARIANNA Ellis  
Electronically signed by ARIANNA Ellis at 2024 2:59 PM EDT  
  
documented in this encounterBarnes-Jewish [facility] [id number]- History of Present illness
 Narrative* Bree Kamara,  - 2024 10:00 AM EDTFormatting of this note
   is different from the original.  
Images from the original note were not included.  
Subjective  
Kathi Self is a 54 y.o. female  
Chief Complaint  
Patient presents with  
Gynecologic Exam  
C/o redness and inflammation in vagina started 5w ago when she had a yeast 
infection- treated w/ OTC Monistat. States it went away, but returned, so she 
did another round of Monistat. Denies vaginal discharge, odor, itching. Sexually
 active, denies pain with intercourse, but admits she has been avoiding it. 
Thinks Bartholin Cyst.  
  
  
History of Present Illness  
  
Current Outpatient Medications:  
cyclobenzaprine (Flexeril) 10 MG tablet, , Disp: , Rfl:  
OMEPRAZOLE PO, , Disp: , Rfl:  
acetaminophen-codeine (Tylenol w/ Codeine #3) 300-30 MG tablet, Take 1 tablet by
 mouth 3 (three) times a day as needed, Disp: , Rfl:  
ALPRAZolam (Xanax) 0.25 MG tablet, TAKE 1 TABLET ORALLY TWICE DAILY AS NEEDED 
FOR ANXIETY FOR 30 DAYS, Disp: , Rfl:  
estradiol-norethindrone (ActivElla) 1-0.5 MG tablet, Take 1 tablet by mouth in 
the morning., Disp: 30 tablet, Rfl: 11  
  
Past Medical History:  
Diagnosis Date  
Cervical lesion  
Relies on partner vasectomy for contraception  
Spontaneous pneumothorax   
Dr. Simons  
  
  
Past Surgical History:  
Procedure Laterality Date  
CHEST TUBE INSERTION  
Lung collapse (Pneumothorax)  
COLONOSCOPY 2020  
Mild sigmoid diverticulosis - Cipriano - repeat 10 years  
COLPOSCOPY   
Colpo with biopsy -Cervical lesion  
DILATION AND CURETTAGE OF UTERUS   
THORACOSCOPY W/ TALC PLEURODESIS 2008  
TONSILLECTOMY   
  
  
Family History  
Problem Relation Name Age of Onset  
Heart disease Maternal Grandmother  
Breast cancer Paternal Grandmother 80  
  
  
OB History  
 Para Term  AB Living  
4 4 4 0 0 4  
SAB IAB Ectopic Multiple Live Births  
0 0 0 0 0  
  
# Outcome Date GA Lbr Josué/2nd Weight Sex Type Anes PTL Lv  
4 Term  
3 Term  
2 Term  
1 Term  
  
Obstetric Comments  
Pap 21- Neg, HPV Neg  
Mammogram 1/ 3/24- Neg  
Colonoscopy 2020- Neg, repeat 10 years (Cipriano)  
  
  
  
Review of Systems  
All negative unless documented in treatment  
  
Objective  
Visit Vitals  
/86  
Wt 128 lb  
BMI 22.50 kg/m  
OB Status Postmenopausal  
Smoking Status Every Day  
BSA 1.61 m  
  
Allergies  
Allergen Reactions  
Acetaminophen Hives  
Propoxyphene Hives  
  
Physical Exam  
Constitutional:  
Appearance: Normal appearance.  
Genitourinary:  
Vulva, bladder, rectum and urethral meatus normal.  
Right Labia: No rash, tenderness, lesions, skin changes or Bartholin's cyst.  
Left Labia: No tenderness, lesions, skin changes, Bartholin's cyst or rash.  
Vulva exam comments: Small tear or fissure in skin on L.  
Vaginal discharge and erythema (Mild erythema right at the introitus) present.  
No vaginal tenderness or bleeding.  
No vaginal prolapse present.  
No vaginal atrophy present.  
  
Right Adnexa: not tender and no mass present.  
Left Adnexa: not tender and no mass present.  
Cervical nabothian cyst (fairly large 1.5-2 cm nabothian cyst encompassing the 
posterior aspect of the cervix.) present.  
No cervical motion tenderness or lesion.  
No parametrium nodularity or thickening present.  
Uterus is not fixed or tender.  
Uterus is anteverted.  
HENT:  
Head: Normocephalic and atraumatic.  
Musculoskeletal:  
Right lower leg: No edema.  
Left lower leg: No edema.  
Neurological:  
Mental Status: She is alert and oriented to person, place, and time.  
Skin:  
General: Skin is warm and dry.  
Findings: No rash.  
  
Procedures  
  
ICD-10-CM  
1. Vulvar itching L29.2  
  
She has been experiencing what she thought was yeast infections due to soreness 
and itching. She used Monistat which resolved symptoms. A week or so later she 
developed symptoms again, used Monistat again on Friday. She thinks she has some
 swelling on the Left and itching. She took Keflex last night and this morning. 
Denies any new medication, soaps, detergents etc. Discharge noted. Wet prep- Bv 
and yeast. Will rx Diflucan 2 tablets and Flagyl. She will take one Diflucan now
 complete Flagyl andthen take her second Diflucan. In the mean time she will use
 Nystatin/Triamcinolone thin amount twice daily externally and slowly wean. She 
will call office if symptoms do not improve.  
  
Entered by Jayla Adame LPN acting as scribe for Dr. Bree Kamara. 
Signature: Jayla Adame LPN  
The documentation recorded by the scribe accurately reflects the service(s) I 
personally performed and the decisions I made.  
Signature: Bree Kamara DO  
  
Assessment & Plan  
  
  
Electronically signed by Bree Kamara DO at 2024 8:01 AM EDT  
  
[facility]*   
  
                      Encounter Date Diagnosis  Assessment Notes Treatment Notes  
 Treatment   
Clinical Notes  
   
                                                Situational anxiety   
(ICD-10 - F41.8)                                              
  
  
[facility]  
Other Phone: [phone]- Evaluation note*   
  
                      Encounter Date Diagnosis  Assessment Notes Treatment Notes  
 Treatment   
Clinical Notes  
   
                                                Trapezius muscle   
spasm (ICD-10 -   
M62.838)                                            REquests refill   
of muscle relaxer  
                                          
   
                                                Acute intractable   
tension-type   
headache (ICD-10 -   
G44.201)                                            Has been to   
chiropractor.   
Suggested   
massotherapy,   
heat and topical   
NSAIDs for neck   
strain. Nurtec   
samples #2 given.  
                                          
  
  
[facility]  
Other Phone: [phone]- Evaluation note*   
  
                      Encounter Date Diagnosis  Assessment Notes Treatment Notes  
 Treatment Clinical   
Notes  
   
                                        15 Dec, 2023        Dysuria (ICD-10 -   
R30.0)                                                        
  
  
[facility]  
Other Phone: [phone]- Evaluation note*   
  
                      Encounter Date Diagnosis  Assessment Notes Treatment Notes  
 Treatment   
Clinical Notes  
   
                                        14 Dec, 2023        Situational anxiety   
(ICD-10 - F41.8)                                              
  
  
[facility]  
Other Phone: [phone]- Evaluation note*   
  
                      Encounter Date Diagnosis  Assessment Notes Treatment Notes  
 Treatment   
Clinical Notes  
   
                                                Situational anxiety   
(ICD-10 - F41.8)                                              
  
  
[facility]  
Other Phone: [phone]- Evaluation note*   
  
                      Encounter Date Diagnosis  Assessment Notes Treatment Notes  
 Treatment   
Clinical Notes  
   
                                        26 Sep, 2023        Tongue abnormality   
(ICD-10 - Q38.3)                                    Discussed burning   
tongue syndrome.   
Does have some   
photos of her   
swollen tongue.   
Wants to take   
prednisone prn.   
Requests referral   
to Dr. Pineda.  
Discussed adding B   
complex vitamin   
and she has read   
info that it could   
be worse w her   
HRT. She will hold   
her HRT as it is   
just for hot   
flashes.  
                                          
   
                                        26 Sep, 2023        Situational anxiety   
(ICD-10 - F41.8)                                    Chronic problem,   
prefers prn med -   
refilled rx and   
reviewed [facility]   
report  
                                          
  
  
[facility]  
Other Phone: [phone]- Evaluation note*   
  
                      Encounter Date Diagnosis  Assessment Notes Treatment Notes  
 Treatment   
Clinical Notes  
   
                                        05 Sep, 2023        Tongue abnormality   
(ICD-10 - Q38.3)                                              
  
  
[facility]  
Other Phone: [phone]- Evaluation note*   
  
                      Encounter Date Diagnosis  Assessment Notes Treatment Notes  
 Treatment   
Clinical Notes  
   
                                        17 Aug, 2023        Tongue abnormality   
(ICD-10 - Q38.3)                                              
  
  
[facility]  
Other Phone: [phone]- Evaluation note*   
  
                      Encounter Date Diagnosis  Assessment Notes Treatment Notes  
 Treatment   
Clinical Notes  
   
                                        16 Aug, 2023        Wellness   
examination   
(ICD-10 - Z00.00)                                   Pt requests order   
for wellness labs.   
Today was not a   
wellness exam  
                                          
   
                                        16 Aug, 2023        Tongue abnormality   
(ICD-10 - Q38.3)                                    Discussed   
differential -   
thrush, burning   
tongue syndrome.   
Recommend followup   
w dentist -has appt   
in 2 weeks. Will   
treat presently and   
monitor symptoms.  
                                          
  
  
[facility]  
Other Phone: [phone]- Evaluation note*   
  
                      Encounter Date Diagnosis  Assessment Notes Treatment Notes  
 Treatment   
Clinical Notes  
   
                                        02 Aug, 2023        Candidal   
stomatitis   
(ICD-10 - B37.0)                                    Use medication as   
directed. Change   
toothbrush.   
Nystatin, 4 ml to   
each swish and   
swallow QID x14   
days. P atient   
verbalizes   
understanding and is   
agreeable to   
treatment   
plan.Follow up with   
PCP if symptoms do   
not improve with   
treatment  
                                          
  
  
[facility]  
Other Phone: [phone] complaint Narrative - Reported* KATHI SELF is 
  being seen for a cardiovascular evaluation.  
* KATHI SELF is being seen for chest pain.  
[facility]  
Work Phone: [phone] complaint Narrative - Reported* KATHI SELF is 
  being seen for a cardiovascular evaluation.  
* KATHI SELF is being seen for chest pain.  
[facility]  
Work Phone: [phone] noteNo assessment information available
[facility]  
Work Phone: [phone] noteNo InformationNort\[facility]\"" [facility]  
Other Phone: [phone] note*   
  
                          Diagnosis    Onset Date   Resolution   Status  
   
                          Anxiety                                acute  
  
  
[facility]  
Work Phone: [phone] note*   
  
                          Diagnosis    Onset Date   Resolution   Status  
   
                          Anxiety                                acute  
   
                          Anxiety                                acute  
  
  
[facility]  
Work Phone: [phone] note*   
  
                          Diagnosis    Onset Date   Resolution   Status  
   
                          Anxiety                                acute  
   
                          Anxiety                                acute  
   
                          SI (sacroiliac) pain                           acute  
  
  
[facility]  
Work Phone: [phone] note*   
  
                          Diagnosis    Onset Date   Resolution   Status  
   
                          Anxiety                                acute  
   
                          Anxiety                                acute  
   
                          Vaginal irritation                           acute  
  
  
[facility]  
Work Phone: [phone] note*   
  
                                                    Diagnosis  
   
                                                      
  
  
Chest pain, unspecified type- Primary  
   
                                                      
  
  
Premature ventricular contractions  
  
  
Other premature beats  
   
                                                      
  
  
Shortness of breath on exertion  
  
  
Shortness of breath  
   
                                                      
  
  
Hyperlipidemia, unspecified hyperlipidemia type  
   
                                                      
  
  
Former smoker  
  
  
Personal history of tobacco use, presenting hazards to health  
   
                                                      
  
  
Body mass index (BMI) 22.0-22.9, adult  
  
documented in this encounter  
[facility]  
Work Phone: [phone] note*   
  
                          Diagnosis    Onset Date   Resolution   Status  
   
                          Anxiety                                acute  
   
                          Anxiety                                acute  
   
                          Vaginal irritation                           acute  
   
                          Urinary frequency                           acute  
  
  
[facility]  
Work Phone: [phone] note*   
  
                          Diagnosis    Onset Date   Resolution   Status  
   
                          Anxiety                                acute  
   
                          Anxiety                                acute  
   
                          Vaginal irritation                           acute  
   
                          Urinary frequency                           acute  
   
                          Chest pain                             acute  
   
                          Nausea                                 acute  
   
                          Pleurisy                               acute  
   
                          Weight loss, abnormal                           acute  
  
  
[facility]  
Work Phone: [phone] note*   
  
                          Diagnosis    Onset Date   Resolution   Status  
   
                          Anxiety                                acute  
   
                          Anxiety                                acute  
   
                          Vaginal irritation                           acute  
   
                          Urinary frequency                           acute  
   
                          Chest pain                             acute  
   
                          Generalized abdominal pain                           a  
cute  
   
                          Nausea                                 acute  
   
                          Pleurisy                               acute  
   
                          Weight loss, abnormal                           acute  
  
  
[facility]  
Work Phone: [phone] note*   
  
                                                    Diagnosis  
   
                                                      
  
  
Urine troubles  
  
  
Other urinary problems  
   
                                                      
  
  
Diarrhea, unspecified type  
  
documented in this encounter  
[facility]   
  
                                                    Diagnosis  
   
                                                      
  
  
Vulvar itching  
   
                                                      
  
  
Vaginal discharge  
  
  
Leukorrhea, not specified as infective  
   
                                                      
  
  
BV (bacterial vaginosis)  
  
  
Unspecified vaginitis and vulvovaginitis  
   
                                                      
  
  
Vaginal yeast infection  
  
  
Candidiasis of vulva and vagina  
  
documented in this encounter  
[facility]   
  
                                                    Diagnosis  
   
                                                      
  
  
Vaginal pain  
  
  
Unspecified symptom associated with female genital organs  
  
documented in this encounter  
[facility]   
  
                                                    Diagnosis  
   
                                                      
  
  
Encounter for gynecological examination with abnormal finding- Primary  
   
                                                      
  
  
Encounter for screening mammogram for malignant neoplasm of breast  
   
                                                      
  
  
Screening for malignant neoplasm of cervix  
  
  
Screening for malignant neoplasm of the cervix  
   
                                                      
  
  
Screening for HPV (human papillomavirus)  
  
  
Special screening examination for human papillomavirus (HPV)  
   
                                                      
  
  
Postmenopausal HRT (hormone replacement therapy)  
  
  
Need for prophylactic hormone replacement therapy (postmenopausal)  
   
                                                      
  
  
Vaginal irritation  
  
  
Pruritus of genital organs  
   
                                                      
  
  
Dyspareunia in female  
   
                                                      
  
  
Vaginal yeast infection  
  
  
Candidiasis of vulva and vagina  
  
documented in this encounter  
[facility]   
  
                                                    Diagnosis  
   
                                                      
  
  
Chest pain, unspecified type- Primary  
   
                                                      
  
  
Shortness of breath on exertion  
  
  
Shortness of breath  
   
                                                      
  
  
Premature ventricular contractions  
  
  
Other premature beats  
   
                                                      
  
  
Hyperlipidemia, unspecified hyperlipidemia type  
   
                                                      
  
  
Former smoker  
  
  
Personal history of tobacco use, presenting hazards to health  
   
                                                      
  
  
BMI 21.0-21.9, adult  
  
documented in this encounter  
[facility]  
Work Phone: [phone] note*   
  
                                                    Diagnosis  
   
                                                      
  
  
Hyperlipidemia, unspecified hyperlipidemia type  
   
                                                      
  
  
Shortness of breath on exertion  
  
  
Shortness of breath  
   
                                                      
  
  
Chest pain, unspecified type  
   
                                                      
  
  
Family history of coronary artery disease  
  
  
Family history of ischemic heart disease  
   
                                                      
  
  
Palpitations  
   
                                                      
  
  
BMI 21.0-21.9, adult  
   
                                                      
  
  
Dizziness  
  
  
Dizziness and giddiness  
   
                                                      
  
  
Former smoker  
  
  
Personal history of tobacco use, presenting hazards to health  
   
                                                      
  
  
New-onset angina (CMS-HCC)  
  
  
Other and unspecified angina pectoris  
  
documented in this encounter  
[facility]  
Work Phone: [phone] general Narrative - Reported*   
  
                                Type            Description     Date  
   
                                Surgical History tonsillectomy as a child   
   
                                Surgical History Lung Collapsed left side   
   
                                Hospitalization History childbirth x4     
   
                                Hospitalization History Kidney infection as a ch  
ild   
  
  
[facility] [facility]  
Other Phone: [phone] general Narrative - Reported*   
  
                                Type            Description     Date  
   
                                Medical History Situational anxiety   
   
                                Medical History Tongue abnormality   
   
                                Surgical History tonsillectomy as a child   
   
                                Surgical History Lung Collapsed left side   
   
                                Hospitalization History childbirth x4     
   
                                Hospitalization History Kidney infection as a ch  
ild   
  
  
[facility] [facility]  
Other Phone: [phone] of Present illness Narrative* Patient is here 
  for cardiovascular evaluation for symptoms of chest pain I documented that 
  were similar presentation. Previous medical work-up included a stress test and
   echocardiogram which was negative. Her symptoms felt to be either GI in 
  nature or musculoskeletal. The patient had a previous history of resection of 
  the lung Bleb  
* By Dr. Simons . She report recent increase in frequency of her chest pain.
   She will few days ago she had an episode lasted almost the whole day. That 
  when she put her hand on her chest and pressed against the area. She 
  intermittent shortness of breath. She is described functional class I. Thereis
   no clear associated, precipitating or alleviating factors.  
* ASSESSMENT:  
* 1. Atypical chest pain appears musculoskeletal or pleuritic  
* 2. reformed smoker  
* 3. hyperlipidemia. Does not meet criteria for treatment  
* 4. minor shortness breath related to prior tobacco use  
* 5. Documentation of few premature ventricular contractions, felt to be benign.
   In the past with no recurrence  
* RECOMMENDATION:  
* 1. The patient was advised to proceed with GXT.  
* 2. I advised her to have a chest x-ray and lipid profile  
* 3. I recommended a trial of metoprolol  
* 4. I advised her to notify me with changes of cardiac status or symptoms I 
  will see her back in 2-3  
* Follow-up  
[facility]  
Work Phone: [phone] of Present illness Narrative* Patient is here 
  for cardiovascular evaluation for symptoms of chest pain I documented that 
  were similar presentation. Previous medical work-up included a stress test and
   echocardiogram which was negative. Her symptoms felt to be either GI in 
  nature or musculoskeletal. The patient had a previous history of resection of 
  the lung Bleb  
* By Dr. Simons . She report recent increase in frequency of her chest pain.
   She will few days ago she had an episode lasted almost the whole day. That 
  when she put her hand on her chest and pressed against the area. She 
  intermittent shortness of breath. She is described functional class I. Thereis
   no clear associated, precipitating or alleviating factors.  
* ASSESSMENT:  
* 1. Atypical chest pain appears musculoskeletal or pleuritic  
* 2. reformed smoker  
* 3. hyperlipidemia. Does not meet criteria for treatment  
* 4. minor shortness breath related to prior tobacco use  
* 5. Documentation of few premature ventricular contractions, felt to be benign.
   In the past with no recurrence  
* RECOMMENDATION:  
* 1. The patient was advised to proceed with GXT.  
* 2. I advised her to have a chest x-ray and lipid profile  
* 3. I recommended a trial of metoprolol  
* 4. I advised her to notify me with changes of cardiac status or symptoms I 
  will see her back in 2-3  
* Follow-up  
[facility]  
Work Phone: [phone] of Present illness Narrative* Patient is here 
  for follow-up continue management for previous evaluation for atypical chest 
  pain. Has been following the patient since  for intermittent atypical 
  chest pain. She underwent several cardiac evaluation with echocardiogram and 
  stress test all of it appears to be reassuring. Her chest pain and 
  nonexertional. She does describe some radiation to the left arm. There is no 
  clear associated, precipitated or alleviated symptoms. She did undergo 
  resection of lung bleb back in  by Dr. Patel. She also had been on 
  Prilosec for some dyspepsia. She was referred for a stress test where she was 
  able to exercise for 10 minutes. Did not have any EKG changes. And was 
  clinically negative test.  
* ASSESSMENT:  
* 1. Atypical chest pain appears musculoskeletal or pleuritic the patient had a 
  remote history of resection of lung bleb. Recent stress test is reassuring. 
  Patient continues to complain of intermittentepisodes of chest pain. Following
   last office visit I did recommend trial of beta-blockers but the patient did 
  not want to do that  
* 2. reformed smoker  
* 3. hyperlipidemia. Does not meet criteria for treatment recent lab work noted 
  and reviewed with her  
* 4. minor shortness breath related to prior tobacco use functional class I with
   excellent exercise tolerance  
* 5. Documentation of few premature ventricular contractions, felt to be benign.
   In the past with no recurrence  
* RECOMMENDATION:  
* 1. Reviewed the results of her GXT and lab work and reassured her cardiac  
* 2. I recommended calcium scoring for risk assessment considering her continued
   complaint and concern about underlying ischemic heart disease  
* 3. I discussed with her second opinion and or invasive evaluation but the 
  patient declined  
* 4. I advised her to notify me with changes of cardiac status or symptoms I 
  will see her back in 9 months  
[facility] [address] DO  
Work Phone: [phone] Discharge instructions  
Additional Instructions  
Please return to emergency department for any new or worrisome symptoms 
including any numbness,  
weakness, tingling, difficulty urinating or difficulty walking. Please be 
advised that muscle  
relaxers can make you drowsy and you should stay at home until you know how they
 affect you for  
driving for at least 8 hours. Follow-up with your family physician within the 
next 3 to 5 days.[facility]  
Work Phone: [phone] for referral (narrative)* Consultation 
  (Routine) - Authorized  
  
                          Specialty    Diagnoses / Procedures Referred By Contac  
t Referred To Contact  
   
                                        Cardiology            
  
  
Diagnoses  
  
  
Premature ventricular   
contractions  
  
  
  
Procedures  
  
  
Follow Up In Cardiology                   
  
  
Roque Hartman MD  
  
  
[address] [place] [address], [address] [address]  
  
  
Phone: [phone]  
  
  
Fax: [phone]                         
  
  
Roque Hartman MD  
  
  
[address] [place] [address] 2, [address] [address]  
  
  
Phone: [phone]  
  
  
Fax: [phone]  
  
  
  
                    Referral ID Status    Reason    Start Date Expiration Date V  
isits   
Requested                               Visits   
Authorized  
   
                [id number] Authorized         10/9/2024 10/9/2025 1       1  
  
  
  
  
Electronically signed by Roque Hartman MD at 10/09/2024 3:53 PM EDT  
  
  
[facility]  
Work Phone: [phone]  
  
Summary Purpose  
  
  
                                                      
  
  
  
Family History  
No Family History Records FoundUnknown Family Member  
  
                                Name            Dates           Details  
   
                                                    Family history of chest pain  
: Mother(V19.8, Z84.89)  
                                                    Status:Active  
  
                              Unknown Family Member  
  
                                Name            Dates           Details  
   
                                                    Family history of chest pain  
: Mother(V19.8, Z84.89)  
                                                    Status:Active  
  
  
  
                          Relationship Condition    Age at Onset Recorded Date/T  
helen  
   
                          Not Specified No pertinent family history Unknown       
   
  
                              Unknown Family Member  
  
                                Name            Dates           Details  
   
                                                    Family history of chest pain  
: Mother(V19.8, Z84.89)  
                                                    Status:Active  
  
                              Unknown Family Member  
  
                                Name            Dates           Details  
   
                                                    Family history of chest pain  
: Mother(V19.8, Z84.89)  
                                                    Status:Active  
  
                              Unknown Family Member  
  
                                Name            Dates           Details  
   
                                                    Family history of chest pain  
: Mother(V19.8, Z84.89)  
                                                    Status:Active  
  
                              Unknown Family Member  
  
                                Name            Dates           Details  
   
                                                    Family history of chest pain  
: Mother(V19.8, Z84.89)  
                                                    Status:Active  
  
  
  
                          Relationship Condition    Age at Onset Recorded Date/T  
helen  
   
                          Not Specified No pertinent family history Unknown       
   
   
                          father            Unknown        
   
                          Not Specified Hypertension Unknown        
  
  
  
                          Relationship Condition    Age at Onset Recorded Date/T  
helen  
   
                          Not Specified No pertinent family history Unknown       
   
   
                          father            Unknown        
   
                          mother       Hypertension Unknown        
  
  
  
Advance Directives  
No Advanced Directives Records Found  
  
                                Advance Directive Response        Recorded Date/  
Time  
   
                                Advance Directives No               11:14am  
  
  
  
                                Advance Directive Response        Recorded Date/  
Time  
   
                                Advance Directives No               12:14pm  
  
  
  
Chief Complaint and Reason for Visit  
  
  
                                        Chief Complaint     R06.02  
  
  
  
                                        Chief Complaint     Amb Documentation  
Check Up  
  
  
  
                                        Chief Complaint     Check Up  
med follow up  
   
                                        Reason for Visit    Anxiety  
  
  
  
                                        Chief Complaint     Check Up  
med follow up  
Back Pain/leg numbness  
   
                                        Reason for Visit    Anxiety  
Anxiety  
  
  
  
                                        Chief Complaint     Check Up  
med follow up  
Back Pain/leg numbness  
lower back pain  
   
                                        Reason for Visit    Anxiety  
Anxiety  
SI (sacroiliac) pain  
  
  
  
                                        Chief Complaint     Check Up  
med follow up  
Back Pain/leg numbness  
lower back pain  
m53.3  
   
                                        Reason for Visit    Anxiety  
Anxiety  
SI (sacroiliac) pain  
  
  
  
                                        Chief Complaint     Check Up  
med follow up  
Back Pain/leg numbness  
lower back pain  
m53.3  
3 month follow up  
   
                                        Reason for Visit    Anxiety  
Anxiety  
  
  
  
                                        Chief Complaint     Back Pain/leg numbne  
ss  
lower back pain  
m53.3  
3 month follow up  
vaginal pain/ antibiotic not working  
   
                                        Reason for Visit    Anxiety  
Anxiety  
Vaginal irritation  
  
  
  
                                        Chief Complaint     Back Pain/leg numbne  
ss  
lower back pain  
m53.3  
3 month follow up  
vaginal pain/ antibiotic not working  
UA, cloudy, darker, and frequency  
   
                                        Reason for Visit    Anxiety  
Anxiety  
Vaginal irritation  
  
  
  
                                        Chief Complaint     Back Pain/leg numbne  
ss  
lower back pain  
m53.3  
3 month follow up  
vaginal pain/ antibiotic not working  
UA, cloudy, darker, and frequency  
Increased frequency of urination  
   
                                        Reason for Visit    Anxiety  
Anxiety  
Vaginal irritation  
Urinary frequency  
  
  
  
                                        Chief Complaint     Back Pain/leg numbne  
ss  
lower back pain  
m53.3  
3 month follow up  
vaginal pain/ antibiotic not working  
UA, cloudy, darker, and frequency  
Increased frequency of urination  
Rib Pain/Nausea  
   
                                        Reason for Visit    Anxiety  
Anxiety  
Vaginal irritation  
Urinary frequency  
Chest pain  
Nausea  
Pleurisy  
Weight loss, abnormal  
  
  
  
                                        Chief Complaint     Back Pain/leg numbne  
ss  
lower back pain  
m53.3  
3 month follow up  
vaginal pain/ antibiotic not working  
UA, cloudy, darker, and frequency  
R35.0  
Rib Pain/Nausea  
   
                                        Reason for Visit    Anxiety  
Anxiety  
Vaginal irritation  
Urinary frequency  
Chest pain  
Generalized abdominal pain  
Nausea  
Pleurisy  
Weight loss, abnormal  
  
  
  
                                        Chief Complaint     3 month follow up  
vaginal pain/ antibiotic not working  
UA, cloudy, darker, and frequency  
R35.0  
Rib Pain/Nausea  
r11.0  
r19.7  
   
                                        Reason for Visit    Anxiety  
Anxiety  
Vaginal irritation  
Urinary frequency  
Chest pain  
Generalized abdominal pain  
Nausea  
Pleurisy  
Weight loss, abnormal  
  
  
  
                                        Chief Complaint     Admit Date  
   
                                        vaginal pain/ antibiotic not working Sep  
2024 2:11pm  
   
                                        UA, cloudy, darker, and frequency Octobe  
r 2024 11:36am  
   
                                        R35.0               2024 1  
1:45am  
   
                                        Rib Pain/Nausea     2024 1  
1:07am  
   
                                        r11.0               2024 1  
2:16pm  
   
                                        r19.7               2024 2  
:05pm  
   
                                        R07.9 R63.4 R11.0 R09.1 R10.84 2024 3:32pm  
  
  
  
                                        Reason for Visit    Admit Date  
   
                                        Anxiety             2024  
 2:11pm  
   
                                        Vaginal irritation  2024  
 2:11pm  
   
                                        Urinary frequency   2024 1  
1:36am  
   
                                        Chest pain          2024 1  
1:07am  
   
                                        Generalized abdominal pain 2024 11:07am  
   
                                        Nausea              2024 1  
1:07am  
   
                                        Pleurisy            2024 1  
1:07am  
   
                                        Weight loss, abnormal 2024  
 11:07am  
  
  
  
                                        Chief Complaint     Admit Date  
   
                                        vaginal pain/ antibiotic not working Sep  
ber 2024 2:11pm  
   
                                        UA, cloudy, darker, and frequency Octobe  
r 2024 11:36am  
   
                                        R35.0               2024 1  
1:45am  
   
                                        Rib Pain/Nausea     2024 1  
1:07am  
   
                                        r11.0               2024 1  
2:16pm  
   
                                        r19.7               2024 2  
:05pm  
   
                                        R07.9 R63.4 R11.0 R09.1 R10.84 2024 3:32pm  
   
                                        E04.1               2024   
2:37pm  
  
  
  
                                        Chief Complaint     Admit Date  
   
                                        CC Adult Risk Stratification  3:11pm  
   
                                        4 month f/u         2024 1  
0:54am  
   
                                        Possible Ulcer      2025   
8:26am  
  
  
  
                                        Reason for Visit    Admit Date  
   
                                        Anxiety             2024 1  
0:54am  
  
  
  
                                        Chief Complaint     Admit Date  
   
                                        Possible Ulcer      2025   
8:26am  
   
                                        3 month f/u         2025 1:0  
1pm  
  
  
  
                                        Reason for Visit    Admit Date  
   
                                        Chronic nausea      2025   
8:26am  
   
                                        GERD (gastroesophageal reflux disease) F  
ebruary 2025 8:26am  
  
  
  
Chief Complaint  
KATHI SELF is being seen for a 2 month follow-up of.  
  
Reason for Referral  
  
  
                                        Reason              *FU 10/03   Liv  
her Edwin - ENT in   
[place] - burning tongue syndrome with   
intermittent swelling  
   
                                        Diagnosis 1         Tongue abnormality (  
Q38.3)  
   
                                        Referral Organization [facility]  
paul  
   
                                        Referring Provider First Name Brittney  
   
                                        Referring Provider Last Name Kwabena  
   
                                        Referring Provider Specialty [facility]  
   
                                        Referred Organization Unknown Facility  
   
                                        Referred Provider   Tl Pineda  
   
                                        Referred Provider Specialty Ear, Nose an  
d Throat  
   
                                        Referral Priority   Routine  
   
                                        General Notes       Jennifer Cartwright  11:28:02 AM >received   
today, attachments made, notes locked, referral faxed  
   
                                        Clinical Notes      p: [phone]  
f: [id number]  
  
  
  
Additional Source Comments  
  
  
  
                                                    INFORMATION SOURCE (unrecogn  
ized section and content)  
   
                                          
  
  
  
                                        DATE CREATED        AUTHOR  
   
                                2018                      [facility]  
  
  
  
                                DATE CREATED    AUTHOR          AUTHOR'S ORGANIZ  
ATION  
   
                                2020                      [facility]  
  
  
  
                                DATE CREATED    AUTHOR          AUTHOR'S ORGANIZ  
ATION  
   
                                10/12/2022                      [facility]  
pital  
  
  
  
                                DATE CREATED    AUTHOR          AUTHOR'S ORGANIZ  
ATION  
   
                                2022                      [facility]  
s  
  
  
  
                                DATE CREATED    AUTHOR          AUTHOR'S ORGANIZ  
ATION  
   
                                2022                      [facility]  
dical Specialist  
  
  
  
                                DATE CREATED    AUTHOR          AUTHOR'S ORGANIZ  
ATION  
   
                                2023                      [facility]  
ical Center  
  
  
  
                                DATE CREATED    AUTHOR          AUTHOR'S ORGANIZ  
ATION  
   
                                2023                       [facility]  
  
  
  
                                DATE CREATED    AUTHOR          AUTHOR'S ORGANIZ  
ATION  
   
                                2023                      [facility]  
[facility]  
  
  
  
                                DATE CREATED    AUTHOR          AUTHOR'S ORGANIZ  
ATION  
   
                                2024                      [facility]  
[facility]ician Group  
  
  
  
                                DATE CREATED    AUTHOR          AUTHOR'S ORGANIZ  
ATION  
   
                                2025                      [facility]  
dical Specialists [facility]  
  
  
  
                                DATE CREATED    AUTHOR          AUTHOR'S ORGANIZ  
ATION  
   
                                2025                      [facility]  
  
  
  
                                DATE CREATED    AUTHOR          AUTHOR'S ORGANIZ  
ATION  
   
                                2025                      [facility]  
  
  
  
                                DATE CREATED    AUTHOR          AUTHOR'S ORGANIZ  
ATION  
   
                                2025                      [facility]  
  
  
  
  
  
                                                    Care Teams (unrecognized sec  
tion and content)  
   
                                          
  
  
  
                                                    Team Status: Active   
   
                          Member       Role         Status       Dates  
   
                          Brittney Meza MD Primary Care Provider Active        
   
  
  
  
                                                    Team Status: Inactive   
   
                          Member       Role         Status       Dates  
   
                                        Brittney Meza MD Primary Care Provide  
r,   
Attending Provider        Active                    Start: 2025  
End: 2025  
  
  
  
                                                    Team Status: Inactive   
   
                          Member       Role         Status       Dates  
   
                                        Brittney Meza MD Primary Care Provide  
r, Attending   
Provider                  Active                    Start: 2025  
End: 2025  
  
  
  
                                                    Team Status: Active   
   
                          Member       Role         Status       Dates  
   
                                        Brittney Meza MD Primary Care Provide  
r, Attending   
Provider                  Active                    Start: 2024  
  
  
  
  
                                                    Team Status: Inactive   
   
                          Member       Role         Status       Dates  
   
                                        Brittney Meza MD Primary Care Provide  
r,   
Attending Provider        Active                    Start: 2024  
End: 2024  
  
  
  
                                                    Team Status: Inactive   
   
                          Member       Role         Status       Dates  
   
                                        Brittney Meza MD Primary Care Provide  
r, Attending   
Provider                  Active                    Start: May 9th, 2024  
End: May 9th, 2024  
  
  
  
                                                    Team Status: Inactive   
   
                          Member       Role         Status       Dates  
   
                                        Brittney Meza MD Primary Care Provide  
r, Attending   
Provider                  Active                    Start: 2024  
End: 2024  
  
  
  
                                                    Team Status: Inactive   
   
                          Member       Role         Status       Dates  
   
                          Brittney Meza MD Primary Care Provider Active        
   
   
                          Roque Hartman MD Attending Provider Active       
    
  
  
  
                                                    Team Status: Active   
   
                          Member       Role         Status       Dates  
   
                          Brittney Meza MD Primary Care Provider Active        
 Start: 2024  
  
   
                          ISAAC Huber Attending Provider Active       Start  
: 2024  
  
  
  
  
                                                    Team Status: Inactive   
   
                          Member       Role         Status       Dates  
   
                          Brittney Meza MD Primary Care Provider Active        
 Start: 2024  
End: 2024  
   
                          Karlie Kuo MD Emergency Provider Active         
Start: 2024  
End: 2024  
  
  
  
                                                    Team Status: Inactive   
   
                          Member       Role         Status       Dates  
   
                                        Brittney Meza MD Primary Care Provide  
r, Attending   
Provider                  Active                    Start: 2024  
End: 2024  
  
  
  
                                                    Team Status: Inactive   
   
                          Member       Role         Status       Dates  
   
                                        Brittney Meza MD Primary Care Provide  
r, Attending   
Provider                  Active                    Start: 2024  
End: 2024  
  
  
  
                                                    Team Status: Inactive   
   
                          Member       Role         Status       Dates  
   
                                        Brittney Meza MD Primary Care Provide  
r, Attending   
Provider                  Active                    Start: 2024  
End: 2024  
  
  
  
                                                    Team Status: Inactive   
   
                          Member       Role         Status       Dates  
   
                                        Brtitney Meza MD Primary Care Provide  
r,   
Attending Provider        Active                    Start: 2024  
End: 2024  
  
  
  
                      Team Member Relationship Specialty  Start Date End Date  
   
                                                      
  
  
Brittney Meza MD  
  
  
NPI: [id number]  
  
  
[address] [place] [address]  
  
  
[phone] (Work)  
  
  
[phone] (Fax) PCP - General                   21            
  
  
  
                                                    Team Status: Inactive   
   
                          Member       Role         Status       Dates  
   
                                        Brittney Meza MD Primary Care Provide  
r,   
Attending Provider        Active                    Start: 2024  
End: 2024  
  
  
  
                                                    Team Status: Inactive   
   
                          Member       Role         Status       Dates  
   
                                        Brittney Meza MD Primary Care Provide  
r,   
Attending Provider        Active                    Start: 2024  
End: 2024  
  
  
  
                      Team Member Relationship Specialty  Start Date End Date  
   
                                                      
  
  
Brittney Meza MD  
  
  
NPI: [id number]  
  
  
[address] [place]   
[id number] [phone] (Work)  
  
  
[phone] (Fax) PCP - General   Family Medicine 23          
   
                                                      
  
  
Bree Kamara DO  
  
  
NPI: [id number]  
  
  
[address] [facility]   
[address]  
  
  
[place] [address]  
  
  
[phone] (Work)  
  
  
[phone] (Fax)        Referring Physician       Obstetrics and   
Gynecology                23                    
  
  
  
                      Team Member Relationship Specialty  Start Date End Date  
   
                                                      
  
  
Brittney Meza MD  
  
  
NPI: [id number]  
  
  
[address] [place]   
[id number] [phone] (Work)  
  
  
[phone] (Fax) PCP - General   Family Medicine 23          
   
                                                      
  
  
Bree Kamara DO  
  
  
NPI: [id number]  
  
  
[address] [facility]   
[address]  
  
  
[place] [address]  
  
  
[phone] (Work)  
  
  
[phone] (Fax)        Referring Physician       Obstetrics and   
Gynecology                23                    
  
  
  
                                                    Team Status: Inactive   
   
                          Member       Role         Status       Dates  
   
                          Brittney Meza MD Primary Care Provider Active        
 Start: 2024  
End: 2024  
   
                          Kathi Carrasco PA-C Attending Provider Active       Sta  
rt: 2024  
End: 2024  
  
  
  
                                                    Team Status: Inactive   
   
                          Member       Role         Status       Dates  
   
                                        Brittney Meza MD Primary Care Provide  
r,   
Attending Provider        Active                    Start: 2024  
End: 2024  
  
  
  
                                                    Team Status: Inactive   
   
                          Member       Role         Status       Dates  
   
                                        Brittney Meza MD Primary Care Provide  
r,   
Attending Provider        Active                    Start: 2024  
End: 2024  
  
  
  
                      Team Member Relationship Specialty  Start Date End Date  
   
                                                      
  
  
Brittney Meza MD  
  
  
NPI: [id number]  
  
  
[address] [place]   
[id number] [phone] (Work)  
  
  
[phone] (Fax) PCP - General   Family Medicine 23          
   
                                                      
  
  
Bree Kamara DO  
  
  
NPI: [id number]  
  
  
[address] [address]  
  
  
[phone] (Work)  
  
  
[phone] (Fax)        Referring Physician       Obstetrics and   
Gynecology                23                    
  
  
  
                      Team Member Relationship Specialty  Start Date End Date  
   
                                                      
  
  
Brittney Meza MD  
  
  
NPI: [id number]  
  
  
[address]   
[address] [phone] (Work)  
  
  
[phone] (Fax) PCP - General   Family Medicine 23          
   
                                                      
  
  
Bree Kamara DO  
  
  
NPI: [id number]  
  
  
[address] [facility]   
[address]  
  
  
[place] [address]  
  
  
[phone] (Work)  
  
  
[phone] (Fax)        Referring Physician       Obstetrics and   
Gynecology                23                    
  
  
  
                      Team Member Relationship Specialty  Start Date End Date  
   
                                                      
  
  
Brittney Meza MD  
  
  
NPI: [id number]  
  
  
[address] [place]   
[id number] [phone] (Work)  
  
  
[phone] (Fax) PCP - General   Family Medicine 23          
   
                                                      
  
  
Bree Kamara DO  
  
  
NPI: [id number]  
  
  
[address] [facility]   
[address]  
  
  
[place] [address]  
  
  
[phone] (Work)  
  
  
[phone] (Fax)        Referring Physician       Obstetrics and   
Gynecology                23                    
  
  
  
                      Team Member Relationship Specialty  Start Date End Date  
   
                                                      
  
  
Brittney Meza MD  
  
  
NPI: [id number]  
  
  
[address] [place]   
[address]  
  
  
[phone] (Work)  
  
  
[phone] (Fax) PCP - General   Family Medicine 23          
   
                                                      
  
  
Bree Kamara DO  
  
  
NPI: [id number]  
  
  
[address] [facility]   
[address]  
  
  
[place] [address]  
  
  
[phone] (Work)  
  
  
[phone] (Fax)        Referring Physician       Obstetrics and   
Gynecology                23                    
  
  
  
                      Team Member Relationship Specialty  Start Date End Date  
   
                                                      
  
  
Brittney Meza MD  
  
  
NPI: [id number]  
  
  
[address] [place]   
[address] [phone] (Work)  
  
  
[phone] (Fax) PCP - General   Family Medicine 23          
   
                                                      
  
  
Bree Kamara DO  
  
  
NPI: [id number]  
  
  
[address] [facility]   
[address]  
  
  
[place] [address]  
  
  
[phone] (Work)  
  
  
[phone] (Fax)        Referring Physician       Obstetrics and   
Gynecology                23                    
  
  
  
                      Team Member Relationship Specialty  Start Date End Date  
   
                                                      
  
  
Brittney Meza MD  
  
  
NPI: [id number]  
  
  
[address] [place]   
[address] [phone] (Work)  
  
  
[phone] (Fax) PCP - General   Family Medicine 23          
   
                                                      
  
  
Bree Kamara DO  
  
  
NPI: [id number]  
  
  
[address] [facility]   
[address]  
  
  
[place] [address]  
  
  
[phone] (Work)  
  
  
[phone] (Fax)        Referring Physician       Obstetrics and   
Gynecology                23                    
  
  
  
                      Team Member Relationship Specialty  Start Date End Date  
   
                                                      
  
  
Brittney Meza MD  
  
  
NPI: [id number]  
  
  
[address] [place] [address]  
  
  
[phone] (Work)  
  
  
[phone] (Fax) PCP - General                   21            
  
  
  
                      Team Member Relationship Specialty  Start Date End Date  
   
                                                      
  
  
Brittney Meza MD  
  
  
NPI: [id number]  
  
  
[address] [place] [address]  
  
  
[phone] (Work)  
  
  
[phone] (Fax) PCP - General                   21            
   
                                                      
  
  
Lorenzo Kirby MD  
  
  
NPI: [id number]  
  
  
[address] [facility]  
  
  
[place] [address]  
  
  
[phone] (Work)  
  
  
[phone] (Fax) Cardiologist    Interventional Cardiology 3/19/25           
  
  
  
  
  
                                                    Goals (unrecognized section   
and content)  
   
                                                    Goals may be documented in a  
n alternate sectionNo InformationNo InformationNo   
InformationNo InformationNo InformationNo InformationNo InformationNo 
InformationNo   
InformationNo InformationNo InformationGoals may be documented in an alternate   
sectionGoals may be documented in an alternate sectionGoals may be documented in
 an   
alternate sectionGoals may be documented in an alternate sectionGoals may be   
documented in an alternate sectionGoals may be documented in an alternate   
sectionGoals may be documented in an alternate sectionGoals may be documented in
 an   
alternate sectionGoals may be documented in an alternate sectionGoals may be   
documented in an alternate sectionGoals may be documented in an alternate   
sectionGoals may be documented in an alternate sectionGoals may be documented in
 an   
alternate sectionGoals may be documented in an alternate sectionGoals may be   
documented in an alternate sectionGoals may be documented in an alternate   
sectionGoals may be documented in an alternate section  
  
  
  
  
                                                    REASON FOR VISIT (unrecogniz  
ed section and content)  
   
                                          
  
  
  
                                        Reason              Comments  
   
                                        Annual Exam           
  
  
  
                          Specialty    Diagnoses / Procedures Referred By Contac  
t Referred To Contact  
   
                                        Cardiology            
  
  
Diagnoses  
  
  
Premature ventricular   
contractions  
  
  
  
Procedures  
  
  
Follow Up In Cardiology                   
  
  
Roque Hartman MD  
  
  
[address] [facility] [address]  
  
  
[place] [address]  
  
  
Phone: [phone]  
  
  
Fax: [phone]                         
  
  
  
  
  
                Referral ID Status  Reason  Start Date Expiration Date Visits Re  
quested Visits   
Authorized  
   
                [id number]  Closed          10/3/2023 3/31/2024 1       1  
  
  
  
                                        Reason              Comments  
   
                                        Blood in Urine      Pt present today for  
 urinary issues. Pt states she has blood and   
leuks   
in her urine. Pt had pain in urethra and very dark urine. Pt states   
she has lost 15 pounds since August. Wants to discuss a referral to   
Urology.  
  
  
  
                                        Reason              Comments  
   
                                        Gynecologic Exam    C/o redness and infl  
ammation in vagina started 5w ago when she   
had a   
yeast infection- treated w/ OTC Monistat. States it went away, but   
returned, so she did another round of Monistat. Denies vaginal   
discharge, odor, itching. Sexually active, denies pain with   
intercourse, but admits she has been avoiding it. Thinks Bartholin   
Cyst.  
  
  
  
                                        Reason              Comments  
   
                                        Vaginal Pain          
  
  
  
                                        Reason              Comments  
   
                                        Gynecologic Exam    Pt presents for pap/  
yearly. Denies breast,bowel,bladder. Having  
   
dryness with intercourse. CT scan showed fibroids in her uterus she   
would like to discuss.  
  
  
  
                                        Reason              Comments  
   
                                        Follow-up           Patient request - CP  
, left shoulder, neck pain  
  
  
  
                                        Reason              Comments  
   
                                        Follow-up           Second Opinion  
  
  
FOR RECORDS PERTAINING TO PATIENTS WHO ARE OR HAVE BEEN ENROLLED IN A CHEMICAL 
DEPENDENCY/SUBSTANCEABUSE PROGRAM, SOME INFORMATION MAY BE OMITTED. This 
clinical summary was aggregated from multiple sources. Caution should be 
exercised in using it in the provision of clinical care. This summary normalizes
 information from multiple sources, and as a consequence, information in this 
document may materially change the coding, format and clinical context of 
patient data. In addition, data may be omitted in some cases. CLINICAL DECISIONS
 SHOULD BE BASED ON THE PRIMARY CLINICAL RECORDS. [employer] provides
 no warranty or guarantee of the accuracy or completeness of information in this
 document.

## 2025-04-01 LAB
ALBUMIN SERPL-MCNC: 4.3 G/DL (ref 3.6–5.1)
ALP SERPL-CCNC: 41 U/L (ref 37–153)
ALT SERPL-CCNC: 23 U/L (ref 6–29)
ANION GAP SERPL CALCULATED.4IONS-SCNC: 7 MMOL/L (CALC) (ref 7–17)
AST SERPL-CCNC: 21 U/L (ref 10–35)
BILIRUB SERPL-MCNC: 0.4 MG/DL (ref 0.2–1.2)
BUN SERPL-MCNC: 13 MG/DL (ref 7–25)
CALCIUM SERPL-MCNC: 9 MG/DL (ref 8.6–10.4)
CHLORIDE SERPL-SCNC: 103 MMOL/L (ref 98–110)
CHOLEST SERPL-MCNC: 218 MG/DL
CHOLEST/HDLC SERPL: 3 (CALC)
CO2 SERPL-SCNC: 27 MMOL/L (ref 20–32)
CREAT SERPL-MCNC: 0.96 MG/DL (ref 0.5–1.03)
EGFRCR SERPLBLD CKD-EPI 2021: 70 ML/MIN/1.73M2
ERYTHROCYTE [DISTWIDTH] IN BLOOD BY AUTOMATED COUNT: 13.3 % (ref 11–15)
GLUCOSE SERPL-MCNC: 89 MG/DL (ref 65–99)
HCT VFR BLD AUTO: 42.3 % (ref 35–45)
HDLC SERPL-MCNC: 72 MG/DL
HGB BLD-MCNC: 13.8 G/DL (ref 11.7–15.5)
LDLC SERPL CALC-MCNC: 126 MG/DL (CALC)
MCH RBC QN AUTO: 30 PG (ref 27–33)
MCHC RBC AUTO-ENTMCNC: 32.6 G/DL (ref 32–36)
MCV RBC AUTO: 92 FL (ref 80–100)
NONHDLC SERPL-MCNC: 146 MG/DL (CALC)
PLATELET # BLD AUTO: 223 THOUSAND/UL (ref 140–400)
PMV BLD REES-ECKER: 11 FL (ref 7.5–12.5)
POTASSIUM SERPL-SCNC: 4.6 MMOL/L (ref 3.5–5.3)
PROT SERPL-MCNC: 6.4 G/DL (ref 6.1–8.1)
RBC # BLD AUTO: 4.6 MILLION/UL (ref 3.8–5.1)
SODIUM SERPL-SCNC: 137 MMOL/L (ref 135–146)
TRIGL SERPL-MCNC: 94 MG/DL
WBC # BLD AUTO: 4.9 THOUSAND/UL (ref 3.8–10.8)

## 2025-04-03 ENCOUNTER — APPOINTMENT (OUTPATIENT)
Dept: CARDIOLOGY | Facility: CLINIC | Age: 55
End: 2025-04-03
Payer: COMMERCIAL

## 2025-04-04 ENCOUNTER — HOSPITAL ENCOUNTER (OUTPATIENT)
Dept: RADIOLOGY | Facility: CLINIC | Age: 55
Discharge: HOME | End: 2025-04-04
Payer: COMMERCIAL

## 2025-04-04 VITALS
RESPIRATION RATE: 17 BRPM | HEART RATE: 59 BPM | OXYGEN SATURATION: 100 % | SYSTOLIC BLOOD PRESSURE: 102 MMHG | DIASTOLIC BLOOD PRESSURE: 57 MMHG

## 2025-04-04 DIAGNOSIS — I20.9 NEW-ONSET ANGINA (CMS-HCC): ICD-10-CM

## 2025-04-04 PROCEDURE — 2500000001 HC RX 250 WO HCPCS SELF ADMINISTERED DRUGS (ALT 637 FOR MEDICARE OP): Performed by: INTERNAL MEDICINE

## 2025-04-04 PROCEDURE — 2550000001 HC RX 255 CONTRASTS: Performed by: INTERNAL MEDICINE

## 2025-04-04 PROCEDURE — 75574 CT ANGIO HRT W/3D IMAGE: CPT

## 2025-04-04 RX ORDER — NITROGLYCERIN 400 UG/1
2 SPRAY ORAL ONCE
Status: COMPLETED | OUTPATIENT
Start: 2025-04-04 | End: 2025-04-04

## 2025-04-04 RX ADMIN — NITROGLYCERIN 2 SPRAY: 400 SPRAY ORAL at 08:09

## 2025-04-04 RX ADMIN — IOHEXOL 85 ML: 350 INJECTION, SOLUTION INTRAVENOUS at 08:24

## 2025-04-07 ENCOUNTER — TELEPHONE (OUTPATIENT)
Dept: CARDIOLOGY | Facility: CLINIC | Age: 55
End: 2025-04-07
Payer: COMMERCIAL

## 2025-04-07 NOTE — TELEPHONE ENCOUNTER
----- Message from Nurse Sue CAR sent at 4/7/2025  8:23 AM EDT -----    ----- Message -----  From: Rodrigo Ku MD  Sent: 4/7/2025   7:53 AM EDT  To: Sue Taveras LPN    CT coronary angiography demonstrates mild diffuse coronary artery disease, no significant coronary artery disease  ----- Message -----  From: Interface, Radiology Results In  Sent: 4/5/2025  12:37 PM EDT  To: Rodrigo Ku MD

## 2025-04-09 ENCOUNTER — HOSPITAL ENCOUNTER
Dept: HOSPITAL 101 - PM | Age: 55
Discharge: HOME | End: 2025-04-09
Payer: COMMERCIAL

## 2025-04-09 DIAGNOSIS — M48.062: Primary | ICD-10-CM

## 2025-04-09 DIAGNOSIS — M79.18: ICD-10-CM

## 2025-04-09 DIAGNOSIS — M47.816: ICD-10-CM

## 2025-04-09 PROCEDURE — G0463 HOSPITAL OUTPT CLINIC VISIT: HCPCS

## 2025-04-11 ENCOUNTER — HOSPITAL ENCOUNTER (OUTPATIENT)
Dept: RADIOLOGY | Facility: CLINIC | Age: 55
End: 2025-04-11
Payer: COMMERCIAL

## 2025-04-25 ENCOUNTER — APPOINTMENT (OUTPATIENT)
Dept: CARDIOLOGY | Facility: CLINIC | Age: 55
End: 2025-04-25
Payer: COMMERCIAL

## 2025-04-25 VITALS
HEIGHT: 64 IN | DIASTOLIC BLOOD PRESSURE: 78 MMHG | HEART RATE: 70 BPM | SYSTOLIC BLOOD PRESSURE: 122 MMHG | BODY MASS INDEX: 20.23 KG/M2 | WEIGHT: 118.5 LBS

## 2025-04-25 DIAGNOSIS — I25.10 CORONARY ARTERY DISEASE INVOLVING NATIVE HEART, UNSPECIFIED VESSEL OR LESION TYPE, UNSPECIFIED WHETHER ANGINA PRESENT: ICD-10-CM

## 2025-04-25 DIAGNOSIS — R07.89 CHEST DISCOMFORT: ICD-10-CM

## 2025-04-25 DIAGNOSIS — E78.2 MIXED HYPERLIPIDEMIA: Primary | ICD-10-CM

## 2025-04-25 DIAGNOSIS — Z87.891 FORMER SMOKER: ICD-10-CM

## 2025-04-25 DIAGNOSIS — Z82.49 FAMILY HISTORY OF CORONARY ARTERY DISEASE: ICD-10-CM

## 2025-04-25 PROCEDURE — 99214 OFFICE O/P EST MOD 30 MIN: CPT | Performed by: INTERNAL MEDICINE

## 2025-04-25 PROCEDURE — 1036F TOBACCO NON-USER: CPT | Performed by: INTERNAL MEDICINE

## 2025-04-25 PROCEDURE — 3008F BODY MASS INDEX DOCD: CPT | Performed by: INTERNAL MEDICINE

## 2025-04-25 RX ORDER — METOPROLOL SUCCINATE 25 MG/1
25 TABLET, EXTENDED RELEASE ORAL DAILY
Qty: 90 TABLET | Refills: 3 | Status: SHIPPED | OUTPATIENT
Start: 2025-04-25 | End: 2026-04-25

## 2025-04-25 RX ORDER — NAPROXEN SODIUM 220 MG/1
81 TABLET, FILM COATED ORAL DAILY
Start: 2025-04-25 | End: 2026-04-25

## 2025-04-25 RX ORDER — ATORVASTATIN CALCIUM 40 MG/1
40 TABLET, FILM COATED ORAL DAILY
Qty: 30 TABLET | Refills: 11 | Status: SHIPPED | OUTPATIENT
Start: 2025-04-25 | End: 2026-04-25

## 2025-04-25 NOTE — PATIENT INSTRUCTIONS
Continue same medications and treatments.   Patient educated on proper medication use.   Patient educated on risk factor modification.   Please bring any lab results from other providers / physicians to your next appointment.     Please bring all medicines, vitamins, and herbal supplements with you when you come to the office.     Prescriptions will not be filled unless you are compliant with your follow up appointments or have a follow up appointment scheduled as per instruction of your physician. Refills should be requested at the time of your visit.    FOLLOW UP IN 1 MONTH AND IN 1 YEAR    OBTAIN FASTING LAB WORK IN 2 MONTHS AND IN 1 YEAR    START ASPIRIN 81 MG DAILY  START ATORVASTATIN 40 DAILY AT BEDTIME  START METOPROLOL SUCCINATE 25 MG DAILY    Teresa CASTILLO LPN, am scribing for and in the presence of Dr. Rodrigo Ku MD, [facility]        Teresa CASTILLO LPN, philip scribing for and in the presence of Dr. Rodrigo Ku MD, [facility]

## 2025-04-25 NOTE — PROGRESS NOTES
CARDIOLOGY OFFICE VISIT      CHIEF COMPLAINT  Chief Complaint   Patient presents with    Follow-up     1 month follow up CAD, HLD and go over CT results.        HISTORY OF PRESENT ILLNESS  The patient is being seen after her recent consultation.  Please see that note for complete history details from 3/20/2025.  She states that the day she took the metoprolol tartrate 50 mg prior to her CT coronary angiography is the best she has felt in a long time.  She was not having any chest discomfort or blood pressure was low.  She was not having any palpitations.  I did discuss results of her CT scan with her.  This demonstrates very minimal to mild coronary artery disease.  Her CT calcium score was 63.  Her lipid profile demonstrates cholesterol 218, HDL 72, triglycerides 94, LDL at 126.  I told her that our goal for LDL cholesterol is 70 or below and some data suggest 50 is probably going to be in the future move.  Unguinal start her on atorvastatin 40 mg a day.  She will let me know if any problems this.  I am also going to start aspirin 81 mg a day.  I told her she needs to try to get some regular exercise and watch her diet a little bit closer.  Because she felt so much better on the 1 dose of metoprolol tartrate 50 I told her to try metoprolol succinate 25 mg a day.  I will see her back in the office in 1 1 to see how she is doing on her medications.  I will obtain lipid profile and 2 months and 1 year from now.  I will see her in the office in 1 year from now so if she continues to do well.    Impression:  CAD, minimal to mild by CT coronary angiogram April 2025  Hyperlipidemia  Family history of coronary artery disease ,mother has had PCI with stenting of her LAD  Former smoker  Labile hypertension       Please excuse any errors in grammar or translation related to this dictation.  Voice recognition software was utilized to prepare this document.    Past Medical History  Medical History[1]    Social History  Social  History[2]    Family History   Family History[3]     Allergies:  RX Allergies[4]     Outpatient Medications:  Current Outpatient Medications   Medication Instructions    ALPRAZolam (XANAX) 0.25 mg, As needed    famotidine (PEPCID) 20 mg, Nightly    metoprolol tartrate (Lopressor) 50 mg tablet Take 1 tablet by mouth 1 hour prior to CT coronary angiogram    Mimvey 1-0.5 mg tablet 1 tablet, Daily    omeprazole (PRILOSEC) 20 mg, Daily before breakfast          REVIEW OF SYSTEMS  Review of Systems   All other systems reviewed and are negative.        VITALS  Vitals:    04/25/25 1152   BP: 122/78   Pulse: 70       PHYSICAL EXAM  Constitutional:       Appearance: Healthy appearance. Not in distress.   Neck:      Vascular: No JVR. JVD normal.   Pulmonary:      Effort: Pulmonary effort is normal.      Breath sounds: Normal breath sounds. No wheezing. No rhonchi. No rales.   Chest:      Chest wall: Not tender to palpatation.   Cardiovascular:      PMI at left midclavicular line. Normal rate. Regular rhythm. Normal S1. Normal S2.       Murmurs: There is no murmur.      No gallop.  No click. No rub.   Pulses:     Intact distal pulses.   Edema:     Peripheral edema absent.   Abdominal:      General: Bowel sounds are normal.      Palpations: Abdomen is soft.      Tenderness: There is no abdominal tenderness.   Musculoskeletal: Normal range of motion.         General: No tenderness. Skin:     General: Skin is warm and dry.   Neurological:      General: No focal deficit present.      Mental Status: Alert and oriented to person, place and time.           ASSESSMENT AND PLAN  Diagnoses and all orders for this visit:  Coronary artery disease involving native heart, unspecified vessel or lesion type, unspecified whether angina present  Family history of coronary artery disease  Chest discomfort  Former smoker  BMI 20.0-20.9, adult      [unfilled]      I,Teresa Jesus LPN am scribing for, and in the presence of   Rodrigo Ku MD, [facility].    I, Dr. Rodrigo Ku MD, [facility], personally performed the services described in the documentation as scribed by Teresa Jesus LPN in my presence, and confirm it is both accurate and complete.      Dr. Rodrigo Simon MD  Thank you for allowing me to participate in the care of this patient. Please do not hesitate to contact me with any further questions or concerns.         [1] History reviewed. No pertinent past medical history.  [2]   Social History  Tobacco Use    Smoking status: Former     Current packs/day: 0.00     Types: Cigarettes     Quit date:      Years since quittin.3    Smokeless tobacco: Never   Substance Use Topics    Alcohol use: Yes     Alcohol/week: 3.0 standard drinks of alcohol     Types: 3 Standard drinks or equivalent per week     Comment: socially 3x times week    Drug use: Never   [3]   Family History  Problem Relation Name Age of Onset    Hypertension Mother      Other (chest pain) Mother      Lung disease Father     [4] No Known Allergies

## 2025-04-28 ENCOUNTER — HOSPITAL ENCOUNTER (OUTPATIENT)
Dept: HOSPITAL 101 - SURGOUT | Age: 55
Discharge: HOME | End: 2025-04-28
Payer: COMMERCIAL

## 2025-04-28 VITALS
HEART RATE: 82 BPM | DIASTOLIC BLOOD PRESSURE: 91 MMHG | OXYGEN SATURATION: 95 % | TEMPERATURE: 97.52 F | SYSTOLIC BLOOD PRESSURE: 129 MMHG

## 2025-04-28 VITALS — HEART RATE: 70 BPM | OXYGEN SATURATION: 100 % | SYSTOLIC BLOOD PRESSURE: 133 MMHG | DIASTOLIC BLOOD PRESSURE: 81 MMHG

## 2025-04-28 DIAGNOSIS — M48.062: Primary | ICD-10-CM

## 2025-04-28 DIAGNOSIS — M54.50: ICD-10-CM

## 2025-04-28 PROCEDURE — 64483 NJX AA&/STRD TFRM EPI L/S 1: CPT

## 2025-04-28 RX ADMIN — IOHEXOL 24 MG: 240 INJECTION, SOLUTION INTRATHECAL; INTRAVASCULAR; INTRAVENOUS; ORAL at 08:27

## 2025-04-28 RX ADMIN — BUPIVACAINE HYDROCHLORIDE 1 ML: 2.5 INJECTION, SOLUTION EPIDURAL; INFILTRATION; INTRACAUDAL; PERINEURAL at 08:27

## 2025-04-28 RX ADMIN — METHYLPREDNISOLONE ACETATE 80 MG: 80 INJECTION, SUSPENSION INTRA-ARTICULAR; INTRALESIONAL; INTRAMUSCULAR; SOFT TISSUE at 08:27

## 2025-04-28 RX ADMIN — LIDOCAINE HYDROCHLORIDE 4 ML: 20 INJECTION, SOLUTION INFILTRATION; PERINEURAL at 08:27

## 2025-04-28 RX ADMIN — SODIUM CHLORIDE 1 ML: 9 INJECTION, SOLUTION INTRAMUSCULAR; INTRAVENOUS; SUBCUTANEOUS at 08:26

## 2025-05-07 ENCOUNTER — HOSPITAL ENCOUNTER
Dept: HOSPITAL 101 - PM | Age: 55
Discharge: HOME | End: 2025-05-07
Payer: COMMERCIAL

## 2025-05-07 DIAGNOSIS — M48.062: Primary | ICD-10-CM

## 2025-05-07 DIAGNOSIS — M47.816: ICD-10-CM

## 2025-05-07 DIAGNOSIS — M79.10: ICD-10-CM

## 2025-05-07 PROCEDURE — G0463 HOSPITAL OUTPT CLINIC VISIT: HCPCS

## 2025-05-19 ENCOUNTER — APPOINTMENT (OUTPATIENT)
Dept: CARDIOLOGY | Facility: CLINIC | Age: 55
End: 2025-05-19
Payer: COMMERCIAL

## 2025-06-04 ENCOUNTER — APPOINTMENT (OUTPATIENT)
Dept: CARDIOLOGY | Facility: CLINIC | Age: 55
End: 2025-06-04
Payer: COMMERCIAL

## 2025-06-04 VITALS
HEIGHT: 64 IN | SYSTOLIC BLOOD PRESSURE: 110 MMHG | HEART RATE: 78 BPM | BODY MASS INDEX: 20.26 KG/M2 | WEIGHT: 118.7 LBS | DIASTOLIC BLOOD PRESSURE: 74 MMHG

## 2025-06-04 DIAGNOSIS — Z82.49 FAMILY HISTORY OF CORONARY ARTERY DISEASE: ICD-10-CM

## 2025-06-04 DIAGNOSIS — E78.2 MIXED HYPERLIPIDEMIA: ICD-10-CM

## 2025-06-04 DIAGNOSIS — I25.10 CORONARY ARTERY DISEASE INVOLVING NATIVE HEART, UNSPECIFIED VESSEL OR LESION TYPE, UNSPECIFIED WHETHER ANGINA PRESENT: ICD-10-CM

## 2025-06-04 DIAGNOSIS — Z87.891 FORMER SMOKER: ICD-10-CM

## 2025-06-04 DIAGNOSIS — R07.89 CHEST DISCOMFORT: ICD-10-CM

## 2025-06-04 DIAGNOSIS — R09.89 LABILE HYPERTENSION: ICD-10-CM

## 2025-06-04 PROCEDURE — 99214 OFFICE O/P EST MOD 30 MIN: CPT | Performed by: INTERNAL MEDICINE

## 2025-06-04 PROCEDURE — 3008F BODY MASS INDEX DOCD: CPT | Performed by: INTERNAL MEDICINE

## 2025-06-04 PROCEDURE — 3078F DIAST BP <80 MM HG: CPT | Performed by: INTERNAL MEDICINE

## 2025-06-04 PROCEDURE — 1036F TOBACCO NON-USER: CPT | Performed by: INTERNAL MEDICINE

## 2025-06-04 PROCEDURE — 3074F SYST BP LT 130 MM HG: CPT | Performed by: INTERNAL MEDICINE

## 2025-06-04 RX ORDER — ROSUVASTATIN CALCIUM 5 MG/1
5 TABLET, COATED ORAL DAILY
Qty: 90 TABLET | Refills: 3 | Status: SHIPPED | OUTPATIENT
Start: 2025-06-04 | End: 2026-06-04

## 2025-06-04 NOTE — PROGRESS NOTES
CARDIOLOGY OFFICE VISIT      CHIEF COMPLAINT:  Chief Complaint   Patient presents with    Follow-up     Pt. Present today for 2 month follow up pt. Stop taking lipitor          HISTORY OF PRESENT ILLNESS  The patient states she has been doing well.  She continues to have some atypical chest pain but she is not bothered by it anymore now that she had her recent CT coronary angiogram results demonstrating minimal to mild CAD.  She denies problems with dyspnea.  1 time she did feel her heart racing and took 1 dose of metoprolol.  This helped.  She was unable to take her atorvastatin due to diarrhea.  I am going to try her on a very small dose of generic Crestor 5 mg a day.  She will let me know if any problems with this.  If no problems we will get lipid profile in 2 months.    Impression:  CAD, minimal to mild by CT coronary angiogram April 2025  Hyperlipidemia- intolerant to Lipitor   Family history of coronary artery disease ,mother has had PCI with stenting of her LAD  Former smoker  Labile hypertension        Please excuse any errors in grammar or translation related to this dictation.  Voice recognition software was utilized to prepare this document.    Past Medical History  Medical History[1]    Social History  Social History[2]    Family History   Family History[3]     Allergies:  RX Allergies[4]     Outpatient Medications:  Current Outpatient Medications   Medication Instructions    ALPRAZolam (XANAX) 0.25 mg, As needed    aspirin 81 mg, oral, Daily    atorvastatin (LIPITOR) 40 mg, oral, Daily    famotidine (PEPCID) 20 mg, Nightly    metoprolol succinate XL (TOPROL-XL) 25 mg, oral, Daily, Do not crush or chew.    Mimvey 1-0.5 mg tablet 1 tablet, Daily    omeprazole (PRILOSEC) 20 mg, Daily before breakfast          REVIEW OF SYSTEMS  Review of Systems   All other systems reviewed and are negative.        VITALS  Vitals:    06/04/25 1344   BP: 110/74   Pulse: 78       PHYSICAL EXAM  Vitals reviewed.    Constitutional:       Appearance: Normal and healthy appearance. Well-developed and not in distress.   Eyes:      Conjunctiva/sclera: Conjunctivae normal.      Pupils: Pupils are equal, round, and reactive to light.   Neck:      Vascular: No JVR. JVD normal.   Pulmonary:      Effort: Pulmonary effort is normal.      Breath sounds: Normal breath sounds. No wheezing. No rhonchi. No rales.   Chest:      Chest wall: Not tender to palpatation.   Cardiovascular:      PMI at left midclavicular line. Normal rate. Regular rhythm. Normal S1. Normal S2.       Murmurs: There is no murmur.      No gallop.  No click. No rub.   Pulses:     Intact distal pulses.   Edema:     Peripheral edema absent.   Abdominal:      Tenderness: There is no abdominal tenderness.   Musculoskeletal: Normal range of motion.         General: No tenderness.      Cervical back: Normal range of motion. Skin:     General: Skin is warm and dry.   Neurological:      General: No focal deficit present.      Mental Status: Alert and oriented to person, place and time.   Psychiatric:         Behavior: Behavior is cooperative.           ASSESSMENT AND PLAN  Diagnoses and all orders for this visit:  Coronary artery disease involving native heart, unspecified vessel or lesion type, unspecified whether angina present  -     Follow Up In Cardiology  Family history of coronary artery disease  -     Follow Up In Cardiology  Chest discomfort  -     Follow Up In Cardiology  Former smoker  -     Follow Up In Cardiology  BMI 20.0-20.9, adult  -     Follow Up In Cardiology  Mixed hyperlipidemia  -     Follow Up In Cardiology  Labile hypertension        Aquilino CASTILLO RN   am scribing for, and in the presence of Dr. Rodrigo Reynoso MD  .    IDr. Rodrigo MD  , personally performed the services described in the documentation as scribed by Aquilino Pires RN   in my presence, and confirm it is both accurate and complete.         [1] History reviewed. No  pertinent past medical history.  [2]   Social History  Tobacco Use    Smoking status: Former     Current packs/day: 0.00     Average packs/day: 1 pack/day for 15.0 years (15.0 ttl pk-yrs)     Types: Cigarettes     Quit date:      Years since quittin.4    Smokeless tobacco: Never   Substance Use Topics    Alcohol use: Yes     Alcohol/week: 5.0 standard drinks of alcohol     Types: 5 Standard drinks or equivalent per week     Comment: socially 3x times week    Drug use: Never   [3]   Family History  Problem Relation Name Age of Onset    Hypertension Mother Mariela Jaramillo     Other (chest pain) Mother Mariela Jaramillo     Heart disease Mother Mariela Jaramillo     Lung disease Father      Heart disease Maternal Grandmother Zhanna Riddhi    [4] No Known Allergies

## 2025-06-04 NOTE — PATIENT INSTRUCTIONS
Patient to follow up in April 2026 as scheduled with Dr. Rodrigo Reynoso MD      Please STOP Atorvastatin (Lipitor)   Please START Rosuvastatin (Crestor) 5mg once daily     Call with concerns with this statin.   Continue plan to repeat lab work for cholesterol in 2 months on the new statin- orders in system.   Will call with results.     Continue same medications and treatments with above changes noted.   Patient educated on proper medication use.   Patient educated on risk factor modification.   Please bring any lab results from other providers / physicians to your next appointment.     Please bring all medicines, vitamins, and herbal supplements with you when you come to the office.     Prescriptions will not be filled unless you are compliant with your follow up appointments or have a follow up appointment scheduled as per instruction of your physician. Refills should be requested at the time of your visit.    IAquilino RN am scribing for and in the presence of Dr. Rodrigo Ku MD

## 2025-06-25 DIAGNOSIS — E78.2 MIXED HYPERLIPIDEMIA: ICD-10-CM

## 2025-08-06 LAB
NON-UH HIE CHOL/HDL RATIO: 2.8
NON-UH HIE CHOLESTEROL: 172 MG/DL (ref 140–200)
NON-UH HIE HDL CHOLESTEROL: 62 MG/DL (ref 23–92)
NON-UH HIE LDL CHOLESTEROL,CALCULATED: 86 MG/DL (ref 0–100)
NON-UH HIE TRIGLYCERIDE W/REFLEX: 120 MG/DL (ref 0–149)
NON-UH HIE VLDL CHOLESTEROL: 24 MG/DL

## 2025-08-07 ENCOUNTER — RESULTS FOLLOW-UP (OUTPATIENT)
Dept: CARDIOLOGY | Facility: CLINIC | Age: 55
End: 2025-08-07
Payer: COMMERCIAL

## 2025-08-07 NOTE — TELEPHONE ENCOUNTER
Patient called back. She was given the results of her lab work. She stated she is tolerating the crestor and she was advised to continue on that as prescribed. Patient verbalized understanding.

## 2025-08-07 NOTE — TELEPHONE ENCOUNTER
----- Message from Nurse Sue CAR sent at 8/7/2025  8:13 AM EDT -----    ----- Message -----  From: Rodrigo Ku MD  Sent: 8/6/2025   4:27 PM EDT  To: Sue HEARD Given, LPN    LDL cholesterol little high at 86, is she tolerating the small dose of Crestor.  If so we will just continue this at this time since she had problems with atorvastatin.  ----- Message -----  From: Karson Mann - Lab Results In  Sent: 8/6/2025   2:40 PM EDT  To: Rodrigo Ku MD

## 2025-08-07 NOTE — TELEPHONE ENCOUNTER
I called patient to make the patient aware of results. I received patient voicemail. A message was left requesting a call back to the office.

## 2025-10-21 ENCOUNTER — APPOINTMENT (OUTPATIENT)
Dept: CARDIOLOGY | Facility: CLINIC | Age: 55
End: 2025-10-21
Payer: COMMERCIAL

## 2026-04-24 ENCOUNTER — APPOINTMENT (OUTPATIENT)
Dept: CARDIOLOGY | Facility: CLINIC | Age: 56
End: 2026-04-24
Payer: COMMERCIAL